# Patient Record
Sex: FEMALE | Race: WHITE | NOT HISPANIC OR LATINO | Employment: FULL TIME | RURAL
[De-identification: names, ages, dates, MRNs, and addresses within clinical notes are randomized per-mention and may not be internally consistent; named-entity substitution may affect disease eponyms.]

---

## 2021-12-29 DIAGNOSIS — Z34.90 PREGNANCY WITH FETUS OF UNKNOWN GESTATIONAL AGE: Primary | ICD-10-CM

## 2022-01-19 ENCOUNTER — INITIAL PRENATAL (OUTPATIENT)
Dept: OBSTETRICS AND GYNECOLOGY | Facility: CLINIC | Age: 22
End: 2022-01-19
Payer: COMMERCIAL

## 2022-01-19 VITALS
DIASTOLIC BLOOD PRESSURE: 60 MMHG | SYSTOLIC BLOOD PRESSURE: 118 MMHG | BODY MASS INDEX: 21.17 KG/M2 | WEIGHT: 139.25 LBS | HEART RATE: 88 BPM

## 2022-01-19 DIAGNOSIS — Z3A.01 6 WEEKS GESTATION OF PREGNANCY: ICD-10-CM

## 2022-01-19 DIAGNOSIS — Z34.91 FIRST TRIMESTER PREGNANCY: ICD-10-CM

## 2022-01-19 DIAGNOSIS — Z34.01 ENCOUNTER FOR SUPERVISION OF NORMAL FIRST PREGNANCY IN FIRST TRIMESTER: Primary | ICD-10-CM

## 2022-01-19 DIAGNOSIS — Z12.4 SCREENING FOR MALIGNANT NEOPLASM OF THE CERVIX: ICD-10-CM

## 2022-01-19 DIAGNOSIS — Z36.89 ENCOUNTER TO ESTABLISH GESTATIONAL AGE USING ULTRASOUND: ICD-10-CM

## 2022-01-19 LAB
BILIRUB SERPL-MCNC: NORMAL MG/DL
BLOOD URINE, POC: NORMAL
COLOR, POC UA: NORMAL
GLUCOSE UR QL STRIP: NORMAL
KETONES UR QL STRIP: NORMAL
LEUKOCYTE ESTERASE URINE, POC: NORMAL
NITRITE, POC UA: NORMAL
PH, POC UA: 7.5
PROTEIN, POC: NORMAL
SPECIFIC GRAVITY, POC UA: 1.02
UROBILINOGEN, POC UA: 0.2

## 2022-01-19 PROCEDURE — G0481 PR DRUG TEST DEF 8-14 CLASSES: ICD-10-PCS | Mod: ,,, | Performed by: CLINICAL MEDICAL LABORATORY

## 2022-01-19 PROCEDURE — 99213 OFFICE O/P EST LOW 20 MIN: CPT | Mod: PBBFAC | Performed by: STUDENT IN AN ORGANIZED HEALTH CARE EDUCATION/TRAINING PROGRAM

## 2022-01-19 PROCEDURE — 87077 CULTURE AEROBIC IDENTIFY: CPT | Mod: ,,, | Performed by: CLINICAL MEDICAL LABORATORY

## 2022-01-19 PROCEDURE — 81003 URINALYSIS AUTO W/O SCOPE: CPT | Mod: PBBFAC | Performed by: STUDENT IN AN ORGANIZED HEALTH CARE EDUCATION/TRAINING PROGRAM

## 2022-01-19 PROCEDURE — 87086 URINE CULTURE/COLONY COUNT: CPT | Mod: ,,, | Performed by: CLINICAL MEDICAL LABORATORY

## 2022-01-19 PROCEDURE — 87086 CULTURE, URINE: ICD-10-PCS | Mod: ,,, | Performed by: CLINICAL MEDICAL LABORATORY

## 2022-01-19 PROCEDURE — 87591 CHLAMYDIA/GONORRHOEAE(GC), PCR: ICD-10-PCS | Mod: ,,, | Performed by: CLINICAL MEDICAL LABORATORY

## 2022-01-19 PROCEDURE — G0481 DRUG TEST DEF 8-14 CLASSES: HCPCS | Mod: ,,, | Performed by: CLINICAL MEDICAL LABORATORY

## 2022-01-19 PROCEDURE — 88142 CYTOPATH C/V THIN LAYER: CPT | Mod: GCY | Performed by: STUDENT IN AN ORGANIZED HEALTH CARE EDUCATION/TRAINING PROGRAM

## 2022-01-19 PROCEDURE — 99203 PR OFFICE/OUTPT VISIT, NEW, LEVL III, 30-44 MIN: ICD-10-PCS | Mod: S$PBB,,, | Performed by: STUDENT IN AN ORGANIZED HEALTH CARE EDUCATION/TRAINING PROGRAM

## 2022-01-19 PROCEDURE — 87077 CULTURE, URINE: ICD-10-PCS | Mod: ,,, | Performed by: CLINICAL MEDICAL LABORATORY

## 2022-01-19 PROCEDURE — 99203 OFFICE O/P NEW LOW 30 MIN: CPT | Mod: S$PBB,,, | Performed by: STUDENT IN AN ORGANIZED HEALTH CARE EDUCATION/TRAINING PROGRAM

## 2022-01-19 PROCEDURE — 87491 CHLAMYDIA/GONORRHOEAE(GC), PCR: ICD-10-PCS | Mod: ,,, | Performed by: CLINICAL MEDICAL LABORATORY

## 2022-01-19 PROCEDURE — 87591 N.GONORRHOEAE DNA AMP PROB: CPT | Mod: ,,, | Performed by: CLINICAL MEDICAL LABORATORY

## 2022-01-19 PROCEDURE — 87186 CULTURE, URINE: ICD-10-PCS | Mod: ,,, | Performed by: CLINICAL MEDICAL LABORATORY

## 2022-01-19 PROCEDURE — 87491 CHLMYD TRACH DNA AMP PROBE: CPT | Mod: ,,, | Performed by: CLINICAL MEDICAL LABORATORY

## 2022-01-19 PROCEDURE — 87186 SC STD MICRODIL/AGAR DIL: CPT | Mod: ,,, | Performed by: CLINICAL MEDICAL LABORATORY

## 2022-01-19 RX ORDER — TERCONAZOLE 8 MG/G
1 CREAM VAGINAL NIGHTLY
Qty: 20 G | Refills: 0 | Status: SHIPPED | OUTPATIENT
Start: 2022-01-19 | End: 2022-01-22

## 2022-01-19 NOTE — PROGRESS NOTES
New OB History and Physical    CC:   Chief Complaint   Patient presents with    Initial Prenatal Visit     Initi OB Visit, LMP 12/3/2021. Pt has been having some nausea & not able to eat.         Assessment/Plan:   Stephanie BULLOCK is a 21 y.o. at 6w5d who presents for new OB visit    Problem List Items Addressed This Visit        Obstetric    6 weeks gestation of pregnancy    Overview     Estimated Date of Delivery: 9/9/22 established by LMP, 6w6d US consistent    First Trimester  - Prenatal Battery: ordered  - UDS: ordered  - Pap Smear: collected    - GC/C: ordered  - Early 1Hr GCT: (Body mass index is 21.17 kg/m².)  - Prophylactic ASA: NA      Second Trimester  - Quad: Plan to offer at 15-19wks.  - Anatomy Ultrasound: Will schedule for 20wks    Third Trimester  - 28 wk labs:   - Rh Status: pending  - 1Hr GCT:   - Fetal Kick Count:  - GC/C:   - GBS:     Vaccines  - Influenza: discussed  - TDAP: Plan to give around 32wks  - COVID: discussed    Infant sleep (ABC's) and Crib (32 weeks):     Contraception:         Encounter for supervision of normal pregnancy in first trimester - Primary      Other Visit Diagnoses     Screening for malignant neoplasm of the cervix        Relevant Orders    ThinPrep Pap Test    First trimester pregnancy        Relevant Orders    HIV 1/2 Ag/Ab (4th Gen) (Completed)    Hepatitis B Surface Antigen (Completed)    Type & Screen (Completed)    Rubella Antibody, IgG (Completed)    POCT URINALYSIS W/O SCOPE (Completed)    Urine culture    CBC Auto Differential (Completed)    Chlamydia/GC, PCR    Hepatitis C Antibody (Completed)    OB Drug Screen Definitive, Urine    Basic Metabolic Panel (Completed)    Encounter to establish gestational age using ultrasound        Relevant Orders    US OB <14 Wks TransAbd & TransVag, Single Gestation (XPD)          HPI: Stephanie BULLOCK is a 21 y.o. at 6w5d who presents for new OB visit.  Doing well, no concerns. Occasional nausea, discussed conservative  measures.        Review of Systems: The following ROS was otherwise negative, except as noted in the HPI:  constitutional, HEENT, respiratory, cardiovascular, gastrointestinal, genitourinary, skin, musculoskeletal, neurological, psych    Gynecologic History: Denies hx of abnl pap smears.  No hx of STIs.    Obstetrical History:  OB History        1    Para   0    Term   0       0    AB   0    Living   0       SAB   0    IAB   0    Ectopic   0    Multiple   0    Live Births   0                 Past Medical History:   Past Medical History:   Diagnosis Date    Acne     Hypoglycemia        Medications:  Medication List with Changes/Refills   New Medications    TERCONAZOLE (TERAZOL 3) 0.8 % VAGINAL CREAM    Place 1 applicator vaginally every evening. for 3 days   Current Medications    VITAMIN E 400 UNIT CAPSULE    Take 1 capsule (400 Units total) by mouth 2 (two) times daily.         Allergies:  Patient has no known allergies.    Surgical History:  History reviewed. No pertinent surgical history.    Family History:  Family History   Problem Relation Age of Onset    No Known Problems Father     Heart disease Mother     Stroke Mother     No Known Problems Brother     No Known Problems Sister     No Known Problems Brother     Clotting disorder Sister     No Known Problems Sister        Social History:  Social History     Substance and Sexual Activity   Alcohol Use Never     Social History     Substance and Sexual Activity   Drug Use Not Currently     Social History     Tobacco Use   Smoking Status Never Smoker   Smokeless Tobacco Never Used       Physical Exam:  /60   Pulse 88   Wt 63.2 kg (139 lb 4 oz)   LMP 2021   BMI 21.17 kg/m²     General: Alert, well appearing, no acute distress  Head: Normocephalic, atraumatic  Lungs: unlabored respirations  Abdomen: Gravid, soft, nontender   Pelvic:   External: normal female genitalia, no masses or lesions   Vagina: moist, pink mucosa with  rugae, yeast like discharge present  Cervix: no masses or lesions  Rectovaginal: deferred  Extremities: No redness or tenderness  Skin: Well perfused, normal coloration and turgor, no lesions or rashes visualized  Neuro: Alert, oriented, normal speech, no focal deficits, moves extremities appropriately  Psych: Appropriate, normal affect, appears stated age  Osteopathic: No TART changes      Labs:  Lab Visit on 01/19/2022   Component Date Value    HIV 1/2 01/19/2022 Non-Reactive     Hepatitis B Surface Ag 01/19/2022 Non-Reactive     Group & Rh 01/19/2022 O POS     Indirect [place] 01/19/2022 NEG     Rubella Titer, Blood 01/19/2022 26.7     Hepatitis C Ab 01/19/2022 Non-Reactive     Sodium 01/19/2022 134*    Potassium 01/19/2022 4.3     Chloride 01/19/2022 103     CO2 01/19/2022 27     Anion Gap 01/19/2022 8     Glucose 01/19/2022 76     BUN 01/19/2022 8     Creatinine 01/19/2022 0.61     BUN/Creatinine Ratio 01/19/2022 13     Calcium 01/19/2022 9.2     eGFR 01/19/2022 132     WBC 01/19/2022 8.46     RBC 01/19/2022 4.79     Hemoglobin 01/19/2022 14.4     Hematocrit 01/19/2022 42.2     MCV 01/19/2022 88.1     MCH 01/19/2022 30.1     MCHC 01/19/2022 34.1     RDW 01/19/2022 11.8     Platelet Count 01/19/2022 205     MPV 01/19/2022 11.0     Neutrophils % 01/19/2022 72.2*    Lymphocytes % 01/19/2022 21.6*    Monocytes % 01/19/2022 5.3     Eosinophils % 01/19/2022 0.6*    Basophils % 01/19/2022 0.1     Immature Granulocytes % 01/19/2022 0.2     nRBC, Auto 01/19/2022 0.0     Neutrophils, Abs 01/19/2022 6.10     Lymphocytes, Absolute 01/19/2022 1.83     Monocytes, Absolute 01/19/2022 0.45     Eosinophils, Absolute 01/19/2022 0.05     Basophils, Absolute 01/19/2022 0.01     Immature Granulocytes, A* 01/19/2022 0.02     nRBC, Absolute 01/19/2022 0.00     Diff Type 01/19/2022 Auto    Initial Prenatal on 01/19/2022   Component Date Value    Color, UA 01/19/2022 Dark [place]y     Spec Grav  UA 01/19/2022 1.020     pH, UA 01/19/2022 7.5     WBC, UA 01/19/2022 neg     Nitrite, UA 01/19/2022 neg     Protein, UA 01/19/2022 neg     Glucose, UA 01/19/2022 neg     Ketones, UA 01/19/2022 neg     Bilirubin, UA 01/19/2022 neg     Urobilinogen, UA 01/19/2022 0.2     Blood, UA 01/19/2022 neg

## 2022-01-19 NOTE — PATIENT INSTRUCTIONS
Patient Education       Healthy Weight Gain During Pregnancy   About this topic   No matter what you weigh before getting pregnant, you must gain weight during pregnancy. Pregnancy is not the time to lose weight. Steady weight gain that you get from a balanced and healthy diet does good things for you and your baby. The weight you gain gives your growing baby food. The extra weight will be used later to make milk.  Weight gain should be slow and steady. If you were a normal weight before getting pregnant, your doctor expects you to gain a certain amount during each stage of your pregnancy.  · First trimester: 1 to 41/2 pounds (0.5 to 2.25 kg).  · Second and third trimester: 1 to 2 pounds (0.5 to 1 kg) each week.  You should eat a variety of foods with lots of:  · Carbohydrates to give you energy.  · Proteins. You need proteins for your baby to grow and for your changing body.  · Fats. Healthy fats give energy and help your baby grow.  · Fiber. Helps you digest your food.  Most of the foods in these groups have vitamins and minerals. Your doctor may also ask you to take a vitamin pill.  General   The right weight for you is based on your weight before you got pregnant. The weight gain is also based on how many babies you are carrying. Eating for two does not mean eating twice as much. Doctors and dietitians may look at your height and weight before getting pregnant to help decide how much weight you should gain. The following are some suggested weight gains when pregnant.  If you are having one baby:  · If you are overweight: 11 to 25 pounds (5 to 11 kg)  · If you are normal weight: 25 to 35 pounds (11 to 16 kg)  · If you are below normal weight: 28 to 40 pounds (12.5 to 18 kg)  If you are having twins:  · If you are overweight: 25 to 50 pounds (11 to 23 kg)  · If you are normal weight: 37 to 54 pounds (17 to 25 kg)  · If you are below normal weight: 50 to 62 pounds (23 to 28 kg)  If you are having triplets or more:  Talk to your doctor about healthy weight gain for you.  If you were a normal weight before pregnancy, you only need 300 to 400 extra calories each day in your second and third trimester for you and your growing baby. If you were below a normal weight before pregnancy, you will need to put on weight. If you were already overweight when you got pregnant, you will still need to gain weight for the baby.  Here is a sample of where the weight gain will go if you were at a normal weight before getting pregnant.  · 7 to 8 pounds (3 to 3.5 kg) = baby's weight when born  · 6 pounds (2.5 kg) = uterus, fluid, and placenta  · 2 pounds (1 kg) = mother's breast  · 4 pounds (2 kg) = mother's blood  · 11 pounds (5 kg) = mother's fat, water, and nutrition  What will the results be?   A healthy weight gain during pregnancy will help you and your baby. Healthy weight gain may prevent problems during pregnancy, labor, and when giving birth. Your baby will have a good source of foods needed to grow and develop.  What lifestyle changes are needed?   · Workout regularly but moderately. Exercise is good for toning muscles and may help lower muscle aches and pains.  · Stay away from secondhand smoke. Breathing in the fumes from smokers may be harmful to the baby and you.  What drugs may be needed?   The doctor may order a vitamin for you.  What changes to diet are needed?   · Drink plenty of fluids whenever you are thirsty.  · Avoid beer, wine, and mixed drinks (alcohol).  · Try to eat every 3 hours while awake.  · Eat a well-balanced diet. Make sure that each meal has the right amount of nutrients and minerals that you and your baby needs. You may ask your doctor about seeing a dietitian.  What foods are good to eat?   · Healthy carbohydrates:  ? Fresh fruits like oranges, apples, pear, peaches and watermelon  ? Vegetables like broccoli, spinach, carrots, tomatoes, cabbage, and other green leafy vegetables  · Fiber-rich food:  ? Whole  wheat bread  ? Oatmeal  ? Brown rice, whole grain pasta  ? Seeded rye, barley  ? Wheat berries  · Foods high in minerals like folic acid, iron, and calcium:  ? Green leafy vegetables, broccoli  ? Apples, oranges  ? Bread, cereals  ? Milk  ? Lean meats, deboned fish  ? Beans  ? Eggs  ? Nut butter  What foods should be limited or avoided?   · Foods that are not cooked like:  ? Raw meat  ? Lunch meat  ? Raw shellfish: Oysters, clams, and mussels  ? Raw eggs and homemade foods made of raw eggs like mayonnaise  · Foods that may carry illnesses like:  ? Unpasteurized milk  ? Soft cheeses: Brie, Camembert, Roquefort, Feta, Gorgonzola, queso gaston and queso fresco  ? Unwashed fruits and vegetables  · Foods that are high in sugar, salt, or other things that are not good for you or your baby, like:  ? Fish high in mercury: Shark, swordfish, tracey mackerel, tuna, and tilefish  ? Smoked seafood  ? Food with saturated fat: High-fat cuts of meat (beef, lamb, pork), chicken with skin, whole fat dairy like whole milk, butter, cheese, ice cream  ? Canned fruits with syrup ? have more sugar  ? Canned vegetables ? have more salt  ? Instant grains and refined carbs: Instant oatmeal or rice, cookies, soda pop  What problems could happen?   · Loss of baby  · Birth defects  · High blood pressure during pregnancy  · Blood clots could develop in the legs or lungs  · Baby is born early  · Baby is not ideal weight range  · You have diabetes during pregnancy and have to take insulin  · You have a large baby  · Baby may grow up to have weight problems and diabetes  When do I need to call the doctor?   · Vaginal bleeding  · Cramping  · Too much weight gain  · Too little weight gain  Helpful tips   When buying food, read the label. Reading the label will help you decide what foods are best.  Where can I learn more?   Centers for Disease Control & Prevention  https://www.cdc.gov/reproductivehealth/maternalinfanthealth/pregnancy-weight-gain.htm    [employer]  https://www.marchofdimes.org/pregnancy/weight-gain-during-pregnancy.aspx   Last Reviewed Date   2021-09-09  Consumer Information Use and Disclaimer   This information is not specific medical advice and does not replace information you receive from your health care provider. This is only a brief summary of general information. It does NOT include all information about conditions, illnesses, injuries, tests, procedures, treatments, therapies, discharge instructions or life-style choices that may apply to you. You must talk with your health care provider for complete information about your health and treatment options. This information should not be used to decide whether or not to accept your health care providers advice, instructions or recommendations. Only your health care provider has the knowledge and training to provide advice that is right for you.  Copyright   Copyright © 2021 [employer] and its affiliates and/or licensors. All rights reserved.  Patient Education       How to Adapt to Physical Changes During Pregnancy   About this topic   When you get pregnant, your body goes through many physical changes. Sometimes, these changes can make you feel uncomfortable. Each pregnancy is different. Learning how these changes may affect you is important. Then, you can be ready to deal with them.  General   Every part of you may feel like it is changing when you are having a baby. Your body changes for whatever your baby needs to grow. Remember, most of these changes are just for a short time.  General Feelings   · You may be more tired than you were before. Try taking naps or sleeping a little longer at night. Your body is working hard at being pregnant.  · You may have mood swings. Talk to your partner and family to help you understand your situation.  · If you feel stressed, talk to someone. Ask your doctor how to cope with stress. Let others know how they can help you.  Skin and Body Shape    · As you gain weight, you may see stretch marks on your belly, thighs, hips, buttocks, or arms. Stretch marks are normal. The stretch marks will fade with time but will not go away.  · Your hips and breasts may grow bigger than normal. Exercise often to keep a healthy body and weight. Ask your doctor what exercise is best for you.  · Your body posture may change. Avoid lifting heavy objects. Do not bend at your waist to pick things up. Use your legs. Wear flat shoes with good cushion and support.  · Your hormones may cause oily skin. You may notice that pimples start to grow on your face or body. Wash your face often. Ask your doctor about any changes with your skin. Drink lots of fluids.  · Rashes may happen during pregnancy. Talk to your doctor about any rashes you notice.  · Your skin may itch more due to changes in hormones. Use gentle soaps and lotions. Avoid hot showers.  · Your vagina may become thicker, less sensitive, and make whitish discharge. Tell your doctor about any changes with your vagina. If you have vaginal bleeding or spotting, call your doctor right away.  Legs and Feet   · Your legs and feet may become swollen. When you sit, keep your feet and legs raised.  · Try not sit or stand for long periods of time. Change positions and take a short walk. Changing positions can help avoid pressure on your legs and blood clots in your legs  · Wear support panty hose all day to lower swelling. Tight pants and socks may limit blood flow and make your legs or feet more swollen.  Breast Changes   · Your breasts may grow bigger than normal. Wear a supportive bra. A supportive bra can also help keep nipples from leaking.  · Wash your breasts with plain water. Do not use harsh soap or rubbing alcohol. Harsh soap or rubbing alcohol will make your skin dry.  Belly and Bladder Changes   · You may feel sick to your stomach (nauseous) during your first few months of pregnancy. To help control an upset stomach, make  some changes with what you eat or how often you eat. Five or six small meals a day instead of three large meals may help your stomach. Avoid triggers that may worsen your feeling.  · Drink warm milk or teas without caffeine. Avoid drinking coffee, black tea, and cola drinks.  · Keep crackers by your bed so you have something to eat before you get up.  · Raise the head of your bed or use more pillows. Raising your head and shoulders may help avoid heartburn.  · Lie on your side, most often on your left side, to help avoid putting pressure on your womb. Lying on your side can also help ease pressure on your veins and improve blood flow. Try putting a pillow between your knees and one behind your back. The extra support may make you more comfortable.  · Do not bend or lie down right after you eat. Wait 2 to 3 hours before you lie down to rest.  · You may pass urine more often than usual, especially at night. Drink less fluid before bedtime.  · Avoid spicy or acidic food. Eat slowly. Eat small meals more often.  Mouth and Teeth Changes   · Your gums may bleed more easily. Get teeth and gums checked during pregnancy by your dentist.  · Follow proper mouth care. Check with your doctor regularly.  Breathing Changes   · Your body has more blood during pregnancy. This can lead to congestion or runny nose. Try using a humidifier or saline spray to clear out the mucus. Drink plenty of fluids.  · As your belly grows, your womb can cause pressure on your lungs making it harder to breathe. Take time to relax and find a comfortable position that makes breathing easier.  Body Pain   · Changes in your body may strain your muscles and cause back pain. Ask your doctor for specific things you can do to help avoid back pain.  · Good rest and sleep will make you feel better. Ask your partner or someone to massage your shoulders, neck, or back.  Hemorrhoids and Hard Stools   · Eat foods high in fiber like fruits and vegetables. Drink lots  of fluids. Exercise regularly. Proper exercise and diet can help reduce hard stools and hemorrhoids.  · Avoid straining. Use a stool softener. Be sure to ask your doctor before you take any drugs.     Where can I learn more?   FamilyDoctor.org  http://familydoctor.org/familydoctor/en/pregnancy-newborns/your-body/changes-in-your-body-during-pregnancy-first-trimester.html   [place] Department of Health & Human Services, Office on Womens Health  https://www.womenshealth.gov/pregnancy/youre-pregnant-now-what/body-changes-and-discomforts   Last Reviewed Date   2019-11-26  Consumer Information Use and Disclaimer   This information is not specific medical advice and does not replace information you receive from your health care provider. This is only a brief summary of general information. It does NOT include all information about conditions, illnesses, injuries, tests, procedures, treatments, therapies, discharge instructions or life-style choices that may apply to you. You must talk with your health care provider for complete information about your health and treatment options. This information should not be used to decide whether or not to accept your health care providers advice, instructions or recommendations. Only your health care provider has the knowledge and training to provide advice that is right for you.  Copyright   Copyright © 2021 Up[employer]Date, Inc. and its affiliates and/or licensors. All rights reserved.  Patient Education       Nutrition Before and During Pregnancy   The Basics   Written by the doctors and editors at [employer]   Will I need to change the way I eat when I am pregnant? -- Probably. In fact, you will probably need to change the way you eat before you get pregnant. You will also need to start taking a multivitamin that has folic acid in it.  If you want to get pregnant, see your doctor or nurse before you start trying. They will explain how your diet needs to change and outline the steps you can  take to have the healthiest pregnancy possible.  Eating the right foods will help your baby's development. Your baby will need nutrients from these foods to form normally and grow.  Eating the wrong foods could harm your baby. For example, if you eat cheese made from unpasteurized milk or raw or undercooked meat, you could get an infection that could lead to a miscarriage. A miscarriage is when a pregnancy ends on its own before the baby can live outside the womb. Likewise, if you take too much vitamin A (more than 3,000 micrograms or 10,000 international units a day) in a vitamin supplement, your baby could be born with birth defects.   Making healthy food choices is important for your health, too. As your baby grows and changes inside you, it will take nutrients from your body. You will have to replace these nutrients to stay healthy and have all the energy you need.  Which foods should I eat? -- The best diet for you and your baby will include lots of fresh fruits, vegetables, and whole grains, some low-fat dairy products, and a few sources of protein, such as meat, fish, eggs, or dried peas or beans. If you do not eat dairy foods, you will need to get calcium from other sources.  You can eat types of fish and seafood that are very low in mercury. In fact, eating these kinds of fish is good for your baby's development, as long as you don't eat them too often. Each week, experts suggest eating:  · 2 to 3 servings of fish very low in mercury - These include shrimp, canned light tuna, salmon, pollock, and catfish.  or   · 1 serving of fish low in mercury - These include bluefish, grouper, halibut, omrro morro, and yellowfin tuna. Tuna steaks are also OK to eat, but only 1 time a week.  You should avoid fish that are high in mercury (see below).  If you are a vegetarian, speak to a nutritionist (food expert) about your food choices. Vegetarian diets can sometimes be missing nutrients that are important for a growing  "baby.  Should I prepare food differently? -- Maybe. It's important to be extra careful about avoiding germs in your food. Getting an infection while you are pregnant can cause serious problems.   Here's what you should do to avoid germs in your food:  · Wash your hands well with soap and water before you handle food.  · Make sure to fully cook fish, chicken, beef, eggs, and other meats.  · Rinse fresh fruits and vegetables under lots of running water before you eat them.  · When you are done preparing food, wash your hands and anything that touched raw meat or fish with hot soapy water. This includes countertops, cutting boards, and knives and spoons.  To reduce the risk of germs in food, you should also avoid certain foods that can easily carry germs. These include:  · Raw sprouts (including alfalfa, clover, radish, and mung bean)  · Milk, cheese, or juice that has not been pasteurized (also called "unpasteurized")  Which foods should I avoid? -- You should avoid certain types of fish and all forms of alcohol. You should also limit the amount of caffeine in your diet, and check with your doctor before taking herbal products.  · High mercury fish - You should not eat types of fish that could have a lot of mercury in them. These include shark, swordfish, tracey mackerel, marlin, and tilefish (from the [place]). Mercury is a metal that can keep the baby's brain from developing normally.  Check with your doctor or nurse about the safety of other types of seafood, including fish caught in local rivers and lakes. The US Food and Drug Administration (FDA) also has more information about specific types of fish on their website (www.fda.gov/food/consumers/advice-about-eating-fish).  · Alcohol - You should avoid alcohol completely. Even small amounts of alcohol could harm a baby.  · Caffeine - Limit the amount of caffeine in your diet by not drinking more than 1 or 2 cups of coffee each day. Tea and cola also have " "caffeine, but not as much as coffee.  · Sugary drinks - Avoid or limit drinks with lots of sugar, such as soda and sports drinks. These are not good for your health at any time.  · Herbal products - Check with your doctor or nurse before using herbal products. Some herbal teas might not be safe.  · Cannabis products - Edible cannabis products are legal in some places. But most doctors and nurses recommend avoiding them during pregnancy. As with smoking marijuana, eating products that contain cannabis could cause problems for a baby, either at birth or later in life. More research is needed to better understand how cannabis affects pregnancy.  What are prenatal vitamins? -- Prenatal vitamins are vitamin supplements that you take the month before and all through your pregnancy. These vitamins, which also contain minerals (iron, calcium), help make sure that your baby has all the building blocks they need to form healthy organs. Prenatal vitamins help lower the risk of birth defects and other problems.  What should I look for in prenatal vitamins? -- You can buy prenatal vitamins from a store or pharmacy. Choose a multivitamin that's labeled "prenatal" and that has at least 400 micrograms of folic acid. Folic acid is especially important in preventing certain birth defects. Show your doctor or nurse the vitamins you plan to take to make sure the doses are right for you and your baby. Too much of some vitamins can be harmful.  Your doctor can also prescribe a prenatal vitamin for you. Prescription vitamins often have more of some vitamins and minerals than the ones found in stores. For example, your doctor might give you a prescription if they think you need extra iron. It's important to get enough iron while you're pregnant. This can help prevent a condition called "iron deficiency anemia."  How much weight should I gain? -- This depends on how much you weigh to begin with. Your doctor or nurse will tell you how much " "weight gain is right for you. In general, a person who is a healthy weight should gain 25 to 35 pounds during pregnancy. A person who is overweight or obese should gain less weight.  If you start to lose weight, for example, because you have severe morning sickness, call your doctor or nurse.  What if I can't afford to eat well? -- If you can't afford healthy food, ask your doctor or nurse for information about programs that can help you. In the [place], there is a government program called "WIC" that helps pregnant people and their families get the nutrition they need. Many [facility] and [facility] also have local programs to help those who are pregnant or nursing.  All topics are updated as new evidence becomes available and our peer review process is complete.  This topic retrieved from [employer] on: Sep 21, 2021.  Topic 35369 Version 15.0  Release: 29.4.2 - C29.263  © 2021 UpToDate, Inc. and/or its affiliates. All rights reserved.  Consumer Information Use and Disclaimer   This information is not specific medical advice and does not replace information you receive from your health care provider. This is only a brief summary of general information. It does NOT include all information about conditions, illnesses, injuries, tests, procedures, treatments, therapies, discharge instructions or life-style choices that may apply to you. You must talk with your health care provider for complete information about your health and treatment options. This information should not be used to decide whether or not to accept your health care provider's advice, instructions or recommendations. Only your health care provider has the knowledge and training to provide advice that is right for you. The use of this information is governed by the [employer] End User License Agreement, available at https://www.[employer] [facility].[employer]. [employer]/en/solutions/Branch/about/peter.The use of [employer] content is governed by the [employer] Terms of Use. ©2021 " [employer] All rights reserved.  Copyright   © 2021 [employer] and/or its affiliates. All rights reserved.

## 2022-01-20 LAB
CHLAMYDIA BY PCR: NEGATIVE
GH SERPL-MCNC: NORMAL NG/ML
INSULIN SERPL-ACNC: NORMAL U[IU]/ML
LAB AP CLINICAL INFORMATION: NORMAL
LAB AP GYN INTERPRETATION: NEGATIVE
LAB AP PAP DISCLAIMER COMMENTS: NORMAL
N. GONORRHOEAE (GC) BY PCR: NEGATIVE
RENIN PLAS-CCNC: NORMAL NG/ML/H

## 2022-01-21 LAB
7-AMINOCLONAZEPAM, URINE (RUSH): NEGATIVE 25 NG/ML
A-HYDROXYALPRAZOLAM, URINE (RUSH): NEGATIVE 25 NG/ML
AMITRIPTYLINE, URINE (RUSH): NEGATIVE 25 NG/ML
BENZOYLECGONINE, URINE (RUSH): NEGATIVE 100 NG/ML
BUTALBITAL, URINE (RUSH): NEGATIVE 50 NG/ML
CARISOPRODOL, URINE (RUSH): NEGATIVE 100 NG/ML
CODEINE, URINE (RUSH): NEGATIVE 25 NG/ML
CREAT UR-MCNC: 317 MG/DL (ref 28–219)
EDDP, URINE (RUSH): NEGATIVE 25 NG/ML
HYDROCODONE, URINE (RUSH): NEGATIVE 25 NG/ML
HYDROMORPHONE, URINE (RUSH): NEGATIVE 25 NG/ML
LORAZEPAM, URINE (RUSH): NEGATIVE 25 NG/ML
MEPROBAMATE, URINE (RUSH): NEGATIVE 100 NG/ML
METHADONE UR QL SCN: NEGATIVE 25 NG/ML
METHAMPHET UR QL SCN: NEGATIVE 100 NG/ML
MORPHINE, URINE (RUSH): NEGATIVE 25 NG/ML
NORDIAZEPAM, URINE (RUSH): NEGATIVE 25 NG/ML
NORHYDROCODONE, URINE (RUSH): NEGATIVE 50 NG/ML
NOROXYCODONE HCL, URINE (RUSH): NEGATIVE 50 NG/ML
OXAZEPAM, URINE (RUSH): NEGATIVE 25 NG/ML
OXYCODONE UR QL SCN: NEGATIVE 25 NG/ML
OXYMORPHONE, URINE (RUSH): NEGATIVE 25 NG/ML
PH UR STRIP: 7.5 PH UNITS
PHENOBARBITAL, URINE (RUSH): NEGATIVE 50 NG/ML
SECOBARBITAL, URINE (RUSH): NEGATIVE 50 NG/ML
SP GR UR STRIP: 1.02
TEMAZEPAM, URINE (RUSH): NEGATIVE 25 NG/ML

## 2022-01-22 LAB — UA COMPLETE W REFLEX CULTURE PNL UR: ABNORMAL

## 2022-01-24 ENCOUNTER — PATIENT MESSAGE (OUTPATIENT)
Dept: OBSTETRICS AND GYNECOLOGY | Facility: CLINIC | Age: 22
End: 2022-01-24
Payer: COMMERCIAL

## 2022-01-24 RX ORDER — ONDANSETRON 4 MG/1
4 TABLET, FILM COATED ORAL EVERY 6 HOURS PRN
Qty: 30 TABLET | Refills: 3 | Status: ON HOLD | OUTPATIENT
Start: 2022-01-24 | End: 2022-06-14 | Stop reason: HOSPADM

## 2022-02-01 ENCOUNTER — PATIENT MESSAGE (OUTPATIENT)
Dept: OBSTETRICS AND GYNECOLOGY | Facility: CLINIC | Age: 22
End: 2022-02-01
Payer: COMMERCIAL

## 2022-02-10 DIAGNOSIS — Z36.9 ANTENATAL SCREENING ENCOUNTER: Primary | ICD-10-CM

## 2022-03-02 ENCOUNTER — ROUTINE PRENATAL (OUTPATIENT)
Dept: OBSTETRICS AND GYNECOLOGY | Facility: CLINIC | Age: 22
End: 2022-03-02
Payer: COMMERCIAL

## 2022-03-02 VITALS
BODY MASS INDEX: 21.23 KG/M2 | SYSTOLIC BLOOD PRESSURE: 100 MMHG | DIASTOLIC BLOOD PRESSURE: 60 MMHG | WEIGHT: 139.63 LBS

## 2022-03-02 DIAGNOSIS — Z3A.12 12 WEEKS GESTATION OF PREGNANCY: ICD-10-CM

## 2022-03-02 DIAGNOSIS — Z34.01 ENCOUNTER FOR SUPERVISION OF NORMAL FIRST PREGNANCY IN FIRST TRIMESTER: Primary | ICD-10-CM

## 2022-03-02 LAB
BILIRUB SERPL-MCNC: NORMAL MG/DL
BLOOD URINE, POC: NORMAL
COLOR, POC UA: NORMAL
GLUCOSE UR QL STRIP: NEGATIVE
KETONES UR QL STRIP: NORMAL
LEUKOCYTE ESTERASE URINE, POC: NORMAL
NITRITE, POC UA: NEGATIVE
PH, POC UA: 5
PROTEIN, POC: NEGATIVE
SPECIFIC GRAVITY, POC UA: 1.03
UROBILINOGEN, POC UA: 0.2

## 2022-03-02 PROCEDURE — 0502F PR SUBSEQUENT PRENATAL CARE: ICD-10-PCS | Mod: CPTII,,, | Performed by: STUDENT IN AN ORGANIZED HEALTH CARE EDUCATION/TRAINING PROGRAM

## 2022-03-02 PROCEDURE — 87086 CULTURE, URINE: ICD-10-PCS | Mod: ,,, | Performed by: CLINICAL MEDICAL LABORATORY

## 2022-03-02 PROCEDURE — 81003 URINALYSIS AUTO W/O SCOPE: CPT | Mod: PBBFAC | Performed by: STUDENT IN AN ORGANIZED HEALTH CARE EDUCATION/TRAINING PROGRAM

## 2022-03-02 PROCEDURE — 87086 URINE CULTURE/COLONY COUNT: CPT | Mod: ,,, | Performed by: CLINICAL MEDICAL LABORATORY

## 2022-03-02 PROCEDURE — 0502F SUBSEQUENT PRENATAL CARE: CPT | Mod: CPTII,,, | Performed by: STUDENT IN AN ORGANIZED HEALTH CARE EDUCATION/TRAINING PROGRAM

## 2022-03-02 PROCEDURE — 99213 OFFICE O/P EST LOW 20 MIN: CPT | Mod: PBBFAC | Performed by: STUDENT IN AN ORGANIZED HEALTH CARE EDUCATION/TRAINING PROGRAM

## 2022-03-02 NOTE — PROGRESS NOTES
Return OB Office Visit    CC:   Chief Complaint   Patient presents with    Routine Prenatal Visit       Assessment/Plan:  21 y.o.  at 12w5d (Estimated Date of Delivery: 22) presents for [facility] appointment:    Problem List Items Addressed This Visit        Obstetric    12 weeks gestation of pregnancy    Overview     Estimated Date of Delivery: 22 established by LMP, 6w6d US consistent    First Trimester  - Prenatal Battery: WNL  - UDS: neg  - Pap Smear: NILM  - GC/C: neg    Second Trimester  - Quad: Plan to offer at 15-19wks.  - Anatomy Ultrasound: Will schedule for 20wks    Third Trimester  - 28 wk labs:   - Rh Status: O pos  - 1Hr GCT:   - Fetal Kick Count:  - GC/C:   - GBS:     Vaccines  - Influenza: discussed  - TDAP: Plan to give around 32wks  - COVID: discussed    Infant sleep (ABC's) and Crib (32 weeks):     Contraception:           Relevant Orders    POCT URINALYSIS W/O SCOPE (Completed)    Urine culture    Encounter for supervision of normal pregnancy in first trimester - Primary    Overview     NIPT negative                 HPI:  Pt seen and examined.  No concerns/complaints.  Denies VB, LOF, ctx.      Objective:  /60   Wt 63.3 kg (139 lb 9.6 oz)   LMP 2021   BMI 21.23 kg/m²   Gen: AO, NAD  Abd: Soft, NT, gravid measuring below umbilicus  Ext: Bilateral trace LE edema.  No calf tenderness. No erythema.  OMM: Increased lumbar lordosis

## 2022-03-03 ENCOUNTER — PATIENT MESSAGE (OUTPATIENT)
Dept: OBSTETRICS AND GYNECOLOGY | Facility: CLINIC | Age: 22
End: 2022-03-03
Payer: COMMERCIAL

## 2022-03-04 LAB — UA COMPLETE W REFLEX CULTURE PNL UR: NO GROWTH

## 2022-03-07 ENCOUNTER — HOSPITAL ENCOUNTER (EMERGENCY)
Facility: HOSPITAL | Age: 22
Discharge: HOME | End: 2022-03-07
Attending: EMERGENCY MEDICINE
Payer: COMMERCIAL

## 2022-03-07 VITALS
RESPIRATION RATE: 18 BRPM | OXYGEN SATURATION: 97 % | BODY MASS INDEX: 20.61 KG/M2 | WEIGHT: 136 LBS | HEART RATE: 92 BPM | DIASTOLIC BLOOD PRESSURE: 58 MMHG | TEMPERATURE: 98 F | SYSTOLIC BLOOD PRESSURE: 93 MMHG | HEIGHT: 68 IN

## 2022-03-07 DIAGNOSIS — R55 SYNCOPE, UNSPECIFIED SYNCOPE TYPE: Primary | ICD-10-CM

## 2022-03-07 DIAGNOSIS — Z3A.12 12 WEEKS GESTATION OF PREGNANCY: ICD-10-CM

## 2022-03-07 DIAGNOSIS — R55 SYNCOPE: ICD-10-CM

## 2022-03-07 DIAGNOSIS — Z34.91 ENCOUNTER FOR SUPERVISION OF NORMAL PREGNANCY IN FIRST TRIMESTER: ICD-10-CM

## 2022-03-07 LAB
ALBUMIN SERPL BCP-MCNC: 3.6 G/DL (ref 3.5–5)
ALBUMIN/GLOB SERPL: 1.1 {RATIO}
ALP SERPL-CCNC: 53 U/L (ref 52–144)
ALT SERPL W P-5'-P-CCNC: 19 U/L (ref 13–56)
ANION GAP SERPL CALCULATED.3IONS-SCNC: 13 MMOL/L (ref 7–16)
AST SERPL W P-5'-P-CCNC: 11 U/L (ref 15–37)
BASOPHILS # BLD AUTO: 0.02 K/UL (ref 0–0.2)
BASOPHILS NFR BLD AUTO: 0.2 % (ref 0–1)
BILIRUB SERPL-MCNC: 0.6 MG/DL (ref 0–1.2)
BILIRUB UR QL STRIP: NEGATIVE
BUN SERPL-MCNC: 8 MG/DL (ref 7–18)
BUN/CREAT SERPL: 13 (ref 6–20)
CALCIUM SERPL-MCNC: 8.7 MG/DL (ref 8.5–10.1)
CHLORIDE SERPL-SCNC: 103 MMOL/L (ref 98–107)
CLARITY UR: CLEAR
CO2 SERPL-SCNC: 24 MMOL/L (ref 21–32)
COLOR UR: YELLOW
CREAT SERPL-MCNC: 0.6 MG/DL (ref 0.55–1.02)
DIFFERENTIAL METHOD BLD: ABNORMAL
EOSINOPHIL # BLD AUTO: 0.03 K/UL (ref 0–0.5)
EOSINOPHIL NFR BLD AUTO: 0.2 % (ref 1–4)
ERYTHROCYTE [DISTWIDTH] IN BLOOD BY AUTOMATED COUNT: 12.6 % (ref 11.5–14.5)
GLOBULIN SER-MCNC: 3.2 G/DL (ref 2–4)
GLUCOSE SERPL-MCNC: 90 MG/DL (ref 74–106)
GLUCOSE UR STRIP-MCNC: NEGATIVE MG/DL
HCT VFR BLD AUTO: 34.8 % (ref 38–47)
HGB BLD-MCNC: 12.5 G/DL (ref 12–16)
IMM GRANULOCYTES # BLD AUTO: 0.05 K/UL (ref 0–0.04)
IMM GRANULOCYTES NFR BLD: 0.4 % (ref 0–0.4)
KETONES UR STRIP-SCNC: NEGATIVE MG/DL
LEUKOCYTE ESTERASE UR QL STRIP: NEGATIVE
LYMPHOCYTES # BLD AUTO: 1.14 K/UL (ref 1–4.8)
LYMPHOCYTES NFR BLD AUTO: 9.4 % (ref 27–41)
MAGNESIUM SERPL-MCNC: 1.7 MG/DL (ref 1.7–2.3)
MCH RBC QN AUTO: 30.1 PG (ref 27–31)
MCHC RBC AUTO-ENTMCNC: 35.9 G/DL (ref 32–36)
MCV RBC AUTO: 83.9 FL (ref 80–96)
MONOCYTES # BLD AUTO: 0.66 K/UL (ref 0–0.8)
MONOCYTES NFR BLD AUTO: 5.4 % (ref 2–6)
MPC BLD CALC-MCNC: 10.6 FL (ref 9.4–12.4)
NEUTROPHILS # BLD AUTO: 10.22 K/UL (ref 1.8–7.7)
NEUTROPHILS NFR BLD AUTO: 84.4 % (ref 53–65)
NITRITE UR QL STRIP: NEGATIVE
NRBC # BLD AUTO: 0 X10E3/UL
NRBC, AUTO (.00): 0 %
PH UR STRIP: 6 PH UNITS
PLATELET # BLD AUTO: 173 K/UL (ref 150–400)
POTASSIUM SERPL-SCNC: 3.9 MMOL/L (ref 3.5–5.1)
PROT SERPL-MCNC: 6.8 G/DL (ref 6.4–8.2)
PROT UR QL STRIP: ABNORMAL
RBC # BLD AUTO: 4.15 M/UL (ref 4.2–5.4)
RBC # UR STRIP: NEGATIVE /UL
SODIUM SERPL-SCNC: 136 MMOL/L (ref 136–145)
SP GR UR STRIP: >=1.03
UROBILINOGEN UR STRIP-ACNC: 0.2 MG/DL
WBC # BLD AUTO: 12.12 K/UL (ref 4.5–11)

## 2022-03-07 PROCEDURE — 36415 COLL VENOUS BLD VENIPUNCTURE: CPT | Performed by: EMERGENCY MEDICINE

## 2022-03-07 PROCEDURE — 83735 ASSAY OF MAGNESIUM: CPT | Performed by: EMERGENCY MEDICINE

## 2022-03-07 PROCEDURE — 81003 URINALYSIS AUTO W/O SCOPE: CPT | Performed by: EMERGENCY MEDICINE

## 2022-03-07 PROCEDURE — 99282 EMERGENCY DEPT VISIT SF MDM: CPT

## 2022-03-07 PROCEDURE — 99283 PR EMERGENCY DEPT VISIT,LEVEL III: ICD-10-PCS | Mod: ,,, | Performed by: EMERGENCY MEDICINE

## 2022-03-07 PROCEDURE — 99283 EMERGENCY DEPT VISIT LOW MDM: CPT | Mod: ,,, | Performed by: EMERGENCY MEDICINE

## 2022-03-07 PROCEDURE — 85025 COMPLETE CBC W/AUTO DIFF WBC: CPT | Performed by: EMERGENCY MEDICINE

## 2022-03-07 PROCEDURE — 80053 COMPREHEN METABOLIC PANEL: CPT | Performed by: EMERGENCY MEDICINE

## 2022-03-07 NOTE — ED TRIAGE NOTES
Pt in with cc of dizziness and vomiting since this morning she states she almost passed out this morning

## 2022-03-07 NOTE — ED PROVIDER NOTES
Encounter Date: 3/7/2022    SCRIBE #1 NOTE: I, Thi Benoit, am scribing for, and in the presence of,  Rd Pires MD. I have scribed the entire note.       History     Chief Complaint   Patient presents with    Dizziness    Vomiting     Patient is a 21 year old, 13 week pregnant, female who presents to the emergency department due to syncopal episode and vomiting. Patient explains that she has had frequent syncopal episodes throughout her life and has been tested for multiple causes. Patient explains that this morning she woke up and went outside where she again passed out. She reports being able to hear but had visual disturbance. As she woke up she explains that she immediately vomited. This is unlike any of her previous syncope episodes. No other symptoms were repored.     The history is provided by the patient. No  was used.     Review of patient's allergies indicates:  No Known Allergies  Past Medical History:   Diagnosis Date    Acne     Hypoglycemia      History reviewed. No pertinent surgical history.  Family History   Problem Relation Age of Onset    No Known Problems Father     Heart disease Mother     Stroke Mother     Protein S deficiency Mother     Clotting disorder Mother     No Known Problems Brother     No Known Problems Sister     No Known Problems Brother     Clotting disorder Sister     No Known Problems Sister      Social History     Tobacco Use    Smoking status: Never Smoker    Smokeless tobacco: Never Used   Substance Use Topics    Alcohol use: Never    Drug use: Not Currently     Review of Systems   Constitutional: Negative.    HENT: Negative.    Eyes: Negative.    Respiratory: Negative.    Cardiovascular: Negative.    Gastrointestinal: Positive for nausea and vomiting.   Endocrine: Negative.    Genitourinary: Negative.    Musculoskeletal: Negative.    Skin: Negative.    Allergic/Immunologic: Negative.    Neurological: Positive for syncope (multiple  occurances).   Hematological: Negative.    Psychiatric/Behavioral: Negative.    All other systems reviewed and are negative.      Physical Exam     Initial Vitals [03/07/22 1131]   BP Pulse Resp Temp SpO2   (!) 93/58 92 18 98.4 °F (36.9 °C) 97 %      MAP       --         Physical Exam    Nursing note and vitals reviewed.  Constitutional: She appears well-developed and well-nourished.   HENT:   Head: Normocephalic and atraumatic.   Eyes: Conjunctivae and EOM are normal. Pupils are equal, round, and reactive to light.   Neck: Neck supple.   Normal range of motion.  Cardiovascular: Normal rate, regular rhythm, normal heart sounds and intact distal pulses.   Pulmonary/Chest: Breath sounds normal.   Abdominal: Abdomen is soft. Bowel sounds are normal.   Musculoskeletal:         General: Normal range of motion.      Cervical back: Normal range of motion and neck supple.     Neurological: She is alert and oriented to person, place, and time. She has normal strength.   Skin: Skin is warm and dry. Capillary refill takes less than 2 seconds.   Psychiatric: She has a normal mood and affect. Thought content normal.         ED Course   Procedures  Labs Reviewed   COMPREHENSIVE METABOLIC PANEL - Abnormal; Notable for the following components:       Result Value    AST 11 (*)     All other components within normal limits   URINALYSIS, REFLEX TO URINE CULTURE - Abnormal; Notable for the following components:    Protein, UA Trace (*)     Specific Gravity, UA >=1.030 (*)     All other components within normal limits   CBC WITH DIFFERENTIAL - Abnormal; Notable for the following components:    WBC 12.12 (*)     RBC 4.15 (*)     Hematocrit 34.8 (*)     Neutrophils % 84.4 (*)     Lymphocytes % 9.4 (*)     Eosinophils % 0.2 (*)     Neutrophils, Abs 10.22 (*)     Immature Granulocytes, Absolute 0.05 (*)     All other components within normal limits   MAGNESIUM - Normal   CBC W/ AUTO DIFFERENTIAL    Narrative:     The following orders were  created for panel order CBC auto differential.  Procedure                               Abnormality         Status                     ---------                               -----------         ------                     CBC with Differential[[id number]]        Abnormal            Final result                 Please view results for these tests on the individual orders.          Imaging Results    None          Medications - No data to display             Attending Attestation:           Physician Attestation for Scribe:  Physician Attestation Statement for Scribe #1: I, RON HANNON, reviewed documentation, as scribed by ROJELIO MORALES in my presence, and it is both accurate and complete.                      Clinical Impression:   Final diagnoses:  [R55] Syncope, unspecified syncope type (Primary)          ED Disposition Condition    Discharge Stable        ED Prescriptions     None        Follow-up Information     Follow up With Specialties Details Why Contact Info    OB-GYN               Ron Hannon MD  03/07/22 0696

## 2022-03-23 ENCOUNTER — LAB VISIT (OUTPATIENT)
Dept: PRIMARY CARE CLINIC | Facility: CLINIC | Age: 22
End: 2022-03-23

## 2022-03-23 DIAGNOSIS — Z02.83 ENCOUNTER FOR EMPLOYMENT-RELATED DRUG TESTING: ICD-10-CM

## 2022-03-23 PROCEDURE — 99000 SPECIMEN HANDLING OFFICE-LAB: CPT | Mod: ,,, | Performed by: NURSE PRACTITIONER

## 2022-03-23 PROCEDURE — 99000 PR SPECIMEN HANDLING,DR OFF->LAB: ICD-10-PCS | Mod: ,,, | Performed by: NURSE PRACTITIONER

## 2022-03-24 NOTE — PROGRESS NOTES
Subjective:       Patient ID: Stephanie BULLOCK is a 21 y.o. female.    Chief Complaint: No chief complaint on file.    HPI  Review of Systems      Objective:      Physical Exam    Assessment:       Problem List Items Addressed This Visit    None     Visit Diagnoses     Encounter for employment-related drug testing              Plan:       Drug testing only

## 2022-03-30 ENCOUNTER — ROUTINE PRENATAL (OUTPATIENT)
Dept: OBSTETRICS AND GYNECOLOGY | Facility: CLINIC | Age: 22
End: 2022-03-30
Payer: COMMERCIAL

## 2022-03-30 VITALS
HEART RATE: 89 BPM | WEIGHT: 139.38 LBS | DIASTOLIC BLOOD PRESSURE: 58 MMHG | BODY MASS INDEX: 21.19 KG/M2 | SYSTOLIC BLOOD PRESSURE: 110 MMHG

## 2022-03-30 DIAGNOSIS — Z34.01 ENCOUNTER FOR SUPERVISION OF NORMAL FIRST PREGNANCY IN FIRST TRIMESTER: Primary | ICD-10-CM

## 2022-03-30 DIAGNOSIS — Z36.9 ENCOUNTER FOR FETAL ULTRASOUND: ICD-10-CM

## 2022-03-30 DIAGNOSIS — Z3A.16 16 WEEKS GESTATION OF PREGNANCY: ICD-10-CM

## 2022-03-30 LAB
BILIRUB SERPL-MCNC: ABNORMAL MG/DL
BLOOD URINE, POC: ABNORMAL
COLOR, POC UA: YELLOW
GLUCOSE UR QL STRIP: ABNORMAL
KETONES UR QL STRIP: ABNORMAL
LEUKOCYTE ESTERASE URINE, POC: ABNORMAL
NITRITE, POC UA: ABNORMAL
PH, POC UA: 7
PROTEIN, POC: ABNORMAL
SPECIFIC GRAVITY, POC UA: 1.01
UROBILINOGEN, POC UA: 0.2

## 2022-03-30 PROCEDURE — 0502F PR SUBSEQUENT PRENATAL CARE: ICD-10-PCS | Mod: CPTII,,, | Performed by: STUDENT IN AN ORGANIZED HEALTH CARE EDUCATION/TRAINING PROGRAM

## 2022-03-30 PROCEDURE — 81003 URINALYSIS AUTO W/O SCOPE: CPT | Mod: PBBFAC | Performed by: STUDENT IN AN ORGANIZED HEALTH CARE EDUCATION/TRAINING PROGRAM

## 2022-03-30 PROCEDURE — 99213 OFFICE O/P EST LOW 20 MIN: CPT | Mod: PBBFAC | Performed by: STUDENT IN AN ORGANIZED HEALTH CARE EDUCATION/TRAINING PROGRAM

## 2022-03-30 PROCEDURE — 0502F SUBSEQUENT PRENATAL CARE: CPT | Mod: CPTII,,, | Performed by: STUDENT IN AN ORGANIZED HEALTH CARE EDUCATION/TRAINING PROGRAM

## 2022-03-30 NOTE — PROGRESS NOTES
Return OB Office Visit    CC: No chief complaint on file.      Assessment/Plan:  21 y.o.  at 16w5d (Estimated Date of Delivery: 22) presents for JERI appointment:    Problem List Items Addressed This Visit        Obstetric    16 weeks gestation of pregnancy    Overview     Estimated Date of Delivery: 22 established by LMP, 6w6d US consistent    First Trimester  - Prenatal Battery: WNL  - UDS: neg  - Pap Smear: NILM  - GC/C: neg    Second Trimester  - Quad: Plan to offer at 15-19wks.  - Anatomy Ultrasound: Will schedule for 20wks    Third Trimester  - 28 wk labs:   - Rh Status: O pos  - 1Hr GCT:   - Fetal Kick Count:  - GC/C:   - GBS:     Vaccines  - Influenza: discussed  - TDAP: Plan to give around 32wks  - COVID: discussed    Infant sleep (ABC's) and Crib (32 weeks):     Contraception:           Relevant Orders    POCT URINALYSIS W/O SCOPE (Completed)    Encounter for supervision of normal pregnancy in first trimester - Primary    Overview     NIPT negative  US ordered           Relevant Orders    US OB 14+ Wks, TransAbd, Single Gestation      Other Visit Diagnoses     Encounter for fetal ultrasound        Relevant Orders    US OB 14+ Wks, TransAbd, Single Gestation          HPI:  Pt seen and examined.  No concerns/complaints.  Denies VB, LOF, ctx.  +FM    Objective:  BP (!) 110/58   Pulse 89   Wt 63.2 kg (139 lb 6 oz)   LMP 2021 (Exact Date)   BMI 21.19 kg/m²   Gen: AO, NAD  Abd: Soft, NT, gravid measuring below umbilicus  Ext: Bilateral trace LE edema.  No calf tenderness. No erythema.  OMM: Increased lumbar lordosis

## 2022-04-27 ENCOUNTER — HOSPITAL ENCOUNTER (OUTPATIENT)
Dept: RADIOLOGY | Facility: HOSPITAL | Age: 22
Discharge: HOME | End: 2022-04-27
Attending: STUDENT IN AN ORGANIZED HEALTH CARE EDUCATION/TRAINING PROGRAM
Payer: COMMERCIAL

## 2022-04-27 ENCOUNTER — ROUTINE PRENATAL (OUTPATIENT)
Dept: OBSTETRICS AND GYNECOLOGY | Facility: CLINIC | Age: 22
End: 2022-04-27
Payer: COMMERCIAL

## 2022-04-27 VITALS
BODY MASS INDEX: 22.75 KG/M2 | SYSTOLIC BLOOD PRESSURE: 110 MMHG | WEIGHT: 149.63 LBS | DIASTOLIC BLOOD PRESSURE: 60 MMHG

## 2022-04-27 DIAGNOSIS — Z3A.20 20 WEEKS GESTATION OF PREGNANCY: ICD-10-CM

## 2022-04-27 DIAGNOSIS — O26.892 PREGNANCY HEADACHE IN SECOND TRIMESTER: Primary | ICD-10-CM

## 2022-04-27 DIAGNOSIS — Z36.9 ENCOUNTER FOR FETAL ULTRASOUND: ICD-10-CM

## 2022-04-27 DIAGNOSIS — R51.9 PREGNANCY HEADACHE IN SECOND TRIMESTER: Primary | ICD-10-CM

## 2022-04-27 DIAGNOSIS — Z34.01 ENCOUNTER FOR SUPERVISION OF NORMAL FIRST PREGNANCY IN FIRST TRIMESTER: ICD-10-CM

## 2022-04-27 LAB
BILIRUB SERPL-MCNC: ABNORMAL MG/DL
BLOOD URINE, POC: ABNORMAL
COLOR, POC UA: YELLOW
GLUCOSE UR QL STRIP: ABNORMAL
KETONES UR QL STRIP: ABNORMAL
LEUKOCYTE ESTERASE URINE, POC: ABNORMAL
NITRITE, POC UA: ABNORMAL
PH, POC UA: 6.5
PROTEIN, POC: ABNORMAL
SPECIFIC GRAVITY, POC UA: 1.02
UROBILINOGEN, POC UA: 0.2

## 2022-04-27 PROCEDURE — 76805 US OB 14+ WEEKS, TRANSABDOM, SINGLE GESTATION: ICD-10-PCS | Mod: 26,,, | Performed by: RADIOLOGY

## 2022-04-27 PROCEDURE — 0502F PR SUBSEQUENT PRENATAL CARE: ICD-10-PCS | Mod: CPTII,,, | Performed by: STUDENT IN AN ORGANIZED HEALTH CARE EDUCATION/TRAINING PROGRAM

## 2022-04-27 PROCEDURE — 76805 OB US >/= 14 WKS SNGL FETUS: CPT | Mod: TC

## 2022-04-27 PROCEDURE — 0502F SUBSEQUENT PRENATAL CARE: CPT | Mod: CPTII,,, | Performed by: STUDENT IN AN ORGANIZED HEALTH CARE EDUCATION/TRAINING PROGRAM

## 2022-04-27 PROCEDURE — 99212 OFFICE O/P EST SF 10 MIN: CPT | Mod: PBBFAC,25 | Performed by: STUDENT IN AN ORGANIZED HEALTH CARE EDUCATION/TRAINING PROGRAM

## 2022-04-27 PROCEDURE — 76805 OB US >/= 14 WKS SNGL FETUS: CPT | Mod: 26,,, | Performed by: RADIOLOGY

## 2022-04-27 PROCEDURE — 87086 URINE CULTURE/COLONY COUNT: CPT | Mod: ,,, | Performed by: CLINICAL MEDICAL LABORATORY

## 2022-04-27 PROCEDURE — 81003 URINALYSIS AUTO W/O SCOPE: CPT | Mod: PBBFAC | Performed by: STUDENT IN AN ORGANIZED HEALTH CARE EDUCATION/TRAINING PROGRAM

## 2022-04-27 PROCEDURE — 87086 CULTURE, URINE: ICD-10-PCS | Mod: ,,, | Performed by: CLINICAL MEDICAL LABORATORY

## 2022-04-27 RX ORDER — BUTALBITAL, ACETAMINOPHEN AND CAFFEINE 50; 325; 40 MG/1; MG/1; MG/1
1 TABLET ORAL EVERY 4 HOURS PRN
Qty: 3 TABLET | Refills: 0 | Status: SHIPPED | OUTPATIENT
Start: 2022-04-27 | End: 2022-05-27

## 2022-04-29 PROBLEM — O26.892 PREGNANCY HEADACHE IN SECOND TRIMESTER: Status: ACTIVE | Noted: 2022-04-29

## 2022-04-29 PROBLEM — R51.9 PREGNANCY HEADACHE IN SECOND TRIMESTER: Status: ACTIVE | Noted: 2022-04-29

## 2022-04-29 LAB — UA COMPLETE W REFLEX CULTURE PNL UR: NO GROWTH

## 2022-04-30 NOTE — PROGRESS NOTES
Return OB Office Visit    CC:   Chief Complaint   Patient presents with    Routine Prenatal Visit       Assessment/Plan:  21 y.o.  at 21w0d (Estimated Date of Delivery: 22) presents for JERI appointment:    Problem List Items Addressed This Visit        Neuro    Pregnancy headache in second trimester    Overview     Reports a migraine a few days ago  Denies history of migraines  Rx for fioricet sent to pharmacy              Obstetric    20 weeks gestation of pregnancy - Primary    Overview     Estimated Date of Delivery: 22 established by LMP, 6w6d US consistent    First Trimester  - Prenatal Battery: WNL  - UDS: neg  - Pap Smear: NILM  - GC/C: neg    Second Trimester  - Anatomy Ultrasound: no abnormalities    Third Trimester  - 28 wk labs:   - Rh Status: O pos  - 1Hr GCT:   - Fetal Kick Count:  - GC/C:   - GBS:     Vaccines  - Influenza: discussed  - TDAP: Plan to give around 32wks  - COVID: discussed    Infant sleep (ABC's) and Crib (32 weeks):     Contraception:           Relevant Orders    POCT URINALYSIS W/O SCOPE (Completed)    Urine culture (Completed)          HPI:  Pt seen and examined.  No concerns/complaints.  Denies VB, LOF, ctx.  +FM    Objective:  /60   Wt 67.9 kg (149 lb 9.6 oz)   LMP 2021 (Exact Date)   BMI 22.75 kg/m²   Gen: AO, NAD  Abd: Soft, NT, gravid measuring 20w  Ext: Bilateral trace LE edema.  No calf tenderness. No erythema.  OMM: Increased lumbar lordosis

## 2022-05-25 ENCOUNTER — TELEPHONE (OUTPATIENT)
Dept: OBSTETRICS AND GYNECOLOGY | Facility: CLINIC | Age: 22
End: 2022-05-25
Payer: COMMERCIAL

## 2022-05-25 NOTE — TELEPHONE ENCOUNTER
----- Message from Agnes Ross sent at 2022 10:27 AM CDT -----  Please call Ms. Cisse.  Need to talk to nurse.         2000      Phone    [phone]            Thanks  Agnes

## 2022-06-07 ENCOUNTER — ROUTINE PRENATAL (OUTPATIENT)
Dept: OBSTETRICS AND GYNECOLOGY | Facility: CLINIC | Age: 22
End: 2022-06-07
Payer: COMMERCIAL

## 2022-06-07 VITALS
SYSTOLIC BLOOD PRESSURE: 110 MMHG | WEIGHT: 162.38 LBS | DIASTOLIC BLOOD PRESSURE: 68 MMHG | BODY MASS INDEX: 24.69 KG/M2

## 2022-06-07 DIAGNOSIS — R51.9 PREGNANCY HEADACHE IN SECOND TRIMESTER: Primary | ICD-10-CM

## 2022-06-07 DIAGNOSIS — Z3A.26 26 WEEKS GESTATION OF PREGNANCY: ICD-10-CM

## 2022-06-07 DIAGNOSIS — O26.892 PREGNANCY HEADACHE IN SECOND TRIMESTER: Primary | ICD-10-CM

## 2022-06-07 LAB
BILIRUB SERPL-MCNC: NEGATIVE MG/DL
BLOOD URINE, POC: NEGATIVE
COLOR, POC UA: NORMAL
GLUCOSE UR QL STRIP: NEGATIVE
KETONES UR QL STRIP: NEGATIVE
LEUKOCYTE ESTERASE URINE, POC: NEGATIVE
NITRITE, POC UA: NEGATIVE
PH, POC UA: NORMAL
PROTEIN, POC: NEGATIVE
SPECIFIC GRAVITY, POC UA: NORMAL
UROBILINOGEN, POC UA: NEGATIVE

## 2022-06-07 PROCEDURE — 99213 OFFICE O/P EST LOW 20 MIN: CPT | Mod: PBBFAC | Performed by: STUDENT IN AN ORGANIZED HEALTH CARE EDUCATION/TRAINING PROGRAM

## 2022-06-07 PROCEDURE — 81003 URINALYSIS AUTO W/O SCOPE: CPT | Mod: PBBFAC | Performed by: STUDENT IN AN ORGANIZED HEALTH CARE EDUCATION/TRAINING PROGRAM

## 2022-06-07 PROCEDURE — 0502F SUBSEQUENT PRENATAL CARE: CPT | Mod: CPTII,,, | Performed by: STUDENT IN AN ORGANIZED HEALTH CARE EDUCATION/TRAINING PROGRAM

## 2022-06-07 PROCEDURE — 0502F PR SUBSEQUENT PRENATAL CARE: ICD-10-PCS | Mod: CPTII,,, | Performed by: STUDENT IN AN ORGANIZED HEALTH CARE EDUCATION/TRAINING PROGRAM

## 2022-06-07 RX ORDER — BUTALBITAL, ACETAMINOPHEN AND CAFFEINE 50; 325; 40 MG/1; MG/1; MG/1
1 TABLET ORAL EVERY 4 HOURS PRN
COMMUNITY
End: 2022-06-28 | Stop reason: SDUPTHER

## 2022-06-07 RX ORDER — MULTIVIT WITH IRON,MINERALS
1 TABLET,CHEWABLE ORAL DAILY
COMMUNITY

## 2022-06-07 NOTE — PROGRESS NOTES
Return OB Office Visit    CC:   Chief Complaint   Patient presents with    Routine Prenatal Visit       Assessment/Plan:  21 y.o.  at 26w4d (Estimated Date of Delivery: 22) presents for JERI appointment:    Problem List Items Addressed This Visit        Neuro    Pregnancy headache in second trimester - Primary    Overview     Reports a migraine a few days ago  Denies history of migraines  Rx for fioricet sent to pharmacy  Improvement with fioricet              Obstetric    26 weeks gestation of pregnancy    Overview     Estimated Date of Delivery: 22 established by LMP, 6w6d US consistent    First Trimester  - Prenatal Battery: WNL  - UDS: neg  - Pap Smear: NILM  - GC/C: neg    Second Trimester  - Anatomy Ultrasound: no abnormalities    Third Trimester  - 28 wk labs:   - Rh Status: O pos  - 1Hr GCT:   - Fetal Kick Count:  - GC/C:   - GBS:     Vaccines  - Influenza: discussed  - TDAP: Plan to give around 32wks  - COVID: discussed      Contraception:           Relevant Orders    POCT URINALYSIS W/O SCOPE (Completed)    Glucose, 1Hr Post Prandial    CBC Auto Differential    Syphilis Antibody with reflex to RPR          HPI:  Pt seen and examined.  No concerns/complaints.  Denies VB, LOF, ctx.  +FM    Objective:  /68   Wt 73.7 kg (162 lb 6 oz)   LMP 2021 (Exact Date)   BMI 24.69 kg/m²   Gen: AO, NAD  Abd: Soft, NT, gravid measuring 25w  Ext: Bilateral trace LE edema.  No calf tenderness. No erythema.  OMM: Increased lumbar lordosis

## 2022-06-14 ENCOUNTER — HOSPITAL ENCOUNTER (OUTPATIENT)
Facility: HOSPITAL | Age: 22
Discharge: HOME | End: 2022-06-14
Attending: OBSTETRICS & GYNECOLOGY | Admitting: OBSTETRICS & GYNECOLOGY
Payer: COMMERCIAL

## 2022-06-14 ENCOUNTER — PATIENT MESSAGE (OUTPATIENT)
Dept: OBSTETRICS AND GYNECOLOGY | Facility: CLINIC | Age: 22
End: 2022-06-14
Payer: COMMERCIAL

## 2022-06-14 VITALS
DIASTOLIC BLOOD PRESSURE: 64 MMHG | SYSTOLIC BLOOD PRESSURE: 115 MMHG | RESPIRATION RATE: 20 BRPM | WEIGHT: 165.63 LBS | TEMPERATURE: 98 F | HEART RATE: 65 BPM | HEIGHT: 68 IN | BODY MASS INDEX: 25.1 KG/M2 | OXYGEN SATURATION: 98 %

## 2022-06-14 DIAGNOSIS — Z34.90 PREGNANCY: ICD-10-CM

## 2022-06-14 PROBLEM — Z3A.27 27 WEEKS GESTATION OF PREGNANCY: Status: ACTIVE | Noted: 2022-01-19

## 2022-06-14 PROBLEM — R10.2 PELVIC PAIN AFFECTING PREGNANCY IN SECOND TRIMESTER, ANTEPARTUM: Status: ACTIVE | Noted: 2022-04-29

## 2022-06-14 LAB
BILIRUB UR QL STRIP: NEGATIVE
CLARITY UR: CLEAR
COLOR UR: YELLOW
GLUCOSE UR STRIP-MCNC: NEGATIVE MG/DL
KETONES UR STRIP-SCNC: NEGATIVE MG/DL
LEUKOCYTE ESTERASE UR QL STRIP: NEGATIVE
NITRITE UR QL STRIP: NEGATIVE
PH UR STRIP: 7 PH UNITS
PROT UR QL STRIP: NEGATIVE
RBC # UR STRIP: NEGATIVE /UL
SP GR UR STRIP: 1.01
UROBILINOGEN UR STRIP-ACNC: 0.2 MG/DL

## 2022-06-14 PROCEDURE — 81003 URINALYSIS AUTO W/O SCOPE: CPT | Performed by: OBSTETRICS & GYNECOLOGY

## 2022-06-14 PROCEDURE — 99211 OFF/OP EST MAY X REQ PHY/QHP: CPT

## 2022-06-15 NOTE — PROGRESS NOTES
[facility] -  Labor and Delivery  Obstetrics  Antepartum Progress Note    Patient Name: Stephanie Cises  MRN: [id number]  Admission Date: 2022  Hospital Length of Stay: 0 days  Attending Physician: Mark Garzon MD  Primary Care Provider: Primary Doctor No    Subjective:     Principal Problem:Pelvic pain affecting pregnancy in second trimester, antepartum    HPI:    22  Pt of Dr Tavera    22yo  at 27w4d coming with sharp intermittent lateral pains, sometimes knife-like.  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        Hospital Course:    Fetal montoring  NST - REACTIVE      NON-STRESS TEST (NST) INTERPRETATION    Patient:  Stephanie Cisse    Date:  2022    Gestational Age:  27w4d    NST Indication(s):   Abdominal pain    FINDINGS:    Baseline heart rate: 140    NST Variability: Moderate  Category I    INTERPRETATION:   Reactive    Comments: Explained to patient that this visit can count for the NST that Dr Tavera wanted this week    Follow up: continue routine prenatal care      Mark Garzon MD       Obstetric HPI:  Patient reports None contractions, active fetal movement, absent vaginal bleeding , absent loss of fluid      Objective:     Vital Signs (Most Recent):  Temp: 98.2 °F (36.8 °C) (22 1620)  Pulse: 65 (22 1620)  Resp: 20 (22 1620)  BP: 115/64 (22 1620)  SpO2: 98 % (22 1620) Vital Signs (24h Range):  Temp:  [98.2 °F (36.8 °C)] 98.2 °F (36.8 °C)  Pulse:  [65] 65  Resp:  [20] 20  SpO2:  [98 %] 98 %  BP: (115)/(64) 115/64     Weight: 75.1 kg (165 lb 9.6 oz)  Body mass index is 25.18 kg/m².    FHT: 140s Cat 1 (reassuring)  TOCO:  Q no minutes    No intake or output data in the 24 hours ending 22         Significant Labs:  Recent  Lab Results         22  1641        Appearance, UA Clear       Bilirubin (UA) Negative       Color, UA Yellow       Glucose, UA Negative       Ketones, UA Negative       Leukocytes, UA Negative       NITRITE UA Negative       Occult Blood UA Negative       pH, UA 7.0       Protein, UA Negative       Specific Gravity, UA 1.010       UROBILINOGEN UA 0.2               Physical Exam:   Constitutional: She is oriented to person, place, and time. She appears well-developed and well-nourished.    HENT:   Head: Normocephalic and atraumatic.    Eyes: Pupils are equal, round, and reactive to light.     Cardiovascular:  Normal rate.      Exam reveals no edema.        Pulmonary/Chest: Effort normal. No respiratory distress.        Abdominal: Soft. She exhibits no distension and no mass. There is no abdominal tenderness. There is no rebound and no guarding.     Genitourinary:    Pelvic exam was performed with patient supine.   The external female genitalia was normal.   No external genitalia lesions identified,Genitalia hair distrobution normal .   No  no vaginal discharge in the vagina.           Musculoskeletal: Normal range of motion.       Neurological: She is alert and oriented to person, place, and time. She displays normal reflexes. She exhibits normal muscle tone.    Skin: Skin is warm and dry. No rash noted. No erythema.    Psychiatric: She has a normal mood and affect. Her behavior is normal.     Assessment/Plan:     21 y.o. 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  Recommended pregnancy belt / prenatal cradle    Obstetric precautions given    27 weeks gestation of pregnancy    No e/o PTL  Continue routine prenatal care          Mark Garzon MD  Obstetrics  [facility] [place] -  Labor and Delivery

## 2022-06-15 NOTE — HOSPITAL COURSE
Fetal montoring  NST - REACTIVE      NON-STRESS TEST (NST) INTERPRETATION    Patient:  Stephanie Cisse    Date:  6/14/2022    Gestational Age:  27w4d    NST Indication(s):   Abdominal pain    FINDINGS:    Baseline heart rate: 140    NST Variability: Moderate  Category I    INTERPRETATION:   Reactive    Comments: Explained to patient that this visit can count for the NST that Dr Tavera wanted this week    Follow up: continue routine prenatal care      Mark Garzon MD

## 2022-06-15 NOTE — SUBJECTIVE & OBJECTIVE
Obstetric HPI:  Patient reports None contractions, active fetal movement, absent vaginal bleeding , absent loss of fluid      Objective:     Vital Signs (Most Recent):  Temp: 98.2 °F (36.8 °C) (06/14/22 1620)  Pulse: 65 (06/14/22 1620)  Resp: 20 (06/14/22 1620)  BP: 115/64 (06/14/22 1620)  SpO2: 98 % (06/14/22 1620) Vital Signs (24h Range):  Temp:  [98.2 °F (36.8 °C)] 98.2 °F (36.8 °C)  Pulse:  [65] 65  Resp:  [20] 20  SpO2:  [98 %] 98 %  BP: (115)/(64) 115/64     Weight: 75.1 kg (165 lb 9.6 oz)  Body mass index is 25.18 kg/m².    FHT: 140s Cat 1 (reassuring)  TOCO:  Q no minutes    No intake or output data in the 24 hours ending 06/14/22 1958         Significant Labs:  Recent Lab Results         06/14/22  1641        Appearance, UA Clear       Bilirubin (UA) Negative       Color, UA Yellow       Glucose, UA Negative       Ketones, UA Negative       Leukocytes, UA Negative       NITRITE UA Negative       Occult Blood UA Negative       pH, UA 7.0       Protein, UA Negative       Specific Gravity, UA 1.010       UROBILINOGEN UA 0.2               Physical Exam:   Constitutional: She is oriented to person, place, and time. She appears well-developed and well-nourished.    HENT:   Head: Normocephalic and atraumatic.    Eyes: Pupils are equal, round, and reactive to light.     Cardiovascular:  Normal rate.      Exam reveals no edema.        Pulmonary/Chest: Effort normal. No respiratory distress.        Abdominal: Soft. She exhibits no distension and no mass. There is no abdominal tenderness. There is no rebound and no guarding.     Genitourinary:    Pelvic exam was performed with patient supine.   The external female genitalia was normal.   No external genitalia lesions identified,Genitalia hair distrobution normal .   No  no vaginal discharge in the vagina.           Musculoskeletal: Normal range of motion.       Neurological: She is alert and oriented to person, place, and time. She displays normal reflexes. She  exhibits normal muscle tone.    Skin: Skin is warm and dry. No rash noted. No erythema.    Psychiatric: She has a normal mood and affect. Her behavior is normal.

## 2022-06-15 NOTE — ASSESSMENT & PLAN NOTE
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  Recommended pregnancy belt / prenatal cradle    Obstetric precautions given

## 2022-06-15 NOTE — HPI
22  Pt of Dr Tavera    20yo  at 27w4d coming with sharp intermittent lateral pains, sometimes knife-like.  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

## 2022-06-28 ENCOUNTER — ROUTINE PRENATAL (OUTPATIENT)
Dept: OBSTETRICS AND GYNECOLOGY | Facility: CLINIC | Age: 22
End: 2022-06-28
Payer: COMMERCIAL

## 2022-06-28 VITALS
BODY MASS INDEX: 25.51 KG/M2 | SYSTOLIC BLOOD PRESSURE: 120 MMHG | WEIGHT: 167.81 LBS | DIASTOLIC BLOOD PRESSURE: 70 MMHG

## 2022-06-28 DIAGNOSIS — Z3A.29 29 WEEKS GESTATION OF PREGNANCY: ICD-10-CM

## 2022-06-28 DIAGNOSIS — O26.893 HEADACHE IN PREGNANCY, THIRD TRIMESTER: Primary | ICD-10-CM

## 2022-06-28 DIAGNOSIS — R51.9 HEADACHE IN PREGNANCY, THIRD TRIMESTER: Primary | ICD-10-CM

## 2022-06-28 LAB
BILIRUB SERPL-MCNC: ABNORMAL MG/DL
BLOOD URINE, POC: ABNORMAL
COLOR, POC UA: ABNORMAL
GLUCOSE UR QL STRIP: ABNORMAL
KETONES UR QL STRIP: ABNORMAL
LEUKOCYTE ESTERASE URINE, POC: ABNORMAL
NITRITE, POC UA: ABNORMAL
PH, POC UA: 7.5
PROTEIN, POC: ABNORMAL
SPECIFIC GRAVITY, POC UA: 1.01
UROBILINOGEN, POC UA: 0.2

## 2022-06-28 PROCEDURE — 0502F PR SUBSEQUENT PRENATAL CARE: ICD-10-PCS | Mod: CPTII,,, | Performed by: STUDENT IN AN ORGANIZED HEALTH CARE EDUCATION/TRAINING PROGRAM

## 2022-06-28 PROCEDURE — 81003 URINALYSIS AUTO W/O SCOPE: CPT | Mod: PBBFAC | Performed by: STUDENT IN AN ORGANIZED HEALTH CARE EDUCATION/TRAINING PROGRAM

## 2022-06-28 PROCEDURE — 0502F SUBSEQUENT PRENATAL CARE: CPT | Mod: CPTII,,, | Performed by: STUDENT IN AN ORGANIZED HEALTH CARE EDUCATION/TRAINING PROGRAM

## 2022-06-28 PROCEDURE — 99213 OFFICE O/P EST LOW 20 MIN: CPT | Mod: PBBFAC | Performed by: STUDENT IN AN ORGANIZED HEALTH CARE EDUCATION/TRAINING PROGRAM

## 2022-06-28 RX ORDER — BUTALBITAL, ACETAMINOPHEN AND CAFFEINE 50; 325; 40 MG/1; MG/1; MG/1
1 TABLET ORAL EVERY 4 HOURS PRN
Qty: 30 TABLET | Refills: 1 | Status: ON HOLD | OUTPATIENT
Start: 2022-06-28 | End: 2022-09-04 | Stop reason: HOSPADM

## 2022-06-28 NOTE — PROGRESS NOTES
Subjective:      Stephanie Cisse is a 21 y.o. female being seen today for her obstetrical visit. She is at 29w4d gestation. Patient reports no complaints. Fetal movement: normal.    Menstrual History:  OB History        1    Para   0    Term   0       0    AB   0    Living           SAB   0    IAB   0    Ectopic   0    Multiple        Live Births                      The following portions of the patient's history were reviewed and updated as appropriate: allergies, current medications, past family history, past medical history, past social history, past surgical history and problem list.    Review of Systems  Pertinent items are noted in HPI.     Objective:      /70   Wt 76.1 kg (167 lb 12.8 oz)   LMP 2021 (Exact Date)   BMI 25.51 kg/m²   FHT:  138 BPM   Uterine Size: 29 cm   Presentation: unsure        Assessment:      Pregnancy 29 and 4/7 weeks    Headaches    Plan:      28-week labs reviewed, normal  Refill of fioricet sent to pharmacy  Follow up in 2 Weeks.

## 2022-07-01 ENCOUNTER — PATIENT MESSAGE (OUTPATIENT)
Dept: OBSTETRICS AND GYNECOLOGY | Facility: CLINIC | Age: 22
End: 2022-07-01
Payer: COMMERCIAL

## 2022-07-01 PROBLEM — O26.893 HEADACHE IN PREGNANCY, THIRD TRIMESTER: Status: ACTIVE | Noted: 2022-07-01

## 2022-07-01 PROBLEM — R51.9 HEADACHE IN PREGNANCY, THIRD TRIMESTER: Status: ACTIVE | Noted: 2022-07-01

## 2022-07-01 PROBLEM — Z3A.29 29 WEEKS GESTATION OF PREGNANCY: Status: ACTIVE | Noted: 2022-01-19

## 2022-07-13 ENCOUNTER — ROUTINE PRENATAL (OUTPATIENT)
Dept: OBSTETRICS AND GYNECOLOGY | Facility: CLINIC | Age: 22
End: 2022-07-13
Payer: COMMERCIAL

## 2022-07-13 VITALS
SYSTOLIC BLOOD PRESSURE: 120 MMHG | WEIGHT: 168.31 LBS | BODY MASS INDEX: 25.59 KG/M2 | DIASTOLIC BLOOD PRESSURE: 78 MMHG

## 2022-07-13 DIAGNOSIS — Z3A.31 31 WEEKS GESTATION OF PREGNANCY: ICD-10-CM

## 2022-07-13 DIAGNOSIS — Z34.03 ENCOUNTER FOR SUPERVISION OF NORMAL FIRST PREGNANCY, THIRD TRIMESTER: Primary | ICD-10-CM

## 2022-07-13 LAB
BILIRUB SERPL-MCNC: NORMAL MG/DL
BLOOD URINE, POC: NORMAL
COLOR, POC UA: YELLOW
GLUCOSE UR QL STRIP: NORMAL
KETONES UR QL STRIP: NORMAL
LEUKOCYTE ESTERASE URINE, POC: NORMAL
NITRITE, POC UA: NORMAL
PH, POC UA: 7
PROTEIN, POC: NORMAL
SPECIFIC GRAVITY, POC UA: 1.02
UROBILINOGEN, POC UA: 1

## 2022-07-13 PROCEDURE — 99213 OFFICE O/P EST LOW 20 MIN: CPT | Mod: PBBFAC | Performed by: STUDENT IN AN ORGANIZED HEALTH CARE EDUCATION/TRAINING PROGRAM

## 2022-07-13 PROCEDURE — 0502F SUBSEQUENT PRENATAL CARE: CPT | Mod: CPTII,,, | Performed by: STUDENT IN AN ORGANIZED HEALTH CARE EDUCATION/TRAINING PROGRAM

## 2022-07-13 PROCEDURE — 81003 URINALYSIS AUTO W/O SCOPE: CPT | Mod: PBBFAC | Performed by: STUDENT IN AN ORGANIZED HEALTH CARE EDUCATION/TRAINING PROGRAM

## 2022-07-13 PROCEDURE — 0502F PR SUBSEQUENT PRENATAL CARE: ICD-10-PCS | Mod: CPTII,,, | Performed by: STUDENT IN AN ORGANIZED HEALTH CARE EDUCATION/TRAINING PROGRAM

## 2022-07-13 NOTE — PROGRESS NOTES
Subjective:      Stephanie Cisse is a 21 y.o. female being seen today for her obstetrical visit. She is at 31w5d gestation. Patient reports no complaints. Fetal movement: normal.    Menstrual History:  OB History        1    Para   0    Term   0       0    AB   0    Living           SAB   0    IAB   0    Ectopic   0    Multiple        Live Births                    The following portions of the patient's history were reviewed and updated as appropriate: allergies, current medications, past family history, past medical history, past social history, past surgical history and problem list.    Review of Systems  Pertinent items are noted in HPI.     Objective:      /78   Wt 76.3 kg (168 lb 4.8 oz)   LMP 2021 (Exact Date)   BMI 25.59 kg/m²   FHT:  150 BPM   Uterine Size: 31 cm            Assessment:      Pregnancy 31 and 5/7 weeks     Plan:     TDAP next visit  Follow up in 2 weeks.

## 2022-07-27 ENCOUNTER — ROUTINE PRENATAL (OUTPATIENT)
Dept: OBSTETRICS AND GYNECOLOGY | Facility: CLINIC | Age: 22
End: 2022-07-27
Payer: COMMERCIAL

## 2022-07-27 VITALS — SYSTOLIC BLOOD PRESSURE: 120 MMHG | WEIGHT: 179.5 LBS | DIASTOLIC BLOOD PRESSURE: 68 MMHG | BODY MASS INDEX: 27.29 KG/M2

## 2022-07-27 DIAGNOSIS — Z3A.33 33 WEEKS GESTATION OF PREGNANCY: ICD-10-CM

## 2022-07-27 DIAGNOSIS — Z34.03 ENCOUNTER FOR SUPERVISION OF NORMAL FIRST PREGNANCY, THIRD TRIMESTER: Primary | ICD-10-CM

## 2022-07-27 DIAGNOSIS — R82.998 LEUKOCYTES IN URINE: ICD-10-CM

## 2022-07-27 LAB
BILIRUB SERPL-MCNC: NEGATIVE MG/DL
BLOOD URINE, POC: NEGATIVE
COLOR, POC UA: NORMAL
GLUCOSE UR QL STRIP: NEGATIVE
KETONES UR QL STRIP: NEGATIVE
LEUKOCYTE ESTERASE URINE, POC: NORMAL
NITRITE, POC UA: NEGATIVE
PH, POC UA: 5
PROTEIN, POC: NEGATIVE
SPECIFIC GRAVITY, POC UA: 1.01
UROBILINOGEN, POC UA: 0.2

## 2022-07-27 PROCEDURE — 90715 TDAP VACCINE 7 YRS/> IM: CPT | Mod: PBBFAC | Performed by: STUDENT IN AN ORGANIZED HEALTH CARE EDUCATION/TRAINING PROGRAM

## 2022-07-27 PROCEDURE — 87086 URINE CULTURE/COLONY COUNT: CPT | Mod: ,,, | Performed by: CLINICAL MEDICAL LABORATORY

## 2022-07-27 PROCEDURE — 87186 CULTURE, URINE: ICD-10-PCS | Mod: ,,, | Performed by: CLINICAL MEDICAL LABORATORY

## 2022-07-27 PROCEDURE — 87186 SC STD MICRODIL/AGAR DIL: CPT | Mod: ,,, | Performed by: CLINICAL MEDICAL LABORATORY

## 2022-07-27 PROCEDURE — 87077 CULTURE AEROBIC IDENTIFY: CPT | Mod: ,,, | Performed by: CLINICAL MEDICAL LABORATORY

## 2022-07-27 PROCEDURE — 99213 OFFICE O/P EST LOW 20 MIN: CPT | Mod: PBBFAC | Performed by: STUDENT IN AN ORGANIZED HEALTH CARE EDUCATION/TRAINING PROGRAM

## 2022-07-27 PROCEDURE — 87086 CULTURE, URINE: ICD-10-PCS | Mod: ,,, | Performed by: CLINICAL MEDICAL LABORATORY

## 2022-07-27 PROCEDURE — 87077 CULTURE, URINE: ICD-10-PCS | Mod: ,,, | Performed by: CLINICAL MEDICAL LABORATORY

## 2022-07-27 PROCEDURE — 81003 URINALYSIS AUTO W/O SCOPE: CPT | Mod: PBBFAC | Performed by: STUDENT IN AN ORGANIZED HEALTH CARE EDUCATION/TRAINING PROGRAM

## 2022-07-27 PROCEDURE — 0502F PR SUBSEQUENT PRENATAL CARE: ICD-10-PCS | Mod: CPTII,,, | Performed by: STUDENT IN AN ORGANIZED HEALTH CARE EDUCATION/TRAINING PROGRAM

## 2022-07-27 PROCEDURE — 0502F SUBSEQUENT PRENATAL CARE: CPT | Mod: CPTII,,, | Performed by: STUDENT IN AN ORGANIZED HEALTH CARE EDUCATION/TRAINING PROGRAM

## 2022-07-27 NOTE — PROGRESS NOTES
Subjective:      Stephanie Cisse is a 21 y.o. female being seen today for her obstetrical visit. She is at 33w5d gestation. Patient reports no complaints. Fetal movement: normal.    Menstrual History:  OB History        1    Para   0    Term   0       0    AB   0    Living           SAB   0    IAB   0    Ectopic   0    Multiple        Live Births                   The following portions of the patient's history were reviewed and updated as appropriate: allergies, current medications, past family history, past medical history, past social history, past surgical history and problem list.    Review of Systems  Pertinent items are noted in HPI.     Objective:      /68   Wt 81.4 kg (179 lb 8 oz)   LMP 2021 (Exact Date)   BMI 27.29 kg/m²   FHT:  134 BPM   Uterine Size: 33 cm            Assessment:      Pregnancy 33 and 5/7 weeks     Plan:     TDAP today  Follow up in 2 Weeks.

## 2022-07-28 RX ORDER — NITROFURANTOIN 25; 75 MG/1; MG/1
100 CAPSULE ORAL 2 TIMES DAILY
Qty: 14 CAPSULE | Refills: 0 | Status: SHIPPED | OUTPATIENT
Start: 2022-07-28 | End: 2022-08-08 | Stop reason: SDUPTHER

## 2022-07-29 LAB — UA COMPLETE W REFLEX CULTURE PNL UR: ABNORMAL

## 2022-07-31 ENCOUNTER — HOSPITAL ENCOUNTER (OUTPATIENT)
Facility: HOSPITAL | Age: 22
Discharge: HOME | End: 2022-07-31
Attending: OBSTETRICS & GYNECOLOGY | Admitting: OBSTETRICS & GYNECOLOGY
Payer: COMMERCIAL

## 2022-07-31 PROBLEM — Z3A.34 34 WEEKS GESTATION OF PREGNANCY: Status: ACTIVE | Noted: 2022-01-19

## 2022-07-31 PROBLEM — O26.893: Status: ACTIVE | Noted: 2022-04-29

## 2022-07-31 LAB
BILIRUB UR QL STRIP: NEGATIVE
CLARITY UR: CLEAR
COLOR UR: ABNORMAL
GLUCOSE UR STRIP-MCNC: 70 MG/DL
KETONES UR STRIP-SCNC: ABNORMAL MG/DL
LEUKOCYTE ESTERASE UR QL STRIP: NEGATIVE
NITRITE UR QL STRIP: NEGATIVE
PH UR STRIP: 6.5 PH UNITS
PROT UR QL STRIP: 10
RBC # UR STRIP: NEGATIVE /UL
SP GR UR STRIP: 1.01
UROBILINOGEN UR STRIP-ACNC: NORMAL MG/DL

## 2022-07-31 PROCEDURE — 99211 OFF/OP EST MAY X REQ PHY/QHP: CPT

## 2022-07-31 PROCEDURE — 81003 URINALYSIS AUTO W/O SCOPE: CPT | Performed by: OBSTETRICS & GYNECOLOGY

## 2022-08-01 NOTE — PROGRESS NOTES
[facility] -  Labor and Delivery  Obstetrics  Antepartum Progress Note    Patient Name: Stephanie Cisse  MRN: [id number]  Admission Date: 2022  Hospital Length of Stay: 0 days  Attending Physician: No att. providers found  Primary Care Provider: Primary Doctor No    Subjective:     Principal Problem:Pregnancy related pelvic pain, antepartum, third trimester    HPI:  Patient is a 22-year-old  1 para 0 female who presents at 34 weeks and 2 days with complaints of lower abdominal and lower back pain after going to the [facility] all walking her dogs and attending a baby shower yesterday.  She denies leakage of fluid or vaginal bleeding or any regular uterine contractions. Pt of Dr. Tavera.    Obstetric HPI:  Patient reports None contractions, active fetal movement, absent vaginal bleeding , absent loss of fluid      Objective:     Vital Signs (Most Recent):    Vital Signs (24h Range):           There is no height or weight on file to calculate BMI.    FHT: 140  Cat 1 (reassuring)  TOCO:  Q 0cc minutes    No intake or output data in the 24 hours ending 22 2314    Physical Exam    Cervical Exam:  Dilation:  0  Effacement:  0%  Station: -3  Presentation: Vertex     Significant Labs:  Recent Lab Results       22  1640        Appearance, UA Clear       Bilirubin (UA) Negative       Color, UA Light Yellow       Glucose, UA 70        Ketones, UA Trace       Leukocytes, UA Negative       NITRITE UA Negative       Occult Blood UA Negative       pH, UA 6.5       Protein, UA 10        Specific [place], UA 1.015       UROBILINOGEN UA Normal             Assessment/Plan:     22 y.o. female  at 34w2d for:    Active Diagnoses:    Diagnosis Date Noted POA    PRINCIPAL PROBLEM:  Pregnancy related pelvic pain, antepartum, third trimester [O26.893, R10.2] 2022 Unknown    34 weeks gestation of pregnancy [Z3A.34] 2022 Not Applicable      Problems Resolved During this Admission:     Patient  is to wear a maternity support belt as needed.  Follow-up with Dr. Tavera next week.      Chet Ho MD  Obstetrics  [facility] -  Labor and Delivery

## 2022-08-08 ENCOUNTER — ROUTINE PRENATAL (OUTPATIENT)
Dept: OBSTETRICS AND GYNECOLOGY | Facility: CLINIC | Age: 22
End: 2022-08-08
Payer: COMMERCIAL

## 2022-08-08 VITALS
WEIGHT: 181.81 LBS | SYSTOLIC BLOOD PRESSURE: 100 MMHG | DIASTOLIC BLOOD PRESSURE: 60 MMHG | BODY MASS INDEX: 27.64 KG/M2

## 2022-08-08 DIAGNOSIS — R89.9 ABNORMAL LABORATORY TEST RESULT: ICD-10-CM

## 2022-08-08 DIAGNOSIS — Z34.03 ENCOUNTER FOR SUPERVISION OF NORMAL FIRST PREGNANCY, THIRD TRIMESTER: Primary | ICD-10-CM

## 2022-08-08 DIAGNOSIS — Z3A.35 35 WEEKS GESTATION OF PREGNANCY: ICD-10-CM

## 2022-08-08 LAB
BILIRUB SERPL-MCNC: NORMAL MG/DL
BLOOD URINE, POC: NORMAL
COLOR, POC UA: NORMAL
GLUCOSE UR QL STRIP: NORMAL
KETONES UR QL STRIP: NORMAL
LEUKOCYTE ESTERASE URINE, POC: NORMAL
NITRITE, POC UA: NORMAL
PH, POC UA: 8
PROTEIN, POC: NORMAL
SPECIFIC GRAVITY, POC UA: 1.01
UROBILINOGEN, POC UA: 0.2

## 2022-08-08 PROCEDURE — 87491 CHLMYD TRACH DNA AMP PROBE: CPT | Mod: ,,, | Performed by: CLINICAL MEDICAL LABORATORY

## 2022-08-08 PROCEDURE — 87591 N.GONORRHOEAE DNA AMP PROB: CPT | Mod: ,,, | Performed by: CLINICAL MEDICAL LABORATORY

## 2022-08-08 PROCEDURE — 87086 URINE CULTURE/COLONY COUNT: CPT | Mod: ,,, | Performed by: CLINICAL MEDICAL LABORATORY

## 2022-08-08 PROCEDURE — 87591 CHLAMYDIA/GONORRHOEAE(GC), PCR: ICD-10-PCS | Mod: ,,, | Performed by: CLINICAL MEDICAL LABORATORY

## 2022-08-08 PROCEDURE — 87653 CULTURE, GROUP B STREP: ICD-10-PCS | Mod: ,,, | Performed by: CLINICAL MEDICAL LABORATORY

## 2022-08-08 PROCEDURE — 81003 URINALYSIS AUTO W/O SCOPE: CPT | Mod: PBBFAC | Performed by: STUDENT IN AN ORGANIZED HEALTH CARE EDUCATION/TRAINING PROGRAM

## 2022-08-08 PROCEDURE — 87086 CULTURE, URINE: ICD-10-PCS | Mod: ,,, | Performed by: CLINICAL MEDICAL LABORATORY

## 2022-08-08 PROCEDURE — 87653 STREP B DNA AMP PROBE: CPT | Mod: ,,, | Performed by: CLINICAL MEDICAL LABORATORY

## 2022-08-08 PROCEDURE — 0502F PR SUBSEQUENT PRENATAL CARE: ICD-10-PCS | Mod: CPTII,,, | Performed by: STUDENT IN AN ORGANIZED HEALTH CARE EDUCATION/TRAINING PROGRAM

## 2022-08-08 PROCEDURE — 87491 CHLAMYDIA/GONORRHOEAE(GC), PCR: ICD-10-PCS | Mod: ,,, | Performed by: CLINICAL MEDICAL LABORATORY

## 2022-08-08 PROCEDURE — 0502F SUBSEQUENT PRENATAL CARE: CPT | Mod: CPTII,,, | Performed by: STUDENT IN AN ORGANIZED HEALTH CARE EDUCATION/TRAINING PROGRAM

## 2022-08-08 PROCEDURE — 99213 OFFICE O/P EST LOW 20 MIN: CPT | Mod: PBBFAC | Performed by: STUDENT IN AN ORGANIZED HEALTH CARE EDUCATION/TRAINING PROGRAM

## 2022-08-08 RX ORDER — NITROFURANTOIN 25; 75 MG/1; MG/1
100 CAPSULE ORAL 2 TIMES DAILY
Qty: 14 CAPSULE | Refills: 0 | Status: ON HOLD | OUTPATIENT
Start: 2022-08-08 | End: 2022-08-11 | Stop reason: HOSPADM

## 2022-08-08 NOTE — PROGRESS NOTES
Subjective:      Stephanie Cisse is a 22 y.o. female being seen today for her obstetrical visit. She is at 35w3d gestation. Patient reports no complaints. Fetal movement: normal.    Menstrual History:  OB History        1    Para   0    Term   0       0    AB   0    Living           SAB   0    IAB   0    Ectopic   0    Multiple        Live Births                    The following portions of the patient's history were reviewed and updated as appropriate: allergies, current medications, past family history, past medical history, past social history, past surgical history and problem list.    Review of Systems  Pertinent items are noted in HPI.     Objective:      /60   Wt 82.5 kg (181 lb 12.8 oz)   LMP 2021 (Exact Date)   BMI 27.64 kg/m²   FHT:  155 BPM   Uterine Size: 35 cm   Presentation: cephalic        Assessment:      Pregnancy 35 and 3/7 weeks    UTI in pregnancy - did not complete abx      Plan:     Resent abx  Follow up in 1 Week.

## 2022-08-08 NOTE — PROGRESS NOTES
Subjective:      Stephanie Cisse is a 22 y.o. female being seen today for her obstetrical visit. She is at 35w4d gestation. Patient reports no complaints. Fetal movement: normal.    Menstrual History:  OB History        1    Para   0    Term   0       0    AB   0    Living           SAB   0    IAB   0    Ectopic   0    Multiple        Live Births                      The following portions of the patient's history were reviewed and updated as appropriate: allergies, current medications, past family history, past medical history, past social history, past surgical history and problem list.    Review of Systems  Pertinent items are noted in HPI.     Objective:      /60   Wt 82.5 kg (181 lb 12.8 oz)   LMP 2021 (Exact Date)   BMI 27.64 kg/m²   FHT:  155 BPM   Uterine Size: 35 cm            Assessment:      Pregnancy 35 and 3/7 weeks     Plan:      28-week labs reviewed, normal  GBS and chlamydia/gc pcr collected   Follow up in 1 Week.

## 2022-08-09 LAB
CHLAMYDIA BY PCR: NEGATIVE
N. GONORRHOEAE (GC) BY PCR: NEGATIVE

## 2022-08-10 LAB
CULTURE, GROUP B STREP: NORMAL
UA COMPLETE W REFLEX CULTURE PNL UR: NORMAL

## 2022-08-11 ENCOUNTER — HOSPITAL ENCOUNTER (OUTPATIENT)
Facility: HOSPITAL | Age: 22
Discharge: HOME | End: 2022-08-11
Attending: OBSTETRICS & GYNECOLOGY | Admitting: OBSTETRICS & GYNECOLOGY
Payer: COMMERCIAL

## 2022-08-11 VITALS
OXYGEN SATURATION: 99 % | HEART RATE: 72 BPM | WEIGHT: 181 LBS | TEMPERATURE: 97 F | BODY MASS INDEX: 27.43 KG/M2 | HEIGHT: 68 IN | DIASTOLIC BLOOD PRESSURE: 64 MMHG | RESPIRATION RATE: 18 BRPM | SYSTOLIC BLOOD PRESSURE: 112 MMHG

## 2022-08-11 DIAGNOSIS — Z34.90 PREGNANT AND NOT YET DELIVERED: ICD-10-CM

## 2022-08-11 DIAGNOSIS — R10.2 PREGNANCY RELATED PELVIC PAIN, ANTEPARTUM, THIRD TRIMESTER: ICD-10-CM

## 2022-08-11 DIAGNOSIS — O26.893 PREGNANCY RELATED PELVIC PAIN, ANTEPARTUM, THIRD TRIMESTER: ICD-10-CM

## 2022-08-11 PROBLEM — R55 SYNCOPE: Status: RESOLVED | Noted: 2022-03-07 | Resolved: 2022-08-11

## 2022-08-11 PROBLEM — Z34.91 ENCOUNTER FOR SUPERVISION OF NORMAL PREGNANCY IN FIRST TRIMESTER: Status: RESOLVED | Noted: 2022-01-19 | Resolved: 2022-08-11

## 2022-08-11 PROBLEM — Z3A.35 35 WEEKS GESTATION OF PREGNANCY: Status: ACTIVE | Noted: 2022-01-19

## 2022-08-11 LAB
BILIRUB UR QL STRIP: NEGATIVE
CLARITY UR: CLEAR
COLOR UR: YELLOW
GLUCOSE UR STRIP-MCNC: NORMAL MG/DL
KETONES UR STRIP-SCNC: NEGATIVE MG/DL
LEUKOCYTE ESTERASE UR QL STRIP: NEGATIVE
NITRITE UR QL STRIP: NEGATIVE
PH UR STRIP: 6.5 PH UNITS
PROT UR QL STRIP: 20
RBC # UR STRIP: NEGATIVE /UL
SP GR UR STRIP: 1.02
UROBILINOGEN UR STRIP-ACNC: NORMAL MG/DL

## 2022-08-11 PROCEDURE — 81003 URINALYSIS AUTO W/O SCOPE: CPT | Performed by: OBSTETRICS & GYNECOLOGY

## 2022-08-11 PROCEDURE — 99211 OFF/OP EST MAY X REQ PHY/QHP: CPT

## 2022-08-12 NOTE — PROGRESS NOTES
[facility] -  Labor and Delivery  Obstetrics  Antepartum Progress Note    Patient Name: Stephanie Cisse  MRN: [id number]  Admission Date: 2022  Hospital Length of Stay: 0 days  Attending Physician: Chet Ho MD  Primary Care Provider: Primary Doctor No    Subjective:     Principal Problem:Pregnancy related pelvic pain, antepartum, third trimester    HPI:  Patient is a 22-year-old  1 para 0 who presents at 35 weeks and 6 days with complaints of lower abdominal pelvic pressure and pain that began earlier this afternoon at work.  She also reports sharp shooting pain that goes down the side of the vagina when she is laying on her side again consistent with round ligament pain.    Obstetric HPI:  Patient reports occasional contractions, active fetal movement, absent vaginal bleeding , absent loss of fluid      Objective:     Vital Signs (Most Recent):  Temp: 96.5 °F (35.8 °C) (22)  Pulse: 72 (22)  Resp: 18 (22)  BP: 112/64 (22)  SpO2: 99 % (22) Vital Signs (24h Range):  Temp:  [96.5 °F (35.8 °C)] 96.5 °F (35.8 °C)  Pulse:  [72] 72  Resp:  [18] 18  SpO2:  [99 %] 99 %  BP: (112)/(64) 112/64     Weight: 82.1 kg (181 lb)  Body mass index is 27.53 kg/m².    FHT: 140 Cat 1 (reassuring)  TOCO:  Occasional contraction    No intake or output data in the 24 hours ending 22    Physical Exam:   Constitutional: She appears well-developed and well-nourished. No distress.       Cardiovascular: Normal rate.     Pulmonary/Chest: Effort normal. No respiratory distress.                  Musculoskeletal: Moves all extremeties.       Neurological: She is alert.    Skin: Skin is warm.    Psychiatric: She has a normal mood and affect. Her behavior is normal.       Cervical Exam: Per Nurse  Dilation:  0  Effacement:  0%  Station: -3  Presentation: Vertex     Significant Labs:  Recent Lab Results       22        Appearance, UA Clear        Bilirubin (UA) Negative       Color, UA Yellow       Glucose, UA Normal       Ketones, UA Negative       Leukocytes, UA Negative       NITRITE UA Negative       Occult Blood UA Negative       pH, UA 6.5       Protein, UA 20        Specific Gravity, UA 1.018       UROBILINOGEN UA Normal             Assessment/Plan:     22 y.o. female  at 35w6d for:    Active Diagnoses:    Diagnosis Date Noted POA    PRINCIPAL PROBLEM:  Pregnancy related pelvic pain, antepartum, third trimester [O26.893, R10.2] 2022 Yes    35 weeks gestation of pregnancy [Z3A.35] 2022 Not Applicable      Problems Resolved During this Admission:       Patient to follow-up with Dr. Tavera as previously scheduled.        Chet Ho MD  Obstetrics  [facility] -  Labor and Delivery

## 2022-08-16 ENCOUNTER — ROUTINE PRENATAL (OUTPATIENT)
Dept: OBSTETRICS AND GYNECOLOGY | Facility: CLINIC | Age: 22
End: 2022-08-16
Payer: COMMERCIAL

## 2022-08-16 ENCOUNTER — HOSPITAL ENCOUNTER (OUTPATIENT)
Dept: RADIOLOGY | Facility: HOSPITAL | Age: 22
Discharge: HOME | End: 2022-08-16
Attending: STUDENT IN AN ORGANIZED HEALTH CARE EDUCATION/TRAINING PROGRAM
Payer: COMMERCIAL

## 2022-08-16 VITALS
BODY MASS INDEX: 28.07 KG/M2 | WEIGHT: 184.63 LBS | DIASTOLIC BLOOD PRESSURE: 70 MMHG | SYSTOLIC BLOOD PRESSURE: 110 MMHG

## 2022-08-16 DIAGNOSIS — Z3A.35 35 WEEKS GESTATION OF PREGNANCY: ICD-10-CM

## 2022-08-16 DIAGNOSIS — R51.9 HEADACHE IN PREGNANCY, THIRD TRIMESTER: ICD-10-CM

## 2022-08-16 DIAGNOSIS — O26.893 HEADACHE IN PREGNANCY, THIRD TRIMESTER: ICD-10-CM

## 2022-08-16 DIAGNOSIS — Z3A.36 36 WEEKS GESTATION OF PREGNANCY: ICD-10-CM

## 2022-08-16 LAB
BILIRUB SERPL-MCNC: NEGATIVE MG/DL
BLOOD URINE, POC: NEGATIVE
COLOR, POC UA: NORMAL
GLUCOSE UR QL STRIP: NEGATIVE
KETONES UR QL STRIP: NEGATIVE
LEUKOCYTE ESTERASE URINE, POC: NEGATIVE
NITRITE, POC UA: NEGATIVE
PH, POC UA: 7
PROTEIN, POC: NEGATIVE
SPECIFIC GRAVITY, POC UA: 1
UROBILINOGEN, POC UA: 0.2

## 2022-08-16 PROCEDURE — 76816 OB US FOLLOW-UP PER FETUS: CPT | Mod: 26,,, | Performed by: RADIOLOGY

## 2022-08-16 PROCEDURE — 76816 US OB FOLLOW UP EA GESTATION TRANSABDOMINAL: ICD-10-PCS | Mod: 26,,, | Performed by: RADIOLOGY

## 2022-08-16 PROCEDURE — 99213 OFFICE O/P EST LOW 20 MIN: CPT | Mod: PBBFAC | Performed by: STUDENT IN AN ORGANIZED HEALTH CARE EDUCATION/TRAINING PROGRAM

## 2022-08-16 PROCEDURE — 81003 URINALYSIS AUTO W/O SCOPE: CPT | Mod: PBBFAC | Performed by: STUDENT IN AN ORGANIZED HEALTH CARE EDUCATION/TRAINING PROGRAM

## 2022-08-16 PROCEDURE — 76816 OB US FOLLOW-UP PER FETUS: CPT | Mod: TC

## 2022-08-16 PROCEDURE — 0502F PR SUBSEQUENT PRENATAL CARE: ICD-10-PCS | Mod: CPTII,,, | Performed by: STUDENT IN AN ORGANIZED HEALTH CARE EDUCATION/TRAINING PROGRAM

## 2022-08-16 PROCEDURE — 0502F SUBSEQUENT PRENATAL CARE: CPT | Mod: CPTII,,, | Performed by: STUDENT IN AN ORGANIZED HEALTH CARE EDUCATION/TRAINING PROGRAM

## 2022-08-16 NOTE — PROGRESS NOTES
Subjective:      Stephanie Cisse is a 22 y.o. female being seen today for her obstetrical visit. She is at 36w4d gestation. Patient reports no complaints. Fetal movement: normal.    Menstrual History:  OB History        1    Para   0    Term   0       0    AB   0    Living           SAB   0    IAB   0    Ectopic   0    Multiple        Live Births                    The following portions of the patient's history were reviewed and updated as appropriate: allergies, current medications, past family history, past medical history, past social history, past surgical history and problem list.    Review of Systems  Pertinent items are noted in HPI.     Objective:      /70   Wt 83.7 kg (184 lb 9.6 oz)   LMP 2021 (Exact Date)   BMI 28.07 kg/m²   FHT:  152 BPM   Uterine Size: 36 cm   Presentation: breech    Cvx: deferred    Assessment:      Pregnancy 36 and 4/7 weeks    Breech presentation    Plan:     Discussed mode of delivery  Plan for PLTCS at 39 weeks if still breech  Follow up in 1 Week.

## 2022-08-25 ENCOUNTER — ROUTINE PRENATAL (OUTPATIENT)
Dept: OBSTETRICS AND GYNECOLOGY | Facility: CLINIC | Age: 22
End: 2022-08-25
Payer: COMMERCIAL

## 2022-08-25 VITALS
BODY MASS INDEX: 29.26 KG/M2 | HEART RATE: 80 BPM | WEIGHT: 192.38 LBS | SYSTOLIC BLOOD PRESSURE: 130 MMHG | DIASTOLIC BLOOD PRESSURE: 78 MMHG

## 2022-08-25 DIAGNOSIS — O26.893 HEADACHE IN PREGNANCY, THIRD TRIMESTER: ICD-10-CM

## 2022-08-25 DIAGNOSIS — Z3A.37 37 WEEKS GESTATION OF PREGNANCY: ICD-10-CM

## 2022-08-25 DIAGNOSIS — R51.9 HEADACHE IN PREGNANCY, THIRD TRIMESTER: ICD-10-CM

## 2022-08-25 LAB
BILIRUB SERPL-MCNC: NEGATIVE MG/DL
BLOOD URINE, POC: NEGATIVE
COLOR, POC UA: NORMAL
GLUCOSE UR QL STRIP: NEGATIVE
KETONES UR QL STRIP: NORMAL
LEUKOCYTE ESTERASE URINE, POC: NEGATIVE
NITRITE, POC UA: NEGATIVE
PH, POC UA: 7.5
PROTEIN, POC: NEGATIVE
SPECIFIC GRAVITY, POC UA: 1
UROBILINOGEN, POC UA: 0.2

## 2022-08-25 PROCEDURE — 0502F SUBSEQUENT PRENATAL CARE: CPT | Mod: CPTII,,, | Performed by: STUDENT IN AN ORGANIZED HEALTH CARE EDUCATION/TRAINING PROGRAM

## 2022-08-25 PROCEDURE — 99214 OFFICE O/P EST MOD 30 MIN: CPT | Mod: PBBFAC | Performed by: STUDENT IN AN ORGANIZED HEALTH CARE EDUCATION/TRAINING PROGRAM

## 2022-08-25 PROCEDURE — 81003 URINALYSIS AUTO W/O SCOPE: CPT | Mod: PBBFAC | Performed by: STUDENT IN AN ORGANIZED HEALTH CARE EDUCATION/TRAINING PROGRAM

## 2022-08-25 PROCEDURE — 0502F PR SUBSEQUENT PRENATAL CARE: ICD-10-PCS | Mod: CPTII,,, | Performed by: STUDENT IN AN ORGANIZED HEALTH CARE EDUCATION/TRAINING PROGRAM

## 2022-08-25 RX ORDER — SODIUM CHLORIDE, SODIUM LACTATE, POTASSIUM CHLORIDE, CALCIUM CHLORIDE 600; 310; 30; 20 MG/100ML; MG/100ML; MG/100ML; MG/100ML
INJECTION, SOLUTION INTRAVENOUS CONTINUOUS
Status: CANCELLED | OUTPATIENT
Start: 2022-08-25

## 2022-08-25 RX ORDER — OXYTOCIN/RINGER'S LACTATE 30/500 ML
95 PLASTIC BAG, INJECTION (ML) INTRAVENOUS ONCE
Status: CANCELLED | OUTPATIENT
Start: 2022-08-25 | End: 2022-08-25

## 2022-08-25 RX ORDER — CARBOPROST TROMETHAMINE 250 UG/ML
250 INJECTION, SOLUTION INTRAMUSCULAR
Status: CANCELLED | OUTPATIENT
Start: 2022-08-25

## 2022-08-25 RX ORDER — MISOPROSTOL 100 UG/1
800 TABLET ORAL
Status: CANCELLED | OUTPATIENT
Start: 2022-08-25

## 2022-08-25 RX ORDER — MUPIROCIN 20 MG/G
OINTMENT TOPICAL
Status: CANCELLED | OUTPATIENT
Start: 2022-08-25

## 2022-08-25 RX ORDER — SODIUM CITRATE AND CITRIC ACID MONOHYDRATE 334; 500 MG/5ML; MG/5ML
30 SOLUTION ORAL
Status: CANCELLED | OUTPATIENT
Start: 2022-08-25

## 2022-08-25 RX ORDER — METHYLERGONOVINE MALEATE 0.2 MG/ML
200 INJECTION INTRAVENOUS
Status: CANCELLED | OUTPATIENT
Start: 2022-08-25

## 2022-08-25 RX ORDER — OXYTOCIN/RINGER'S LACTATE 30/500 ML
334 PLASTIC BAG, INJECTION (ML) INTRAVENOUS ONCE
Status: CANCELLED | OUTPATIENT
Start: 2022-08-25 | End: 2022-08-25

## 2022-08-25 RX ORDER — FAMOTIDINE 10 MG/ML
20 INJECTION INTRAVENOUS
Status: CANCELLED | OUTPATIENT
Start: 2022-08-25

## 2022-08-26 ENCOUNTER — HOSPITAL ENCOUNTER (OUTPATIENT)
Facility: HOSPITAL | Age: 22
LOS: 1 days | Discharge: HOME | End: 2022-08-26
Attending: OBSTETRICS & GYNECOLOGY | Admitting: OBSTETRICS & GYNECOLOGY
Payer: COMMERCIAL

## 2022-08-26 VITALS
HEIGHT: 67 IN | WEIGHT: 193.19 LBS | RESPIRATION RATE: 18 BRPM | HEART RATE: 65 BPM | BODY MASS INDEX: 30.32 KG/M2 | SYSTOLIC BLOOD PRESSURE: 132 MMHG | DIASTOLIC BLOOD PRESSURE: 72 MMHG | TEMPERATURE: 96 F

## 2022-08-26 DIAGNOSIS — Z34.90 PREGNANCY: ICD-10-CM

## 2022-08-26 PROBLEM — O47.1 FALSE LABOR AFTER 37 COMPLETED WEEKS OF GESTATION: Status: ACTIVE | Noted: 2022-08-26

## 2022-08-26 PROBLEM — Z3A.38 38 WEEKS GESTATION OF PREGNANCY: Status: ACTIVE | Noted: 2022-01-19

## 2022-08-26 PROCEDURE — 99211 OFF/OP EST MAY X REQ PHY/QHP: CPT

## 2022-08-26 NOTE — SUBJECTIVE & OBJECTIVE
Obstetric HPI:  Patient reports None contractions, active fetal movement, No vaginal bleeding , Yes loss of fluid     This pregnancy has been complicated by   See pnr no changes    OB History    Para Term  AB Living   1 0 0 0 0 0   SAB IAB Ectopic Multiple Live Births   0 0 0 0 0      # Outcome Date GA Lbr Misael/2nd Weight Sex Delivery Anes PTL Lv   1 Current              Past Medical History:   Diagnosis Date    34 weeks gestation of pregnancy 2022    Estimated Date of Delivery: 22 established by LMP, 6w6d US consistent  First Trimester - Prenatal Battery: WNL - UDS: neg - Pap Smear: NILM - GC/C: neg  Second Trimester - Anatomy Ultrasound: no abnormalities  Third Trimester - 28 wk labs:  - Rh Status: O pos - 1Hr GCT:  - Fetal Kick Count: - GC/C:  - GBS:   Vaccines - Influenza: discussed - TDAP: Plan to give around 32wks - COVID: discussed      Acne     Hypoglycemia      No past surgical history on file.    PTA Medications   Medication Sig    butalbital-acetaminophen-caffeine -40 mg (FIORICET, ESGIC) -40 mg per tablet Take 1 tablet by mouth every 4 (four) hours as needed for Pain or Headaches. (Patient not taking: Reported on 2022)    pediatric multivit-iron-min (FLINTSTONES COMPLETE, IRON,) Chew Take 1 tablet by mouth once daily.       Review of patient's allergies indicates:  No Known Allergies     Family History       Problem Relation (Age of Onset)    Clotting disorder Mother, Sister    Heart disease Mother    No Known Problems Father, Brother, Sister, Brother, Sister    Protein S deficiency Mother    Stroke Mother          Tobacco Use    Smoking status: Never Smoker    Smokeless tobacco: Never Used   Substance and Sexual Activity    Alcohol use: Never    Drug use: Not Currently    Sexual activity: Yes     Partners: Male     Birth control/protection: None     Review of Systems   Constitutional:  Negative for fatigue and fever.   Respiratory:  Negative for cough and shortness  of breath.    Cardiovascular:  Negative for chest pain and leg swelling.   Gastrointestinal:  Negative for abdominal pain, constipation, diarrhea, nausea and vomiting.   Endocrine: Negative for diabetes, hyperthyroidism and hypothyroidism.   Genitourinary:  Positive for vaginal discharge. Negative for dysuria, flank pain, frequency, genital sores, hematuria, pelvic pain and vaginal bleeding.   Musculoskeletal:  Negative for back pain and leg pain.   Integumentary:  Negative for rash and nipple discharge.   Neurological:  Negative for syncope and headaches.   Psychiatric/Behavioral:  Negative for depression. The patient is not nervous/anxious.    Breast: Negative for nipple discharge   Objective:     Vital Signs (Most Recent):    Vital Signs (24h Range):           There is no height or weight on file to calculate BMI.    FHT: 140 Cat 1 (reassuring)  TOCO:  Q 0 minutes  Nitrazine negative  Negative pooliong    Physical Exam:   Constitutional: She is oriented to person, place, and time. She appears well-developed and well-nourished. No distress.    HENT:   Head: Normocephalic and atraumatic.    Eyes: EOM are normal.    Neck: No thyromegaly present.    Cardiovascular:  Normal rate, regular rhythm and normal heart sounds.             Pulmonary/Chest: Effort normal and breath sounds normal.        Abdominal: Soft. Bowel sounds are normal. She exhibits no distension. There is no abdominal tenderness.     Genitourinary:    Vagina normal.   No  no vaginal discharge in the vagina.           Musculoskeletal: Normal range of motion and moves all extremeties. No edema.       Neurological: She is alert and oriented to person, place, and time. She has normal reflexes.    Skin: Skin is warm and dry.    Psychiatric: She has a normal mood and affect. Her behavior is normal. Judgment and thought content normal.     Cervix:  Dilation:  1  Effacement:  25%  Station: -3  Presentation: Breech     Significant Labs:  Lab Results   Component  Value Date    GROUPTRH O POS 01/19/2022    HEPBSAG Non-Reactive 01/19/2022       I have personallly reviewed all pertinent lab results from the last 24 hours.

## 2022-08-26 NOTE — PROGRESS NOTES
Subjective:      Stephanie Cisse is a 22 y.o. female being seen today for her obstetrical visit. She is at 37w6d gestation. Patient reports no bleeding, no contractions, and no leaking. Fetal movement: normal.    Menstrual History:  OB History          1    Para   0    Term   0       0    AB   0    Living             SAB   0    IAB   0    Ectopic   0    Multiple        Live Births                      The following portions of the patient's history were reviewed and updated as appropriate: allergies, current medications, past family history, past medical history, past social history, past surgical history, and problem list.    Review of Systems  Pertinent items are noted in HPI.     Objective:      /78   Pulse 80   Wt 87.3 kg (192 lb 6.4 oz)   LMP 2021 (Exact Date)   BMI 29.26 kg/m²   FHT: 133 BPM   Uterine Size: 37 cm   Presentations: breech   Pelvic Exam:               Dilation: Closed        Effacement: 50%              Station:  -3     Consistency: medium             Position: posterior        Assessment:      Pregnancy 37 and 6/7 weeks   Breech    Plan:    Plans for delivery: C/Section scheduled  Beta strep culture: results reviewed  Counseling: L&D discussion: symptoms of labor, rupture of membranes, hospital length of stay, and discussed hospital routine.  Follow up in 1 Week.

## 2022-08-26 NOTE — H&P
[facility] -  Labor and Delivery  Obstetrics  History & Physical    Patient Name: Stephanie Cisse  MRN: [id number]  Admission Date: 2022  Primary Care Provider: Primary Doctor No    Subjective:     Principal Problem:<principal problem not specified>    History of Present Illness:   IUP @38 weeks with breech presentation c/o lof      Obstetric HPI:  Patient reports None contractions, active fetal movement, No vaginal bleeding , Yes loss of fluid     This pregnancy has been complicated by   See pnr no changes    OB History    Para Term  AB Living   1 0 0 0 0 0   SAB IAB Ectopic Multiple Live Births   0 0 0 0 0      # Outcome Date GA Lbr Misael/2nd Weight Sex Delivery Anes PTL Lv   1 Current              Past Medical History:   Diagnosis Date    34 weeks gestation of pregnancy 2022    Estimated Date of Delivery: 22 established by LMP, 6w6d US consistent  First Trimester - Prenatal Battery: WNL - UDS: neg - Pap Smear: NILM - GC/C: neg  Second Trimester - Anatomy Ultrasound: no abnormalities  Third Trimester - 28 wk labs:  - Rh Status: O pos - 1Hr GCT:  - Fetal Kick Count: - GC/C:  - GBS:   Vaccines - Influenza: discussed - TDAP: Plan to give around 32wks - COVID: discussed      Acne     Hypoglycemia      No past surgical history on file.    PTA Medications   Medication Sig    butalbital-acetaminophen-caffeine -40 mg (FIORICET, ESGIC) -40 mg per tablet Take 1 tablet by mouth every 4 (four) hours as needed for Pain or Headaches. (Patient not taking: Reported on 2022)    pediatric multivit-iron-min (FLINTSTONES COMPLETE, IRON,) Chew Take 1 tablet by mouth once daily.       Review of patient's allergies indicates:  No Known Allergies     Family History       Problem Relation (Age of Onset)    Clotting disorder Mother, Sister    Heart disease Mother    No Known Problems Father, Brother, Sister, Brother, Sister    Protein S deficiency Mother    Stroke Mother           Tobacco Use    Smoking status: Never Smoker    Smokeless tobacco: Never Used   Substance and Sexual Activity    Alcohol use: Never    Drug use: Not Currently    Sexual activity: Yes     Partners: Male     Birth control/protection: None     Review of Systems   Constitutional:  Negative for fatigue and fever.   Respiratory:  Negative for cough and shortness of breath.    Cardiovascular:  Negative for chest pain and leg swelling.   Gastrointestinal:  Negative for abdominal pain, constipation, diarrhea, nausea and vomiting.   Endocrine: Negative for diabetes, hyperthyroidism and hypothyroidism.   Genitourinary:  Positive for vaginal discharge. Negative for dysuria, flank pain, frequency, genital sores, hematuria, pelvic pain and vaginal bleeding.   Musculoskeletal:  Negative for back pain and leg pain.   Integumentary:  Negative for rash and nipple discharge.   Neurological:  Negative for syncope and headaches.   Psychiatric/Behavioral:  Negative for depression. The patient is not nervous/anxious.    Breast: Negative for nipple discharge   Objective:     Vital Signs (Most Recent):    Vital Signs (24h Range):           There is no height or weight on file to calculate BMI.    FHT: 140 Cat 1 (reassuring)  TOCO:  Q 0 minutes  Nitrazine negative  Negative pooliong    Physical Exam:   Constitutional: She is oriented to person, place, and time. She appears well-developed and well-nourished. No distress.    HENT:   Head: Normocephalic and atraumatic.    Eyes: EOM are normal.    Neck: No thyromegaly present.    Cardiovascular:  Normal rate, regular rhythm and normal heart sounds.             Pulmonary/Chest: Effort normal and breath sounds normal.        Abdominal: Soft. Bowel sounds are normal. She exhibits no distension. There is no abdominal tenderness.     Genitourinary:    Vagina normal.   No  no vaginal discharge in the vagina.           Musculoskeletal: Normal range of motion and moves all extremeties. No edema.        Neurological: She is alert and oriented to person, place, and time. She has normal reflexes.    Skin: Skin is warm and dry.    Psychiatric: She has a normal mood and affect. Her behavior is normal. Judgment and thought content normal.     Cervix:  Dilation:  1  Effacement:  25%  Station: -3  Presentation: Breech     Significant Labs:  Lab Results   Component Value Date    GROUPTRH O POS 2022    HEPBSAG Non-Reactive 2022       I have personallly reviewed all pertinent lab results from the last 24 hours.    Assessment/Plan:     22 y.o. female  at 38w0d for:    False labor after 37 completed weeks of gestation  0    Breech presentation, no version  Primary c/s scheduled at 39 weeks    38 weeks gestation of pregnancy  Labor precautions reviewed  FKC reviewed and ER precautions for decreased FM discussed        Digna Leslie  Obstetrics  [facility] -  Labor and Delivery

## 2022-08-31 ENCOUNTER — ROUTINE PRENATAL (OUTPATIENT)
Dept: OBSTETRICS AND GYNECOLOGY | Facility: CLINIC | Age: 22
End: 2022-08-31
Payer: COMMERCIAL

## 2022-08-31 VITALS
SYSTOLIC BLOOD PRESSURE: 120 MMHG | DIASTOLIC BLOOD PRESSURE: 70 MMHG | BODY MASS INDEX: 30.48 KG/M2 | WEIGHT: 194.63 LBS

## 2022-08-31 DIAGNOSIS — R51.9 HEADACHE IN PREGNANCY, THIRD TRIMESTER: ICD-10-CM

## 2022-08-31 DIAGNOSIS — R82.998 LEUKOCYTES IN URINE: ICD-10-CM

## 2022-08-31 DIAGNOSIS — O26.893 HEADACHE IN PREGNANCY, THIRD TRIMESTER: ICD-10-CM

## 2022-08-31 DIAGNOSIS — Z3A.38 38 WEEKS GESTATION OF PREGNANCY: ICD-10-CM

## 2022-08-31 LAB
BILIRUB SERPL-MCNC: NORMAL MG/DL
BLOOD URINE, POC: NORMAL
COLOR, POC UA: NORMAL
GLUCOSE UR QL STRIP: NORMAL
KETONES UR QL STRIP: NORMAL
LEUKOCYTE ESTERASE URINE, POC: NORMAL
NITRITE, POC UA: NORMAL
PH, POC UA: 6
PROTEIN, POC: NORMAL
SPECIFIC GRAVITY, POC UA: 1
UROBILINOGEN, POC UA: 0.2

## 2022-08-31 PROCEDURE — 87086 URINE CULTURE/COLONY COUNT: CPT | Mod: ,,, | Performed by: CLINICAL MEDICAL LABORATORY

## 2022-08-31 PROCEDURE — 0502F PR SUBSEQUENT PRENATAL CARE: ICD-10-PCS | Mod: CPTII,,, | Performed by: STUDENT IN AN ORGANIZED HEALTH CARE EDUCATION/TRAINING PROGRAM

## 2022-08-31 PROCEDURE — 87086 CULTURE, URINE: ICD-10-PCS | Mod: ,,, | Performed by: CLINICAL MEDICAL LABORATORY

## 2022-08-31 PROCEDURE — 0502F SUBSEQUENT PRENATAL CARE: CPT | Mod: CPTII,,, | Performed by: STUDENT IN AN ORGANIZED HEALTH CARE EDUCATION/TRAINING PROGRAM

## 2022-08-31 PROCEDURE — 81003 URINALYSIS AUTO W/O SCOPE: CPT | Mod: PBBFAC | Performed by: STUDENT IN AN ORGANIZED HEALTH CARE EDUCATION/TRAINING PROGRAM

## 2022-08-31 PROCEDURE — 99213 OFFICE O/P EST LOW 20 MIN: CPT | Mod: PBBFAC | Performed by: STUDENT IN AN ORGANIZED HEALTH CARE EDUCATION/TRAINING PROGRAM

## 2022-08-31 NOTE — PROGRESS NOTES
Subjective:      Stephanie Cisse is a 22 y.o. female being seen today for her obstetrical visit. She is at 38w5d gestation. Patient reports no bleeding, no contractions, no cramping, and no leaking. Fetal movement: normal.    Menstrual History:  OB History          1    Para   0    Term   0       0    AB   0    Living             SAB   0    IAB   0    Ectopic   0    Multiple        Live Births                      The following portions of the patient's history were reviewed and updated as appropriate: allergies, current medications, past family history, past medical history, past social history, past surgical history, and problem list.    Review of Systems  Pertinent items are noted in HPI.     Objective:      /70   Wt 88.3 kg (194 lb 9.6 oz)   LMP 2021 (Exact Date)   BMI 30.48 kg/m²   FHT: 137 BPM   Uterine Size: 39 cm   Presentations: cephalic   Pelvic Exam:               Dilation: fingertip        Effacement: 25%              Station:  -3     Consistency: medium             Position: middle        Assessment:      Pregnancy 38 and 5/7 weeks     Plan:    Plans for delivery: C/Section scheduled  Beta strep culture: results reviewed  Counseling: L&D discussion: symptoms of labor, rupture of membranes, hospital length of stay, discussed when to call, and discussed hospital routine.

## 2022-09-02 ENCOUNTER — ANESTHESIA (OUTPATIENT)
Dept: OBSTETRICS AND GYNECOLOGY | Facility: HOSPITAL | Age: 22
End: 2022-09-02
Payer: COMMERCIAL

## 2022-09-02 ENCOUNTER — HOSPITAL ENCOUNTER (INPATIENT)
Facility: HOSPITAL | Age: 22
LOS: 2 days | Discharge: HOME | End: 2022-09-04
Attending: STUDENT IN AN ORGANIZED HEALTH CARE EDUCATION/TRAINING PROGRAM | Admitting: STUDENT IN AN ORGANIZED HEALTH CARE EDUCATION/TRAINING PROGRAM
Payer: COMMERCIAL

## 2022-09-02 ENCOUNTER — ANESTHESIA EVENT (OUTPATIENT)
Dept: OBSTETRICS AND GYNECOLOGY | Facility: HOSPITAL | Age: 22
End: 2022-09-02
Payer: COMMERCIAL

## 2022-09-02 DIAGNOSIS — Z3A.39 39 WEEKS GESTATION OF PREGNANCY: ICD-10-CM

## 2022-09-02 LAB
BASOPHILS # BLD AUTO: 0.02 K/UL (ref 0–0.2)
BASOPHILS NFR BLD AUTO: 0.2 % (ref 0–1)
DIFFERENTIAL METHOD BLD: ABNORMAL
EOSINOPHIL # BLD AUTO: 0.06 K/UL (ref 0–0.5)
EOSINOPHIL NFR BLD AUTO: 0.5 % (ref 1–4)
ERYTHROCYTE [DISTWIDTH] IN BLOOD BY AUTOMATED COUNT: 12.5 % (ref 11.5–14.5)
HCO3 UR-SCNC: 22.4 MMOL/L
HCO3 UR-SCNC: 25.4 MMOL/L
HCT VFR BLD AUTO: 31.5 % (ref 38–47)
HGB BLD-MCNC: 10.7 G/DL (ref 12–16)
IMM GRANULOCYTES # BLD AUTO: 0.1 K/UL (ref 0–0.04)
IMM GRANULOCYTES NFR BLD: 0.9 % (ref 0–0.4)
INDIRECT COOMBS: NORMAL
LYMPHOCYTES # BLD AUTO: 1.57 K/UL (ref 1–4.8)
LYMPHOCYTES NFR BLD AUTO: 14.2 % (ref 27–41)
MCH RBC QN AUTO: 30.1 PG (ref 27–31)
MCHC RBC AUTO-ENTMCNC: 34 G/DL (ref 32–36)
MCV RBC AUTO: 88.7 FL (ref 80–96)
MONOCYTES # BLD AUTO: 0.94 K/UL (ref 0–0.8)
MONOCYTES NFR BLD AUTO: 8.5 % (ref 2–6)
MPC BLD CALC-MCNC: 10.9 FL (ref 9.4–12.4)
NEUTROPHILS # BLD AUTO: 8.35 K/UL (ref 1.8–7.7)
NEUTROPHILS NFR BLD AUTO: 75.7 % (ref 53–65)
NRBC # BLD AUTO: 0 X10E3/UL
NRBC, AUTO (.00): 0 %
PCO2 BLDA: 46 MMHG
PCO2 BLDA: 51 MMHG (ref 35–48)
PH SMN: 7.25 [PH] (ref 7.35–7.45)
PH SMN: 7.35 [PH]
PLATELET # BLD AUTO: 179 K/UL (ref 150–400)
PO2 BLDA: 24 MMHG (ref 25–40)
PO2 BLDA: 30 MMHG
POC A-ADO2: ABNORMAL MMHG
POC BASE EXCESS: -0.6 MMOL/L
POC BASE EXCESS: -5.4 MMOL/L (ref -2–3)
POC SATURATED O2: 54.9 %
POC SATURATED O2: 68.5 % (ref 95–98)
RBC # BLD AUTO: 3.55 M/UL (ref 4.2–5.4)
RH BLD: NORMAL
SARS-COV+SARS-COV-2 AG RESP QL IA.RAPID: NEGATIVE
UA COMPLETE W REFLEX CULTURE PNL UR: NO GROWTH
WBC # BLD AUTO: 11.04 K/UL (ref 4.5–11)

## 2022-09-02 PROCEDURE — 37000008 HC ANESTHESIA 1ST 15 MINUTES: Performed by: STUDENT IN AN ORGANIZED HEALTH CARE EDUCATION/TRAINING PROGRAM

## 2022-09-02 PROCEDURE — 87426 SARSCOV CORONAVIRUS AG IA: CPT | Performed by: STUDENT IN AN ORGANIZED HEALTH CARE EDUCATION/TRAINING PROGRAM

## 2022-09-02 PROCEDURE — 59515 CESAREAN DELIVERY: CPT | Mod: CRNA,,, | Performed by: NURSE ANESTHETIST, CERTIFIED REGISTERED

## 2022-09-02 PROCEDURE — 59515 PRA REAN DELIVERY+POSTPARTUM CARE: ICD-10-PCS | Mod: CRNA,,, | Performed by: NURSE ANESTHETIST, CERTIFIED REGISTERED

## 2022-09-02 PROCEDURE — 51702 INSERT TEMP BLADDER CATH: CPT

## 2022-09-02 PROCEDURE — 25000003 PHARM REV CODE 250: Performed by: NURSE ANESTHETIST, CERTIFIED REGISTERED

## 2022-09-02 PROCEDURE — 71000033 HC RECOVERY, INTIAL HOUR: Performed by: STUDENT IN AN ORGANIZED HEALTH CARE EDUCATION/TRAINING PROGRAM

## 2022-09-02 PROCEDURE — 63600175 PHARM REV CODE 636 W HCPCS: Performed by: STUDENT IN AN ORGANIZED HEALTH CARE EDUCATION/TRAINING PROGRAM

## 2022-09-02 PROCEDURE — 36415 COLL VENOUS BLD VENIPUNCTURE: CPT | Performed by: STUDENT IN AN ORGANIZED HEALTH CARE EDUCATION/TRAINING PROGRAM

## 2022-09-02 PROCEDURE — 25000003 PHARM REV CODE 250: Performed by: STUDENT IN AN ORGANIZED HEALTH CARE EDUCATION/TRAINING PROGRAM

## 2022-09-02 PROCEDURE — 27201423 OPTIME MED/SURG SUP & DEVICES STERILE SUPPLY: Performed by: STUDENT IN AN ORGANIZED HEALTH CARE EDUCATION/TRAINING PROGRAM

## 2022-09-02 PROCEDURE — 25000003 PHARM REV CODE 250: Performed by: ANESTHESIOLOGY

## 2022-09-02 PROCEDURE — 36004725 HC OB OR TIME LEV III - EA ADD 15 MIN: Performed by: STUDENT IN AN ORGANIZED HEALTH CARE EDUCATION/TRAINING PROGRAM

## 2022-09-02 PROCEDURE — 82803 BLOOD GASES ANY COMBINATION: CPT

## 2022-09-02 PROCEDURE — 63600175 PHARM REV CODE 636 W HCPCS: Performed by: NURSE ANESTHETIST, CERTIFIED REGISTERED

## 2022-09-02 PROCEDURE — 85025 COMPLETE CBC W/AUTO DIFF WBC: CPT | Performed by: STUDENT IN AN ORGANIZED HEALTH CARE EDUCATION/TRAINING PROGRAM

## 2022-09-02 PROCEDURE — 86850 RBC ANTIBODY SCREEN: CPT | Performed by: STUDENT IN AN ORGANIZED HEALTH CARE EDUCATION/TRAINING PROGRAM

## 2022-09-02 PROCEDURE — 11000001 HC ACUTE MED/SURG PRIVATE ROOM

## 2022-09-02 PROCEDURE — 59515 PRA REAN DELIVERY+POSTPARTUM CARE: ICD-10-PCS | Mod: ANES,,, | Performed by: ANESTHESIOLOGY

## 2022-09-02 PROCEDURE — 36004724 HC OB OR TIME LEV III - 1ST 15 MIN: Performed by: STUDENT IN AN ORGANIZED HEALTH CARE EDUCATION/TRAINING PROGRAM

## 2022-09-02 PROCEDURE — 59515 CESAREAN DELIVERY: CPT | Mod: ANES,,, | Performed by: ANESTHESIOLOGY

## 2022-09-02 PROCEDURE — 27000727 HC TRAY FOLEY W-METER 16-18FR

## 2022-09-02 PROCEDURE — 37000009 HC ANESTHESIA EA ADD 15 MINS: Performed by: STUDENT IN AN ORGANIZED HEALTH CARE EDUCATION/TRAINING PROGRAM

## 2022-09-02 PROCEDURE — 27000284 HC CANNULA NASAL: Performed by: ANESTHESIOLOGY

## 2022-09-02 PROCEDURE — 27201960 HC SPINAL TRAY: Performed by: ANESTHESIOLOGY

## 2022-09-02 PROCEDURE — 27000716 HC OXISENSOR PROBE, ANY SIZE: Performed by: ANESTHESIOLOGY

## 2022-09-02 PROCEDURE — 71000039 HC RECOVERY, EACH ADD'L HOUR: Performed by: STUDENT IN AN ORGANIZED HEALTH CARE EDUCATION/TRAINING PROGRAM

## 2022-09-02 RX ORDER — OXYTOCIN/RINGER'S LACTATE 30/500 ML
95 PLASTIC BAG, INJECTION (ML) INTRAVENOUS ONCE
Status: DISCONTINUED | OUTPATIENT
Start: 2022-09-02 | End: 2022-09-03

## 2022-09-02 RX ORDER — SODIUM CHLORIDE, SODIUM LACTATE, POTASSIUM CHLORIDE, CALCIUM CHLORIDE 600; 310; 30; 20 MG/100ML; MG/100ML; MG/100ML; MG/100ML
INJECTION, SOLUTION INTRAVENOUS CONTINUOUS
Status: DISCONTINUED | OUTPATIENT
Start: 2022-09-02 | End: 2022-09-03

## 2022-09-02 RX ORDER — OXYCODONE HYDROCHLORIDE 5 MG/1
10 TABLET ORAL EVERY 4 HOURS PRN
Status: DISCONTINUED | OUTPATIENT
Start: 2022-09-02 | End: 2022-09-03

## 2022-09-02 RX ORDER — MISOPROSTOL 200 UG/1
800 TABLET ORAL
Status: DISCONTINUED | OUTPATIENT
Start: 2022-09-02 | End: 2022-09-04 | Stop reason: HOSPADM

## 2022-09-02 RX ORDER — SODIUM CHLORIDE 0.9 % (FLUSH) 0.9 %
10 SYRINGE (ML) INJECTION
Status: DISCONTINUED | OUTPATIENT
Start: 2022-09-02 | End: 2022-09-04 | Stop reason: HOSPADM

## 2022-09-02 RX ORDER — SODIUM CITRATE AND CITRIC ACID MONOHYDRATE 334; 500 MG/5ML; MG/5ML
30 SOLUTION ORAL
Status: DISCONTINUED | OUTPATIENT
Start: 2022-09-02 | End: 2022-09-04 | Stop reason: HOSPADM

## 2022-09-02 RX ORDER — ADHESIVE BANDAGE
30 BANDAGE TOPICAL 2 TIMES DAILY PRN
Status: DISCONTINUED | OUTPATIENT
Start: 2022-09-03 | End: 2022-09-04 | Stop reason: HOSPADM

## 2022-09-02 RX ORDER — EPHEDRINE SULFATE 50 MG/ML
INJECTION, SOLUTION INTRAVENOUS
Status: DISCONTINUED | OUTPATIENT
Start: 2022-09-02 | End: 2022-09-02

## 2022-09-02 RX ORDER — BUPIVACAINE HYDROCHLORIDE 7.5 MG/ML
INJECTION, SOLUTION EPIDURAL; RETROBULBAR
Status: COMPLETED | OUTPATIENT
Start: 2022-09-02 | End: 2022-09-02

## 2022-09-02 RX ORDER — DIPHENHYDRAMINE HCL 25 MG
25 CAPSULE ORAL EVERY 4 HOURS PRN
Status: DISCONTINUED | OUTPATIENT
Start: 2022-09-02 | End: 2022-09-04 | Stop reason: HOSPADM

## 2022-09-02 RX ORDER — OXYTOCIN/RINGER'S LACTATE 30/500 ML
334 PLASTIC BAG, INJECTION (ML) INTRAVENOUS ONCE
Status: DISCONTINUED | OUTPATIENT
Start: 2022-09-02 | End: 2022-09-03

## 2022-09-02 RX ORDER — DOCUSATE SODIUM 100 MG/1
200 CAPSULE, LIQUID FILLED ORAL 2 TIMES DAILY
Status: DISCONTINUED | OUTPATIENT
Start: 2022-09-02 | End: 2022-09-04 | Stop reason: HOSPADM

## 2022-09-02 RX ORDER — METHYLERGONOVINE MALEATE 0.2 MG/ML
200 INJECTION INTRAVENOUS
Status: DISCONTINUED | OUTPATIENT
Start: 2022-09-02 | End: 2022-09-04 | Stop reason: HOSPADM

## 2022-09-02 RX ORDER — MUPIROCIN 20 MG/G
OINTMENT TOPICAL
Status: DISCONTINUED | OUTPATIENT
Start: 2022-09-02 | End: 2022-09-03

## 2022-09-02 RX ORDER — AMOXICILLIN 250 MG
1 CAPSULE ORAL NIGHTLY PRN
Status: DISCONTINUED | OUTPATIENT
Start: 2022-09-02 | End: 2022-09-04 | Stop reason: HOSPADM

## 2022-09-02 RX ORDER — PRENATAL WITH FERROUS FUM AND FOLIC ACID 3080; 920; 120; 400; 22; 1.84; 3; 20; 10; 1; 12; 200; 27; 25; 2 [IU]/1; [IU]/1; MG/1; [IU]/1; MG/1; MG/1; MG/1; MG/1; MG/1; MG/1; UG/1; MG/1; MG/1; MG/1; MG/1
1 TABLET ORAL DAILY
Status: DISCONTINUED | OUTPATIENT
Start: 2022-09-02 | End: 2022-09-04 | Stop reason: HOSPADM

## 2022-09-02 RX ORDER — IBUPROFEN 800 MG/1
800 TABLET ORAL EVERY 8 HOURS PRN
Status: DISCONTINUED | OUTPATIENT
Start: 2022-09-03 | End: 2022-09-03

## 2022-09-02 RX ORDER — ONDANSETRON 4 MG/1
8 TABLET, ORALLY DISINTEGRATING ORAL EVERY 8 HOURS PRN
Status: DISCONTINUED | OUTPATIENT
Start: 2022-09-02 | End: 2022-09-04 | Stop reason: HOSPADM

## 2022-09-02 RX ORDER — OXYTOCIN 10 [USP'U]/ML
INJECTION, SOLUTION INTRAMUSCULAR; INTRAVENOUS
Status: DISCONTINUED | OUTPATIENT
Start: 2022-09-02 | End: 2022-09-02

## 2022-09-02 RX ORDER — MORPHINE SULFATE 1 MG/ML
INJECTION, SOLUTION EPIDURAL; INTRATHECAL; INTRAVENOUS
Status: DISCONTINUED | OUTPATIENT
Start: 2022-09-02 | End: 2022-09-02

## 2022-09-02 RX ORDER — PROMETHAZINE HYDROCHLORIDE 25 MG/ML
INJECTION, SOLUTION INTRAMUSCULAR; INTRAVENOUS
Status: DISCONTINUED | OUTPATIENT
Start: 2022-09-02 | End: 2022-09-02

## 2022-09-02 RX ORDER — ONDANSETRON 2 MG/ML
4 INJECTION INTRAMUSCULAR; INTRAVENOUS EVERY 6 HOURS PRN
Status: ACTIVE | OUTPATIENT
Start: 2022-09-02 | End: 2022-09-03

## 2022-09-02 RX ORDER — KETOROLAC TROMETHAMINE 30 MG/ML
30 INJECTION, SOLUTION INTRAMUSCULAR; INTRAVENOUS EVERY 8 HOURS
Status: COMPLETED | OUTPATIENT
Start: 2022-09-02 | End: 2022-09-03

## 2022-09-02 RX ORDER — PROCHLORPERAZINE EDISYLATE 5 MG/ML
5 INJECTION INTRAMUSCULAR; INTRAVENOUS EVERY 6 HOURS PRN
Status: DISCONTINUED | OUTPATIENT
Start: 2022-09-02 | End: 2022-09-04 | Stop reason: HOSPADM

## 2022-09-02 RX ORDER — ACETAMINOPHEN 325 MG/1
650 TABLET ORAL EVERY 6 HOURS
Status: DISCONTINUED | OUTPATIENT
Start: 2022-09-02 | End: 2022-09-03

## 2022-09-02 RX ORDER — HYDROXYZINE PAMOATE 25 MG/1
25 CAPSULE ORAL EVERY 6 HOURS PRN
Status: DISCONTINUED | OUTPATIENT
Start: 2022-09-02 | End: 2022-09-04 | Stop reason: HOSPADM

## 2022-09-02 RX ORDER — CARBOPROST TROMETHAMINE 250 UG/ML
250 INJECTION, SOLUTION INTRAMUSCULAR
Status: DISCONTINUED | OUTPATIENT
Start: 2022-09-02 | End: 2022-09-04 | Stop reason: HOSPADM

## 2022-09-02 RX ORDER — KETOROLAC TROMETHAMINE 30 MG/ML
INJECTION, SOLUTION INTRAMUSCULAR; INTRAVENOUS
Status: DISCONTINUED | OUTPATIENT
Start: 2022-09-02 | End: 2022-09-02

## 2022-09-02 RX ORDER — CEFAZOLIN SODIUM 2 G/50ML
2 SOLUTION INTRAVENOUS
Status: COMPLETED | OUTPATIENT
Start: 2022-09-02 | End: 2022-09-02

## 2022-09-02 RX ORDER — BISACODYL 10 MG
10 SUPPOSITORY, RECTAL RECTAL ONCE AS NEEDED
Status: DISCONTINUED | OUTPATIENT
Start: 2022-09-02 | End: 2022-09-04 | Stop reason: HOSPADM

## 2022-09-02 RX ORDER — ONDANSETRON 2 MG/ML
INJECTION INTRAMUSCULAR; INTRAVENOUS
Status: DISCONTINUED | OUTPATIENT
Start: 2022-09-02 | End: 2022-09-02

## 2022-09-02 RX ORDER — FAMOTIDINE 10 MG/ML
20 INJECTION INTRAVENOUS
Status: DISCONTINUED | OUTPATIENT
Start: 2022-09-02 | End: 2022-09-04 | Stop reason: HOSPADM

## 2022-09-02 RX ORDER — KETOROLAC TROMETHAMINE 30 MG/ML
30 INJECTION, SOLUTION INTRAMUSCULAR; INTRAVENOUS EVERY 6 HOURS
Status: CANCELLED | OUTPATIENT
Start: 2022-09-02 | End: 2022-09-03

## 2022-09-02 RX ORDER — CEFAZOLIN SODIUM 2 G/50ML
2 SOLUTION INTRAVENOUS
Status: DISCONTINUED | OUTPATIENT
Start: 2022-09-02 | End: 2022-09-03

## 2022-09-02 RX ORDER — OXYCODONE HYDROCHLORIDE 5 MG/1
5 TABLET ORAL EVERY 4 HOURS PRN
Status: DISCONTINUED | OUTPATIENT
Start: 2022-09-02 | End: 2022-09-03

## 2022-09-02 RX ORDER — SIMETHICONE 80 MG
1 TABLET,CHEWABLE ORAL EVERY 6 HOURS PRN
Status: DISCONTINUED | OUTPATIENT
Start: 2022-09-02 | End: 2022-09-04 | Stop reason: HOSPADM

## 2022-09-02 RX ADMIN — SODIUM CITRATE AND CITRIC ACID MONOHYDRATE 30 ML: 500; 334 SOLUTION ORAL at 07:09

## 2022-09-02 RX ADMIN — SODIUM CHLORIDE, POTASSIUM CHLORIDE, SODIUM LACTATE AND CALCIUM CHLORIDE 1000 ML: 600; 310; 30; 20 INJECTION, SOLUTION INTRAVENOUS at 05:09

## 2022-09-02 RX ADMIN — CEFAZOLIN SODIUM 2 G: 10 INJECTION, POWDER, FOR SOLUTION INTRAVENOUS at 05:09

## 2022-09-02 RX ADMIN — SODIUM CHLORIDE, POTASSIUM CHLORIDE, SODIUM LACTATE AND CALCIUM CHLORIDE: 600; 310; 30; 20 INJECTION, SOLUTION INTRAVENOUS at 11:09

## 2022-09-02 RX ADMIN — MORPHINE SULFATE 9.75 MG: 1 INJECTION, SOLUTION EPIDURAL; INTRATHECAL; INTRAVENOUS at 10:09

## 2022-09-02 RX ADMIN — HYDROXYZINE PAMOATE 25 MG: 25 CAPSULE ORAL at 08:09

## 2022-09-02 RX ADMIN — OXYTOCIN 30 UNITS: 10 INJECTION, SOLUTION INTRAMUSCULAR; INTRAVENOUS at 09:09

## 2022-09-02 RX ADMIN — SODIUM CHLORIDE, POTASSIUM CHLORIDE, SODIUM LACTATE AND CALCIUM CHLORIDE: 600; 310; 30; 20 INJECTION, SOLUTION INTRAVENOUS at 08:09

## 2022-09-02 RX ADMIN — MORPHINE SULFATE 0.25 MG: 1 INJECTION, SOLUTION EPIDURAL; INTRATHECAL; INTRAVENOUS at 09:09

## 2022-09-02 RX ADMIN — EPHEDRINE SULFATE 25 MG: 50 INJECTION INTRAVENOUS at 09:09

## 2022-09-02 RX ADMIN — SODIUM CHLORIDE, POTASSIUM CHLORIDE, SODIUM LACTATE AND CALCIUM CHLORIDE: 600; 310; 30; 20 INJECTION, SOLUTION INTRAVENOUS at 01:09

## 2022-09-02 RX ADMIN — DIPHENHYDRAMINE HYDROCHLORIDE 25 MG: 25 CAPSULE ORAL at 02:09

## 2022-09-02 RX ADMIN — ONDANSETRON 4 MG: 2 INJECTION INTRAMUSCULAR; INTRAVENOUS at 09:09

## 2022-09-02 RX ADMIN — FAMOTIDINE 20 MG: 10 INJECTION, SOLUTION INTRAVENOUS at 07:09

## 2022-09-02 RX ADMIN — OXYCODONE HYDROCHLORIDE 5 MG: 5 TABLET ORAL at 07:09

## 2022-09-02 RX ADMIN — BUPIVACAINE HYDROCHLORIDE 2 ML: 7.5 INJECTION, SOLUTION EPIDURAL; RETROBULBAR at 09:09

## 2022-09-02 RX ADMIN — KETOROLAC TROMETHAMINE 30 MG: 30 INJECTION, SOLUTION INTRAMUSCULAR; INTRAVENOUS at 09:09

## 2022-09-02 RX ADMIN — PROMETHAZINE HYDROCHLORIDE 12.5 MG: 25 INJECTION INTRAMUSCULAR; INTRAVENOUS at 10:09

## 2022-09-02 RX ADMIN — DOCUSATE SODIUM 200 MG: 100 CAPSULE, LIQUID FILLED ORAL at 09:09

## 2022-09-02 RX ADMIN — KETOROLAC TROMETHAMINE 30 MG: 30 INJECTION, SOLUTION INTRAMUSCULAR; INTRAVENOUS at 01:09

## 2022-09-02 RX ADMIN — KETOROLAC TROMETHAMINE 60 MG: 30 INJECTION, SOLUTION INTRAMUSCULAR at 10:09

## 2022-09-02 RX ADMIN — ACETAMINOPHEN 650 MG: 325 TABLET ORAL at 05:09

## 2022-09-02 RX ADMIN — CEFAZOLIN 2 G: 10 INJECTION, POWDER, FOR SOLUTION INTRAVENOUS; PARENTERAL at 09:09

## 2022-09-02 NOTE — ANESTHESIA PREPROCEDURE EVALUATION
2022  Stephanie Cisse is a 22 y.o., female.      Pre-op Assessment    I have reviewed the Patient Summary Reports.    I have reviewed the NPO Status.   I have reviewed the Medications.     Review of Systems         Anesthesia Plan  Type of Anesthesia, risks & benefits discussed:    Anesthesia Type: Spinal  Intra-op Monitoring Plan: Standard ASA Monitors  Post Op Pain Control Plan: multimodal analgesia  Informed Consent: Informed consent signed with the Patient and all parties understand the risks and agree with anesthesia plan.  All questions answered.   ASA Score: 2    Ready For Surgery From Anesthesia Perspective.     .  NPO greater than 8 hours  NAC  NKDA    Hct 32  Platelets 179     at 39 weeks  H/o hypoglycemia    Airway exam deferred (COVID precautions); adequate ROM at neck.    Plan is SAB

## 2022-09-02 NOTE — TRANSFER OF CARE
"Anesthesia Transfer of Care Note    Patient: Stephanie Cisse    Procedure(s) Performed: Procedure(s) (LRB):   SECTION (N/A)    Patient location: Labor and Delivery    Anesthesia Type: spinal    Transport from OR: Transported from OR on room air with adequate spontaneous ventilation    Post pain: adequate analgesia    Post assessment: no apparent anesthetic complications    Post vital signs: stable    Level of consciousness: responds to stimulation    Nausea/Vomiting: no nausea/vomiting    Complications: none    Transfer of care protocol was followed      Last vitals:   Visit Vitals  /86 (BP Location: Right arm, Patient Position: Lying)   Pulse 78   Temp 36.1 °C (97 °F)   Resp (!) 22   Ht 5' 7" (1.702 m)   Wt 88.3 kg (194 lb 9.6 oz)   LMP 2021 (Exact Date)   SpO2 100%   Breastfeeding No   BMI 30.48 kg/m²     "
detailed exam

## 2022-09-02 NOTE — PLAN OF CARE
Pt continuing with plan of care.      Problem: Adult Inpatient Plan of Care  Goal: Plan of Care Review  Outcome: Ongoing, Progressing  Goal: Patient-Specific Goal (Individualized)  Outcome: Ongoing, Progressing  Goal: Absence of Hospital-Acquired Illness or Injury  Outcome: Ongoing, Progressing  Goal: Optimal Comfort and Wellbeing  Outcome: Ongoing, Progressing  Goal: Readiness for Transition of Care  Outcome: Ongoing, Progressing     Problem:  Fall Injury Risk  Goal: Absence of Fall, Infant Drop and Related Injury  Outcome: Ongoing, Progressing     Problem: Infection  Goal: Absence of Infection Signs and Symptoms  Outcome: Ongoing, Progressing     Problem: Adjustment to Role Transition (Postpartum  Delivery)  Goal: Successful Maternal Role Transition  Outcome: Ongoing, Progressing     Problem: Bleeding (Postpartum  Delivery)  Goal: Hemostasis  Outcome: Ongoing, Progressing     Problem: Infection (Postpartum  Delivery)  Goal: Absence of Infection Signs and Symptoms  Outcome: Ongoing, Progressing     Problem: Pain (Postpartum  Delivery)  Goal: Acceptable Pain Control  Outcome: Ongoing, Progressing     Problem: Postoperative Nausea and Vomiting (Postpartum  Delivery)  Goal: Nausea and Vomiting Relief  Outcome: Ongoing, Progressing     Problem: Postoperative Urinary Retention (Postpartum  Delivery)  Goal: Effective Urinary Elimination  Outcome: Ongoing, Progressing

## 2022-09-02 NOTE — H&P
[facility] -  Labor and Delivery  Obstetrics  History & Physical    Patient Name: Stephanie Cisse  MRN: [id number]  Admission Date: 2022  Primary Care Provider: Primary Doctor No    Subjective:     Principal Problem:Breech presentation    History of Present Illness:  22 y.o.  at 39w0d presents to L&D for scheduled PLTCS due to breech presentation. She denies vaginal bleeding, LOF, ctx. Reports good fetal movement. Prenatal care with Dr. Tavera. Pregnancy complicated by headaches, breech presentation. O pos, GBS neg, rubella imm.        Obstetric HPI:  Patient reports no contractions, active fetal movement, No vaginal bleeding , No loss of fluid       OB History    Para Term  AB Living   1 0 0 0 0 0   SAB IAB Ectopic Multiple Live Births   0 0 0 0 0      # Outcome Date GA Lbr Misael/2nd Weight Sex Delivery Anes PTL Lv   1 Current              Past Medical History:   Diagnosis Date    34 weeks gestation of pregnancy 2022    Estimated Date of Delivery: 22 established by LMP, 6w6d US consistent  First Trimester - Prenatal Battery: WNL - UDS: neg - Pap Smear: NILM - GC/C: neg  Second Trimester - Anatomy Ultrasound: no abnormalities  Third Trimester - 28 wk labs:  - Rh Status: O pos - 1Hr GCT:  - Fetal Kick Count: - GC/C:  - GBS:   Vaccines - Influenza: discussed - TDAP: Plan to give around 32wks - COVID: discussed      Acne     Hypoglycemia      History reviewed. No pertinent surgical history.    PTA Medications   Medication Sig    butalbital-acetaminophen-caffeine -40 mg (FIORICET, ESGIC) -40 mg per tablet Take 1 tablet by mouth every 4 (four) hours as needed for Pain or Headaches. (Patient not taking: Reported on 2022)    pediatric multivit-iron-min (FLINTSTONES COMPLETE, IRON,) Chew Take 1 tablet by mouth once daily.       Review of patient's allergies indicates:  No Known Allergies     Family History       Problem Relation (Age of Onset)    Clotting  disorder Mother, Sister    Heart disease Mother    No Known Problems Father, Brother, Sister, Brother, Sister    Protein S deficiency Mother    Stroke Mother          Tobacco Use    Smoking status: Never    Smokeless tobacco: Never   Substance and Sexual Activity    Alcohol use: Never    Drug use: Not Currently    Sexual activity: Yes     Partners: Male     Birth control/protection: None     Review of Systems   All other systems reviewed and are negative.   Objective:     Vital Signs (Most Recent):  Temp: 97.8 °F (36.6 °C) (09/02/22 0706)  Pulse: 81 (09/02/22 0706)  Resp: 18 (09/02/22 0706)  BP: (!) 136/90 (09/02/22 0706)  SpO2: 97 % (09/02/22 0530)   Vital Signs (24h Range):  Temp:  [97.2 °F (36.2 °C)-97.8 °F (36.6 °C)] 97.8 °F (36.6 °C)  Pulse:  [81-83] 81  Resp:  [18-20] 18  SpO2:  [97 %] 97 %  BP: (136-137)/(84-90) 136/90     Weight: 88.3 kg (194 lb 9.6 oz)  Body mass index is 30.48 kg/m².    FHT: 145 Cat 1 (reassuring)  TOCO:  occasional    Physical Exam:   Constitutional: She is oriented to person, place, and time. She appears well-developed and well-nourished.    HENT:   Head: Normocephalic and atraumatic.      Cardiovascular:  Normal rate.             Pulmonary/Chest: Effort normal.        Abdominal: Soft. There is no abdominal tenderness.     Genitourinary:    Uterus normal.             Musculoskeletal: Normal range of motion.       Neurological: She is alert and oriented to person, place, and time.    Skin: Skin is warm and dry.    Psychiatric: She has a normal mood and affect. Her behavior is normal. Judgment and thought content normal.     Cervix: deferred      Significant Labs:  Lab Results   Component Value Date    GROUPTRH O POS 09/02/2022    HEPBSAG Non-Reactive 01/19/2022       CBC:   Recent Labs   Lab 09/02/22  0607   WBC 11.04*   RBC 3.55*   HGB 10.7*   HCT 31.5*      MCV 88.7   MCH 30.1   MCHC 34.0     I have personallly reviewed all pertinent lab results from the last 24  hours.  Recent Lab Results         22  0722   22  0607        Baso #   0.02       Basophil %   0.2       COVID-19 Ag Negative         Differential Type   Auto       Eos #   0.06       Eosinophil %   0.5       Group & Rh   O POS       Hematocrit   31.5       Hemoglobin   10.7       Immature Grans (Abs)   0.10       Immature Granulocytes   0.9       INDIRECT GIOVANY   NEG       Lymph #   1.57       Lymph %   14.2       MCH   30.1       MCHC   34.0       MCV   88.7       Mono #   0.94       Mono %   8.5       MPV   10.9       Neutrophils, Abs   8.35       Neutrophils Relative   75.7       nRBC   0.0       NUCLEATED RBC ABSOLUTE   0.00       Platelets   179       RBC   3.55       RDW   12.5       WBC   11.04             Assessment/Plan:     22 y.o. female  at 39w0d for:    * Breech presentation  Scheduled for primary c/s  Anterior placenta  Admission labs pending  Ancef 2g    39 weeks gestation of pregnancy           Boby Tavera, DO  Obstetrics  [facility] -  Labor and Delivery

## 2022-09-02 NOTE — HPI
22 y.o.  at 39w0d presents to L&D for scheduled PLTCS due to breech presentation. She denies vaginal bleeding, LOF, ctx. Reports good fetal movement. Prenatal care with Dr. Tavera. Pregnancy complicated by headaches, breech presentation. O pos, GBS neg, rubella imm.

## 2022-09-02 NOTE — L&D DELIVERY NOTE
[facility] -  Labor and Delivery   Section   Operative Note    SUMMARY     Date of Procedure: 2022     Procedure: Procedure(s) (LRB):   SECTION (N/A)    Surgeon(s) and Role:     * Boby Tavera DO - Primary    Assisting Surgeon: Oz Helton MD ([facility])    Pre-Operative Diagnosis: Breech presentation, single or unspecified fetus [O32.1XX0]    Post-Operative Diagnosis: Post-Op Diagnosis Codes:     * Breech presentation, single or unspecified fetus [O32.1XX0]    Anesthesia: Spinal/Epidural    Technical Procedures Used: PLTCS           Description of the Findings of the Procedure: viable male fetus in breech presentation, normal appearing uterus    Significant Surgical Tasks Conducted by the Assistant(s), if Applicable: closure of peritoneum, fascia, subcutaneous and skin    Complications: No    Blood Loss: 800     Patient was taken to OR with IVF running. Spinal anesthesia was placed without difficulty.  With patient in supine position, the legs are  and Reynoso Catheter placed and positioning to supine done.   Abdomen prepped with Chloroprep and 3 minute drying time allowed prior to draping of the abdomen.   Time out taken with OR team members.    Pfannenstiel Incision made through the skin, transverse fascial incision developed, rectus muscles  in the midline and the peritoneum entered.   no adhesions noted.    Collin retractor was inserted into the abdominal cavity.  The lower uterine segment and position of the fetus identified.   Bladder flap taken down through transverse peritoneal incision.    Low Transverse Incision made through well developed lower uterine segment and extended laterally with blunt dissection.   Clear fluid noted.  Infant delivered from frankbreech presentation.  Cord clamped after one minute and  handed to attending nurse.  Cord blood taken, placenta delivered.  The uterus wasnot exteriorized and cleared of all clot and  "debris.  The edges of the uterine incision are grasped with ring clamps at the angles and the inferior midline edge of the incision.    Closure with running lock 0 vicryl, starting at left angle, tying at right angle.  A second imbricating layer was placed with 0 vicryl.   Observation for bleeding with suture of any bleeding along the hysterotomy line.   With good hemostasis noted, the anterior pelvis is rinsed with sterile saline.      Closure of the abdomen with 3-0 monocryl running of the peritoneum.  Rectus muscle was reapproximated with 0 chromic.  Fascia was closed with 0 looped PDS starting at the left angle and tying the knot at the right angle.  Subcutaneous tissue was reapproximated with 2-0 plain gut.  Skin closure with 4 0 monocryl subcuticular.  Wound dressed with glue.          Specimens:   Specimen (24h ago, onward)      None            Condition: Good    Disposition: PACU - hemodynamically stable.    Attestation: Good         Delivery Information for Roscoe Cisse    Birth information:  YOB: [birth year]22   Time of birth: 9:54 AM   Sex: male   Head Delivery Date/Time: 2022  9:54 AM   Delivery type: , Low Transverse   Gestational Age: 39w0d    Delivery Providers    Delivering clinician: Boby Tavera DO           Measurements    Weight: 3881 g  Weight (lbs): 8 lb 8.9 oz  Length: 53.3 cm  Length (in): 21"         Apgars    Living status: Living  Apgars:  1 min.:  5 min.:  10 min.:  15 min.:  20 min.:    Skin color:  1  1       Heart rate:  2  2       Reflex irritability:  2  2       Muscle tone:  2  2       Respiratory effort:  2  2       Total:  9  9       Apgars assigned by: DENNYS PAULA RN         Operative Delivery    Forceps attempted?: No  Vacuum extractor attempted?: No         Shoulder Dystocia    Shoulder dystocia present?: No           Presentation    Presentation: Jatin Breech           Interventions/Resuscitation    Method: Bulb Suctioning, Tactile Stimulation   "     Cord    Vessels: 3 vessels  Complications: Nuchal  Nuchal Intervention: reduced  Nuchal Cord Description: loose nuchal cord  Number of Loops: 2  Delayed Cord Clamping?: No  Cord Blood Disposition: Sent with Baby  Gases Sent?: Yes  Stem Cell Collection (by MD): No       Placenta    Placenta delivery date/time: 2022 0956  Placenta removal: Manual removal  Placenta appearance: Intact  Placenta disposition: discarded           Labor Events:       labor:       Labor Onset Date/Time:         Dilation Complete Date/Time:         Start Pushing Date/Time:       Rupture Date/Time:            Rupture type:            Fluid Amount:         Fluid Color:          steroids:       Antibiotics given for GBS:       Induction:       Indications for induction:        Augmentation:       Indications for augmentation:       Labor complications: None     Additional complications:          Cervical ripening:                     Delivery:      Episiotomy:       Indication for Episiotomy:       Perineal Lacerations:   Repaired:      Periurethral Laceration:   Repaired:     Labial Laceration:   Repaired:     Sulcus Laceration:   Repaired:     Vaginal Laceration:   Repaired:     Cervical Laceration:   Repaired:     Repair suture:       Repair # of packets:       Last Value - EBL - Nursing (mL):       Sum - EBL - Nursing (mL): 0     Last Value - EBL - Anesthesia (mL):        Calculated QBL (mL):         Vaginal Sweep Performed:       Surgicount Correct:         Other providers:       Anesthesia    Method: Spinal          Details (if applicable):  Trial of Labor No    Categorization: Primary    Priority: Routine   Indications for : Breech   Incision Type: low transverse     Additional  information:  Forceps:    Vacuum:    Breech:    Observed anomalies    Other (Comments):

## 2022-09-02 NOTE — ANESTHESIA PROCEDURE NOTES
Spinal    Diagnosis: IUP  Patient location during procedure: OR    Staffing  Authorizing Provider: Nemesio Edmondson MD  Performing Provider: Nemesio Edmondson MD    Preanesthetic Checklist  Completed: patient identified, risks and benefits discussed, pre-op evaluation and timeout performed  Spinal Block  Patient position: sitting  Prep: Betadine  Approach: midline  Location: L3-4  Injection technique: single shot  CSF Fluid: clear free-flowing CSF  Needle  Additional Documentation: negative aspiration for heme  Needle localization: anatomical landmarks  Assessment  Ease of block: difficult  Additional Notes  Duramorph 0.25 mg added to SAB.   No complications.  Medications:    Medications: BUPivacaine (pf) (MARCAINE) injection 0.75% - Intraspinal   2 mL - 9/2/2022 9:39:00 AM

## 2022-09-02 NOTE — SUBJECTIVE & OBJECTIVE
Obstetric HPI:  Patient reports no contractions, active fetal movement, No vaginal bleeding , No loss of fluid       OB History    Para Term  AB Living   1 0 0 0 0 0   SAB IAB Ectopic Multiple Live Births   0 0 0 0 0      # Outcome Date GA Lbr Misael/2nd Weight Sex Delivery Anes PTL Lv   1 Current              Past Medical History:   Diagnosis Date    34 weeks gestation of pregnancy 2022    Estimated Date of Delivery: 22 established by LMP, 6w6d US consistent  First Trimester - Prenatal Battery: WNL - UDS: neg - Pap Smear: NILM - GC/C: neg  Second Trimester - Anatomy Ultrasound: no abnormalities  Third Trimester - 28 wk labs:  - Rh Status: O pos - 1Hr GCT:  - Fetal Kick Count: - GC/C:  - GBS:   Vaccines - Influenza: discussed - TDAP: Plan to give around 32wks - COVID: discussed      Acne     Hypoglycemia      History reviewed. No pertinent surgical history.    PTA Medications   Medication Sig    butalbital-acetaminophen-caffeine -40 mg (FIORICET, ESGIC) -40 mg per tablet Take 1 tablet by mouth every 4 (four) hours as needed for Pain or Headaches. (Patient not taking: Reported on 2022)    pediatric multivit-iron-min (FLINTSTONES COMPLETE, IRON,) Chew Take 1 tablet by mouth once daily.       Review of patient's allergies indicates:  No Known Allergies     Family History       Problem Relation (Age of Onset)    Clotting disorder Mother, Sister    Heart disease Mother    No Known Problems Father, Brother, Sister, Brother, Sister    Protein S deficiency Mother    Stroke Mother          Tobacco Use    Smoking status: Never    Smokeless tobacco: Never   Substance and Sexual Activity    Alcohol use: Never    Drug use: Not Currently    Sexual activity: Yes     Partners: Male     Birth control/protection: None     Review of Systems   All other systems reviewed and are negative.   Objective:     Vital Signs (Most Recent):  Temp: 97.8 °F (36.6 °C) (22 0706)  Pulse: 81 (22  0706)  Resp: 18 (09/02/22 0706)  BP: (!) 136/90 (09/02/22 0706)  SpO2: 97 % (09/02/22 0530)   Vital Signs (24h Range):  Temp:  [97.2 °F (36.2 °C)-97.8 °F (36.6 °C)] 97.8 °F (36.6 °C)  Pulse:  [81-83] 81  Resp:  [18-20] 18  SpO2:  [97 %] 97 %  BP: (136-137)/(84-90) 136/90     Weight: 88.3 kg (194 lb 9.6 oz)  Body mass index is 30.48 kg/m².    FHT: 145 Cat 1 (reassuring)  TOCO:  occasional    Physical Exam:   Constitutional: She is oriented to person, place, and time. She appears well-developed and well-nourished.    HENT:   Head: Normocephalic and atraumatic.      Cardiovascular:  Normal rate.             Pulmonary/Chest: Effort normal.        Abdominal: Soft. There is no abdominal tenderness.     Genitourinary:    Uterus normal.             Musculoskeletal: Normal range of motion.       Neurological: She is alert and oriented to person, place, and time.    Skin: Skin is warm and dry.    Psychiatric: She has a normal mood and affect. Her behavior is normal. Judgment and thought content normal.     Cervix: deferred      Significant Labs:  Lab Results   Component Value Date    GROUPTRH O POS 09/02/2022    HEPBSAG Non-Reactive 01/19/2022       CBC:   Recent Labs   Lab 09/02/22  0607   WBC 11.04*   RBC 3.55*   HGB 10.7*   HCT 31.5*      MCV 88.7   MCH 30.1   MCHC 34.0     I have personallly reviewed all pertinent lab results from the last 24 hours.  Recent Lab Results         09/02/22  0722   09/02/22  0607        Baso #   0.02       Basophil %   0.2       COVID-19 Ag Negative         Differential Type   Auto       Eos #   0.06       Eosinophil %   0.5       Group & Rh   O POS       Hematocrit   31.5       Hemoglobin   10.7       Immature Grans (Abs)   0.10       Immature Granulocytes   0.9       INDIRECT GIOVANY   NEG       Lymph #   1.57       Lymph %   14.2       MCH   30.1       MCHC   34.0       MCV   88.7       Mono #   0.94       Mono %   8.5       MPV   10.9       Neutrophils, Abs   8.35       Neutrophils  Relative   75.7       nRBC   0.0       NUCLEATED RBC ABSOLUTE   0.00       Platelets   179       RBC   3.55       RDW   12.5       WBC   11.04

## 2022-09-03 LAB
BASOPHILS # BLD AUTO: 0.02 K/UL (ref 0–0.2)
BASOPHILS NFR BLD AUTO: 0.2 % (ref 0–1)
DIFFERENTIAL METHOD BLD: ABNORMAL
EOSINOPHIL # BLD AUTO: 0.06 K/UL (ref 0–0.5)
EOSINOPHIL NFR BLD AUTO: 0.5 % (ref 1–4)
ERYTHROCYTE [DISTWIDTH] IN BLOOD BY AUTOMATED COUNT: 12.3 % (ref 11.5–14.5)
HCT VFR BLD AUTO: 25 % (ref 38–47)
HGB BLD-MCNC: 8.2 G/DL (ref 12–16)
IMM GRANULOCYTES # BLD AUTO: 0.06 K/UL (ref 0–0.04)
IMM GRANULOCYTES NFR BLD: 0.5 % (ref 0–0.4)
LYMPHOCYTES # BLD AUTO: 1.94 K/UL (ref 1–4.8)
LYMPHOCYTES NFR BLD AUTO: 16.9 % (ref 27–41)
MCH RBC QN AUTO: 29.9 PG (ref 27–31)
MCHC RBC AUTO-ENTMCNC: 32.8 G/DL (ref 32–36)
MCV RBC AUTO: 91.2 FL (ref 80–96)
MONOCYTES # BLD AUTO: 1.08 K/UL (ref 0–0.8)
MONOCYTES NFR BLD AUTO: 9.4 % (ref 2–6)
MPC BLD CALC-MCNC: 11.1 FL (ref 9.4–12.4)
NEUTROPHILS # BLD AUTO: 8.35 K/UL (ref 1.8–7.7)
NEUTROPHILS NFR BLD AUTO: 72.5 % (ref 53–65)
NRBC # BLD AUTO: 0 X10E3/UL
NRBC, AUTO (.00): 0 %
PLATELET # BLD AUTO: 141 K/UL (ref 150–400)
RBC # BLD AUTO: 2.74 M/UL (ref 4.2–5.4)
WBC # BLD AUTO: 11.51 K/UL (ref 4.5–11)

## 2022-09-03 PROCEDURE — 36415 COLL VENOUS BLD VENIPUNCTURE: CPT | Performed by: STUDENT IN AN ORGANIZED HEALTH CARE EDUCATION/TRAINING PROGRAM

## 2022-09-03 PROCEDURE — 85025 COMPLETE CBC W/AUTO DIFF WBC: CPT | Performed by: STUDENT IN AN ORGANIZED HEALTH CARE EDUCATION/TRAINING PROGRAM

## 2022-09-03 PROCEDURE — 59510 PR FULL ROUT OBSTE CARE,CESAREAN DELIV: ICD-10-PCS | Mod: ,,, | Performed by: STUDENT IN AN ORGANIZED HEALTH CARE EDUCATION/TRAINING PROGRAM

## 2022-09-03 PROCEDURE — 25000003 PHARM REV CODE 250: Performed by: STUDENT IN AN ORGANIZED HEALTH CARE EDUCATION/TRAINING PROGRAM

## 2022-09-03 PROCEDURE — 11000001 HC ACUTE MED/SURG PRIVATE ROOM

## 2022-09-03 PROCEDURE — 63600175 PHARM REV CODE 636 W HCPCS: Performed by: STUDENT IN AN ORGANIZED HEALTH CARE EDUCATION/TRAINING PROGRAM

## 2022-09-03 PROCEDURE — 59510 CESAREAN DELIVERY: CPT | Mod: ,,, | Performed by: STUDENT IN AN ORGANIZED HEALTH CARE EDUCATION/TRAINING PROGRAM

## 2022-09-03 RX ORDER — IBUPROFEN 800 MG/1
800 TABLET ORAL EVERY 8 HOURS PRN
Status: DISCONTINUED | OUTPATIENT
Start: 2022-09-03 | End: 2022-09-04 | Stop reason: HOSPADM

## 2022-09-03 RX ORDER — ADHESIVE BANDAGE
30 BANDAGE TOPICAL DAILY
Status: DISCONTINUED | OUTPATIENT
Start: 2022-09-03 | End: 2022-09-04 | Stop reason: HOSPADM

## 2022-09-03 RX ORDER — OXYCODONE AND ACETAMINOPHEN 10; 325 MG/1; MG/1
1 TABLET ORAL EVERY 4 HOURS PRN
Status: DISCONTINUED | OUTPATIENT
Start: 2022-09-03 | End: 2022-09-04

## 2022-09-03 RX ORDER — OXYCODONE AND ACETAMINOPHEN 5; 325 MG/1; MG/1
1 TABLET ORAL EVERY 4 HOURS PRN
Status: DISCONTINUED | OUTPATIENT
Start: 2022-09-03 | End: 2022-09-04

## 2022-09-03 RX ADMIN — DOCUSATE SODIUM 200 MG: 100 CAPSULE, LIQUID FILLED ORAL at 09:09

## 2022-09-03 RX ADMIN — CEFAZOLIN SODIUM 2 G: 10 INJECTION, POWDER, FOR SOLUTION INTRAVENOUS at 01:09

## 2022-09-03 RX ADMIN — KETOROLAC TROMETHAMINE 30 MG: 30 INJECTION, SOLUTION INTRAMUSCULAR; INTRAVENOUS at 05:09

## 2022-09-03 RX ADMIN — Medication 1 TABLET: at 09:09

## 2022-09-03 RX ADMIN — OXYCODONE HYDROCHLORIDE AND ACETAMINOPHEN 1 TABLET: 10; 325 TABLET ORAL at 05:09

## 2022-09-03 RX ADMIN — OXYCODONE HYDROCHLORIDE AND ACETAMINOPHEN 1 TABLET: 10; 325 TABLET ORAL at 11:09

## 2022-09-03 RX ADMIN — IBUPROFEN 800 MG: 800 TABLET ORAL at 01:09

## 2022-09-03 RX ADMIN — OXYCODONE HYDROCHLORIDE AND ACETAMINOPHEN 1 TABLET: 10; 325 TABLET ORAL at 01:09

## 2022-09-03 RX ADMIN — OXYCODONE HYDROCHLORIDE AND ACETAMINOPHEN 1 TABLET: 10; 325 TABLET ORAL at 08:09

## 2022-09-03 RX ADMIN — IBUPROFEN 800 MG: 800 TABLET ORAL at 08:09

## 2022-09-03 RX ADMIN — MAGNESIUM HYDROXIDE 2400 MG: 400 SUSPENSION ORAL at 05:09

## 2022-09-03 RX ADMIN — Medication: at 05:09

## 2022-09-03 NOTE — PLAN OF CARE
Problem: Adult Inpatient Plan of Care  Goal: Plan of Care Review  Outcome: Ongoing, Progressing  Goal: Patient-Specific Goal (Individualized)  Outcome: Ongoing, Progressing  Goal: Absence of Hospital-Acquired Illness or Injury  Outcome: Ongoing, Progressing  Goal: Optimal Comfort and Wellbeing  Outcome: Ongoing, Progressing  Goal: Readiness for Transition of Care  Outcome: Ongoing, Progressing     Problem:  Fall Injury Risk  Goal: Absence of Fall, Infant Drop and Related Injury  Outcome: Ongoing, Progressing     Problem: Infection  Goal: Absence of Infection Signs and Symptoms  Outcome: Ongoing, Progressing     Problem: Adjustment to Role Transition (Postpartum  Delivery)  Goal: Successful Maternal Role Transition  Outcome: Ongoing, Progressing     Problem: Bleeding (Postpartum  Delivery)  Goal: Hemostasis  Outcome: Ongoing, Progressing     Problem: Infection (Postpartum  Delivery)  Goal: Absence of Infection Signs and Symptoms  Outcome: Ongoing, Progressing     Problem: Postoperative Nausea and Vomiting (Postpartum  Delivery)  Goal: Nausea and Vomiting Relief  Outcome: Ongoing, Progressing     Problem: Postoperative Urinary Retention (Postpartum  Delivery)  Goal: Effective Urinary Elimination  Outcome: Ongoing, Progressing

## 2022-09-03 NOTE — PROGRESS NOTES
[facility] -  Labor and Delivery  Obstetrics  Postpartum Progress Note    Patient Name: Stephanie Cisse  MRN: [id number]  Admission Date: 9/2/2022  Hospital Length of Stay: 1 days  Attending Physician: Boby Tavera, *  Primary Care Provider: Primary Doctor No    Subjective:     Principal Problem:Breech presentation    Hospital Course:  No notes on file    Interval History: POD1 s/p PLTCS    She is doing well this morning. She is tolerating a regular diet without nausea or vomiting. She is voiding spontaneously. She is ambulating. She has passed flatus, and has not a BM. Vaginal bleeding is mild. She denies fever or chills. Abdominal pain is mild and controlled with oral medications. She Is breastfeeding. She desires circumcision for her male baby: yes.    Objective:     Vital Signs (Most Recent):  Temp: 97.2 °F (36.2 °C) (09/03/22 0500)  Pulse: 69 (09/03/22 1138)  Resp: 18 (09/03/22 1141)  BP: 125/77 (09/03/22 1138)  SpO2: 99 % (09/02/22 1917) Vital Signs (24h Range):  Temp:  [97.2 °F (36.2 °C)-97.7 °F (36.5 °C)] 97.2 °F (36.2 °C)  Pulse:  [] 69  Resp:  [18] 18  SpO2:  [86 %-99 %] 99 %  BP: (118-182)/(61-81) 125/77     Weight: 88.3 kg (194 lb 9.6 oz)  Body mass index is 30.48 kg/m².      Intake/Output Summary (Last 24 hours) at 9/3/2022 1319  Last data filed at 9/3/2022 0845  Gross per 24 hour   Intake --   Output 1600 ml   Net -1600 ml         Significant Labs:  Lab Results   Component Value Date    GROUPTRH O POS 09/02/2022    HEPBSAG Non-Reactive 01/19/2022     Recent Labs   Lab 09/03/22  0534   HGB 8.2*   HCT 25.0*       CBC:   Recent Labs   Lab 09/03/22  0534   WBC 11.51*   RBC 2.74*   HGB 8.2*   HCT 25.0*   *   MCV 91.2   MCH 29.9   MCHC 32.8     I have personallly reviewed all pertinent lab results from the last 24 hours.  Recent Lab Results         09/03/22  0534        Baso # 0.02       Basophil % 0.2       Differential Type Auto       Eos # 0.06       Eosinophil % 0.5        Hematocrit 25.0       Hemoglobin 8.2       Immature Grans (Abs) 0.06       Immature Granulocytes 0.5       Lymph # 1.94       Lymph % 16.9       MCH 29.9       MCHC 32.8       MCV 91.2       Mono # 1.08       Mono % 9.4       MPV 11.1       Neutrophils, Abs 8.35       Neutrophils Relative 72.5       nRBC 0.0       NUCLEATED RBC ABSOLUTE 0.00       Platelets 141       RBC 2.74       RDW 12.3       WBC 11.51               Physical Exam:   Constitutional: She is oriented to person, place, and time. She appears well-developed and well-nourished.    HENT:   Head: Normocephalic and atraumatic.    Eyes: Pupils are equal, round, and reactive to light. EOM are normal.     Cardiovascular:  Normal rate.      Exam reveals no clubbing, no cyanosis and no edema.        Pulmonary/Chest: Effort normal.        Abdominal: Soft. She exhibits abdominal incision. She exhibits no distension. There is abdominal tenderness.     Genitourinary:    Uterus normal.             Musculoskeletal: Normal range of motion and moves all extremeties.       Neurological: She is alert and oriented to person, place, and time.    Skin: Skin is warm and dry. No cyanosis. Nails show no clubbing.    Psychiatric: She has a normal mood and affect. Her behavior is normal. Judgment and thought content normal.     Assessment/Plan:     22 y.o. female  for:    * Breech presentation  Scheduled for primary c/s  Anterior placenta  Admission labs pending  Ancef 2g    Postpartum care following  delivery  Awaiting more flatus  Hgb stable    39 weeks gestation of pregnancy           Disposition: As patient meets milestones, will plan to discharge POD2-3.    Boby Tavera,   Obstetrics  [facility] -  Labor and Delivery

## 2022-09-03 NOTE — ANESTHESIA POSTPROCEDURE EVALUATION
Anesthesia Post Evaluation    Patient: Stephanie Cisse    Procedure(s) Performed: Procedure(s) (LRB):   SECTION (N/A)    Final Anesthesia Type: spinal      Patient location during evaluation: labor & delivery  Patient participation: Yes- Able to Participate  Level of consciousness: awake and alert  Post-procedure vital signs: reviewed and stable  Pain management: adequate  Airway patency: patent  SILVIA mitigation strategies: Use of major conduction anesthesia (spinal/epidural) or peripheral nerve block  PONV status at discharge: No PONV  Anesthetic complications: no      Cardiovascular status: hemodynamically stable  Respiratory status: unassisted  Hydration status: euvolemic  Follow-up not needed.          Vitals Value Taken Time   /61 22   Temp 36.5 °C (97.7 °F) 22   Pulse 79 22   Resp 18 22   SpO2 99 % 22         Event Time   Out of Recovery 12:51:00         Pain/Deann Score: Pain Rating Prior to Med Admin: 4 (2022  9:27 PM)

## 2022-09-03 NOTE — SUBJECTIVE & OBJECTIVE
Interval History: POD1 s/p PLTCS    She is doing well this morning. She is tolerating a regular diet without nausea or vomiting. She is voiding spontaneously. She is ambulating. She has passed flatus, and has not a BM. Vaginal bleeding is mild. She denies fever or chills. Abdominal pain is mild and controlled with oral medications. She Is breastfeeding. She desires circumcision for her male baby: yes.    Objective:     Vital Signs (Most Recent):  Temp: 97.2 °F (36.2 °C) (09/03/22 0500)  Pulse: 69 (09/03/22 1138)  Resp: 18 (09/03/22 1141)  BP: 125/77 (09/03/22 1138)  SpO2: 99 % (09/02/22 1917) Vital Signs (24h Range):  Temp:  [97.2 °F (36.2 °C)-97.7 °F (36.5 °C)] 97.2 °F (36.2 °C)  Pulse:  [] 69  Resp:  [18] 18  SpO2:  [86 %-99 %] 99 %  BP: (118-182)/(61-81) 125/77     Weight: 88.3 kg (194 lb 9.6 oz)  Body mass index is 30.48 kg/m².      Intake/Output Summary (Last 24 hours) at 9/3/2022 1319  Last data filed at 9/3/2022 0845  Gross per 24 hour   Intake --   Output 1600 ml   Net -1600 ml         Significant Labs:  Lab Results   Component Value Date    GROUPTRH O POS 09/02/2022    HEPBSAG Non-Reactive 01/19/2022     Recent Labs   Lab 09/03/22  0534   HGB 8.2*   HCT 25.0*       CBC:   Recent Labs   Lab 09/03/22  0534   WBC 11.51*   RBC 2.74*   HGB 8.2*   HCT 25.0*   *   MCV 91.2   MCH 29.9   MCHC 32.8     I have personallly reviewed all pertinent lab results from the last 24 hours.  Recent Lab Results         09/03/22  0534        Baso # 0.02       Basophil % 0.2       Differential Type Auto       Eos # 0.06       Eosinophil % 0.5       Hematocrit 25.0       Hemoglobin 8.2       Immature Grans (Abs) 0.06       Immature Granulocytes 0.5       Lymph # 1.94       Lymph % 16.9       MCH 29.9       MCHC 32.8       MCV 91.2       Mono # 1.08       Mono % 9.4       MPV 11.1       Neutrophils, Abs 8.35       Neutrophils Relative 72.5       nRBC 0.0       NUCLEATED RBC ABSOLUTE 0.00       Platelets 141       RBC  2.74       RDW 12.3       WBC 11.51               Physical Exam:   Constitutional: She is oriented to person, place, and time. She appears well-developed and well-nourished.    HENT:   Head: Normocephalic and atraumatic.    Eyes: Pupils are equal, round, and reactive to light. EOM are normal.     Cardiovascular:  Normal rate.      Exam reveals no clubbing, no cyanosis and no edema.        Pulmonary/Chest: Effort normal.        Abdominal: Soft. She exhibits abdominal incision. She exhibits no distension. There is abdominal tenderness.     Genitourinary:    Uterus normal.             Musculoskeletal: Normal range of motion and moves all extremeties.       Neurological: She is alert and oriented to person, place, and time.    Skin: Skin is warm and dry. No cyanosis. Nails show no clubbing.    Psychiatric: She has a normal mood and affect. Her behavior is normal. Judgment and thought content normal.

## 2022-09-04 VITALS
TEMPERATURE: 98 F | WEIGHT: 194.63 LBS | HEIGHT: 67 IN | BODY MASS INDEX: 30.55 KG/M2 | SYSTOLIC BLOOD PRESSURE: 137 MMHG | OXYGEN SATURATION: 98 % | DIASTOLIC BLOOD PRESSURE: 65 MMHG | RESPIRATION RATE: 20 BRPM | HEART RATE: 78 BPM

## 2022-09-04 PROCEDURE — 25000003 PHARM REV CODE 250: Performed by: STUDENT IN AN ORGANIZED HEALTH CARE EDUCATION/TRAINING PROGRAM

## 2022-09-04 RX ORDER — HYDROCODONE BITARTRATE AND ACETAMINOPHEN 5; 300 MG/1; MG/1
1 TABLET ORAL EVERY 6 HOURS PRN
Qty: 15 EACH | Refills: 0 | Status: SHIPPED | OUTPATIENT
Start: 2022-09-04 | End: 2023-12-28

## 2022-09-04 RX ORDER — DOCUSATE SODIUM 100 MG/1
100 CAPSULE, LIQUID FILLED ORAL 2 TIMES DAILY
Qty: 28 CAPSULE | Refills: 0 | Status: SHIPPED | OUTPATIENT
Start: 2022-09-04 | End: 2022-09-18

## 2022-09-04 RX ORDER — HYDROCODONE BITARTRATE AND ACETAMINOPHEN 7.5; 325 MG/1; MG/1
1 TABLET ORAL EVERY 4 HOURS PRN
Status: DISCONTINUED | OUTPATIENT
Start: 2022-09-04 | End: 2022-09-04 | Stop reason: HOSPADM

## 2022-09-04 RX ORDER — IBUPROFEN 600 MG/1
600 TABLET ORAL EVERY 6 HOURS PRN
Qty: 30 TABLET | Refills: 0 | Status: SHIPPED | OUTPATIENT
Start: 2022-09-04 | End: 2023-12-28

## 2022-09-04 RX ORDER — HYDROCODONE BITARTRATE AND ACETAMINOPHEN 5; 325 MG/1; MG/1
1 TABLET ORAL EVERY 6 HOURS PRN
Qty: 15 TABLET | Refills: 0 | Status: SHIPPED | OUTPATIENT
Start: 2022-09-04 | End: 2023-12-28

## 2022-09-04 RX ADMIN — DOCUSATE SODIUM 200 MG: 100 CAPSULE, LIQUID FILLED ORAL at 09:09

## 2022-09-04 RX ADMIN — Medication 1 ML: at 01:09

## 2022-09-04 RX ADMIN — Medication 1 TABLET: at 09:09

## 2022-09-04 RX ADMIN — HYDROCODONE BITARTRATE AND ACETAMINOPHEN 1 TABLET: 7.5; 325 TABLET ORAL at 01:09

## 2022-09-04 RX ADMIN — IBUPROFEN 800 MG: 800 TABLET ORAL at 06:09

## 2022-09-04 RX ADMIN — OXYCODONE HYDROCHLORIDE AND ACETAMINOPHEN 1 TABLET: 10; 325 TABLET ORAL at 06:09

## 2022-09-04 NOTE — ASSESSMENT & PLAN NOTE
Awaiting more flatus  Hgb stable    Requesting DC home on POD#2  Postopcare reviewed  Infection and bleeding precautions given  Activity limits reviewed  Has appointment Thursday.

## 2022-09-04 NOTE — DISCHARGE SUMMARY
[facility] -  Labor and Delivery  Obstetrics  Discharge Summary      Patient Name: Stephanie Cisse  MRN: [id number]  Admission Date: 2022  Hospital Length of Stay: 2 days  Discharge Date and Time:  2022 3:12 PM  Attending Physician: Nisa Tavera, *   Discharging Provider: Mark Garzon MD   Primary Care Provider: Primary Doctor No    HPI: 22 y.o.  at 39w0d presents to L&D for scheduled PLTCS due to breech presentation. She denies vaginal bleeding, LOF, ctx. Reports good fetal movement. Prenatal care with Dr. Tavera. Pregnancy complicated by headaches, breech presentation. O pos, GBS neg, rubella imm.          Procedure(s) (LRB):   SECTION (N/A)     Hospital Course:   Patient underwent a P-LTCS at 39 weeks secondary to breech.  Postop course has been unremarkable and she is requesting DC on POD#2    Has no complaints    Normal lochia  Breastpumping    Ambulating, angela po and voiding without difficulty       Consults (From admission, onward)          Status Ordering Provider     Inpatient consult to Anesthesiology  Once        Provider:  (Not yet assigned)    Acknowledged NISA CAREY            Final Active Diagnoses:    Diagnosis Date Noted POA    PRINCIPAL PROBLEM:  Breech presentation [O32.1XX0] 2022 Yes     delivery delivered [O82] 2022 Unknown    Postpartum care following  delivery [Z39.2] 2022 Not Applicable    39 weeks gestation of pregnancy [Z3A.39] 2022 Not Applicable      Problems Resolved During this Admission:        Significant Diagnostic Studies: Labs:   CBC   Recent Labs   Lab 22  0534   WBC 11.51*   HGB 8.2*   HCT 25.0*   *         Feeding Method: breast    Immunizations       Date Immunization Status Dose Route/Site Given by    22 0212 MMR Deleted 0.5 mL Subcutaneous/     22 1205 Tdap Incomplete 0.5 mL Intramuscular/             Delivery:    Episiotomy:     Lacerations:     Repair  suture:     Repair # of packets:     Blood loss (ml):       Birth information:  YOB: 2022   Time of birth: 9:54 AM   Sex: male   Delivery type: , Low Transverse   Gestational Age: 39w0d    Delivery Clinician:      Other providers:       Additional  information:  Forceps:    Vacuum:    Breech:    Observed anomalies      Living?:           APGARS  One minute Five minutes Ten minutes   Skin color:         Heart rate:         Grimace:         Muscle tone:         Breathing:         Totals: 9  9        Placenta: Delivered:       appearance  Pending Diagnostic Studies:       Procedure Component Value Units Date/Time    EXTRA TUBES [[id number]] Collected: 22 0607    Order Status: Sent Lab Status: In process Updated: 22    Specimen: Blood, Venous     Narrative:      The following orders were created for panel order EXTRA TUBES.  Procedure                               Abnormality         Status                     ---------                               -----------         ------                     Light Green Top Hold[[id number]]                             In process                 Gold Top Hold[[id number]]                                    In process                   Please view results for these tests on the individual orders.          Review of Systems   Constitutional:  Negative for chills and fever.   Respiratory:  Negative for cough and shortness of breath.    Cardiovascular:  Negative for chest pain and leg swelling.   Gastrointestinal:  Negative for abdominal pain, constipation, diarrhea, nausea and vomiting.   Genitourinary:  Negative for dysuria, flank pain, frequency, hematuria, pelvic pain, urgency, vaginal bleeding, vaginal discharge and vaginal pain.   Musculoskeletal:  Negative for back pain.   Neurological:  Negative for headaches.   Psychiatric/Behavioral:  Negative for depression. The patient is not nervous/anxious.       Physical Exam  Constitutional:        General: She is not in acute distress.     Appearance: Normal appearance. She is not ill-appearing.   HENT:      Head: Normocephalic and atraumatic.   Eyes:      Pupils: Pupils are equal, round, and reactive to light.   Cardiovascular:      Rate and Rhythm: Normal rate.      Heart sounds: No murmur heard.    No friction rub. No gallop.   Pulmonary:      Effort: Pulmonary effort is normal.      Breath sounds: No wheezing, rhonchi or rales.   Abdominal:      General: There is no distension.      Palpations: Abdomen is soft.      Tenderness: There is no abdominal tenderness. There is no guarding or rebound.      Comments: Appropriate postop tenderness  Incision clean dry and intact without erythema, induration or drainage   Musculoskeletal:         General: Normal range of motion.      Cervical back: Normal range of motion.   Neurological:      Mental Status: She is alert and oriented to person, place, and time.   Skin:     General: Skin is warm and dry.      Findings: No erythema.   Psychiatric:         Mood and Affect: Mood normal.   Vitals reviewed. Exam conducted with a chaperone present.          Discharged Condition: good    Disposition: Home or Self Care    Follow Up:   Follow-up Information       Boby Tavera DO Follow up on 9/12/2022.    Specialty: Obstetrics and Gynecology  Why: For wound re-check  Contact information:  [address] [place] [id number]  [phone]                           Patient Instructions:      Diet Adult Regular     Pelvic Rest     No driving until:     Notify your health care provider if you experience any of the following:  temperature >100.4     Notify your health care provider if you experience any of the following:  persistent nausea and vomiting or diarrhea     Notify your health care provider if you experience any of the following:  severe uncontrolled pain     Notify your health care provider if you experience any of the following:  redness, tenderness, or signs  of infection (pain, swelling, redness, odor or green/yellow discharge around incision site)     Notify your health care provider if you experience any of the following:  difficulty breathing or increased cough     Notify your health care provider if you experience any of the following:  persistent dizziness, light-headedness, or visual disturbances     No dressing needed     Activity as tolerated     Medications:  Current Discharge Medication List        START taking these medications    Details   docusate sodium (COLACE) 100 MG capsule Take 1 capsule (100 mg total) by mouth 2 (two) times daily. for 14 days  Qty: 28 capsule, Refills: 0      HYDROcodone-acetaminophen 5-300 mg Tab Take 1 tablet by mouth every 6 (six) hours as needed.  Qty: 15 each, Refills: 0    Comments: n/a       ibuprofen (ADVIL,MOTRIN) 600 MG tablet Take 1 tablet (600 mg total) by mouth every 6 (six) hours as needed for Pain.  Qty: 30 tablet, Refills: 0           CONTINUE these medications which have NOT CHANGED    Details   pediatric multivit-iron-min (FLINTSTONES COMPLETE, IRON,) Chew Take 1 tablet by mouth once daily.           STOP taking these medications       butalbital-acetaminophen-caffeine -40 mg (FIORICET, ESGIC) -40 mg per tablet Comments:   Reason for Stopping:               Mark Garzon MD  Obstetrics  [facility] -  Labor and Delivery

## 2022-09-04 NOTE — HOSPITAL COURSE
Patient underwent a P-LTCS at 39 weeks secondary to breech.  Postop course has been unremarkable and she is requesting DC on POD#2    Has no complaints    Normal lochia  Breastpumping    Ambulating, angela po and voiding without difficulty

## 2022-09-13 ENCOUNTER — POSTPARTUM VISIT (OUTPATIENT)
Dept: OBSTETRICS AND GYNECOLOGY | Facility: CLINIC | Age: 22
End: 2022-09-13
Payer: COMMERCIAL

## 2022-09-13 VITALS
DIASTOLIC BLOOD PRESSURE: 70 MMHG | HEIGHT: 67 IN | BODY MASS INDEX: 26.53 KG/M2 | WEIGHT: 169 LBS | RESPIRATION RATE: 16 BRPM | SYSTOLIC BLOOD PRESSURE: 120 MMHG

## 2022-09-13 DIAGNOSIS — Z48.89 ENCOUNTER FOR POST SURGICAL WOUND CHECK: Primary | ICD-10-CM

## 2022-09-13 PROCEDURE — 99213 OFFICE O/P EST LOW 20 MIN: CPT | Mod: PBBFAC | Performed by: STUDENT IN AN ORGANIZED HEALTH CARE EDUCATION/TRAINING PROGRAM

## 2022-09-13 PROCEDURE — 99024 PR POST-OP FOLLOW-UP VISIT: ICD-10-PCS | Mod: ,,, | Performed by: STUDENT IN AN ORGANIZED HEALTH CARE EDUCATION/TRAINING PROGRAM

## 2022-09-13 PROCEDURE — 99024 POSTOP FOLLOW-UP VISIT: CPT | Mod: ,,, | Performed by: STUDENT IN AN ORGANIZED HEALTH CARE EDUCATION/TRAINING PROGRAM

## 2022-09-17 NOTE — PROGRESS NOTES
"Subjective:       Stephanie Cisse is a 22 y.o. female who presents to the clinic 1 weeks status post  for  breech . Eating a regular diet without difficulty. Bowel movements are normal. The patient is not having any pain. Mood stable.    The following portions of the patient's history were reviewed and updated as appropriate: allergies, current medications, past family history, past medical history, past social history, past surgical history, and problem list.    Review of Systems  Pertinent items are noted in HPI.      Objective:      /70 (BP Location: Left arm, Patient Position: Sitting)   Resp 16   Ht 5' 7" (1.702 m)   Wt 76.7 kg (169 lb)   LMP 2021 (Exact Date)   Breastfeeding Yes   BMI 26.47 kg/m²   General:  alert, appears stated age, and cooperative   Abdomen: soft, non-tender   Incision:   healing well, no drainage, no erythema, no hernia, no seroma, no swelling, no dehiscence, incision well approximated       Assessment:      Doing well postoperatively.       Plan:      1. Continue any current medications.  2. Wound care discussed.  3. Follow up: 2 weeks for postpartum.   "

## 2022-09-26 ENCOUNTER — POSTPARTUM VISIT (OUTPATIENT)
Dept: OBSTETRICS AND GYNECOLOGY | Facility: CLINIC | Age: 22
End: 2022-09-26
Payer: COMMERCIAL

## 2022-09-26 VITALS
HEIGHT: 67 IN | WEIGHT: 163 LBS | DIASTOLIC BLOOD PRESSURE: 72 MMHG | SYSTOLIC BLOOD PRESSURE: 116 MMHG | BODY MASS INDEX: 25.58 KG/M2

## 2022-09-26 DIAGNOSIS — Z30.09 GENERAL COUNSELING AND ADVICE ON CONTRACEPTIVE MANAGEMENT: ICD-10-CM

## 2022-09-26 DIAGNOSIS — Z13.32 ENCOUNTER FOR SCREENING FOR MATERNAL DEPRESSION: Primary | ICD-10-CM

## 2022-09-26 PROCEDURE — 99213 OFFICE O/P EST LOW 20 MIN: CPT | Mod: PBBFAC | Performed by: ADVANCED PRACTICE MIDWIFE

## 2022-09-26 PROCEDURE — 0503F POSTPARTUM CARE VISIT: CPT | Mod: S$PBB,,, | Performed by: ADVANCED PRACTICE MIDWIFE

## 2022-09-26 PROCEDURE — 1111F DSCHRG MED/CURRENT MED MERGE: CPT | Mod: CPTII,,, | Performed by: ADVANCED PRACTICE MIDWIFE

## 2022-09-26 PROCEDURE — 1111F PR DISCHARGE MEDS RECONCILED W/ CURRENT OUTPATIENT MED LIST: ICD-10-PCS | Mod: CPTII,,, | Performed by: ADVANCED PRACTICE MIDWIFE

## 2022-09-26 PROCEDURE — 0503F PR POSTPARTUM CARE VISIT: ICD-10-PCS | Mod: S$PBB,,, | Performed by: ADVANCED PRACTICE MIDWIFE

## 2022-09-26 NOTE — PROGRESS NOTES
"Subjective:       Stephanie Cisse is a 22 y.o. female who presents for a postpartum visit. She is 3 weeks postpartum following a low cervical transverse  section. I have fully reviewed the prenatal and intrapartum course. The delivery was at 39 gestational weeks. Outcome: primary  section, low transverse incision. Anesthesia: spinal. Postpartum course has been uncomplicated. Baby's course has been normal. Baby is feeding by breast. Bleeding thin lochia. Bowel function is normal. Bladder function is normal. Patient is not sexually active. Contraception method is none. Postpartum depression screening: negative.    The following portions of the patient's history were reviewed and updated as appropriate: allergies, current medications, past family history, past medical history, past social history, past surgical history, and problem list.    Review of Systems  Pertinent items are noted in HPI.     Objective:      /72   Ht 5' 7" (1.702 m)   Wt 73.9 kg (163 lb)   LMP 2021 (Exact Date)   Breastfeeding Yes   BMI 25.53 kg/m²    General:  alert, appears stated age, cooperative, and no distress    Breasts:  negative   Lungs: clear to auscultation bilaterally   Heart:  regular rate and rhythm, S1, S2 normal, no murmur, click, rub or gallop   Abdomen: soft, non-tender; bowel sounds normal; no masses,  no organomegaly                                  Assessment:      Normal postpartum exam. Pap smear not done at today's visit.     Plan:      1. Contraception: none  2. Discussed options for contraception safe for breastfeeding.   3. Follow up in: 3 weeks or as needed.   "

## 2022-10-11 ENCOUNTER — PATIENT MESSAGE (OUTPATIENT)
Dept: OBSTETRICS AND GYNECOLOGY | Facility: CLINIC | Age: 22
End: 2022-10-11
Payer: COMMERCIAL

## 2022-10-20 ENCOUNTER — POSTPARTUM VISIT (OUTPATIENT)
Dept: OBSTETRICS AND GYNECOLOGY | Facility: CLINIC | Age: 22
End: 2022-10-20
Payer: COMMERCIAL

## 2022-10-20 VITALS
HEIGHT: 67 IN | WEIGHT: 164 LBS | SYSTOLIC BLOOD PRESSURE: 110 MMHG | BODY MASS INDEX: 25.74 KG/M2 | DIASTOLIC BLOOD PRESSURE: 60 MMHG | RESPIRATION RATE: 16 BRPM

## 2022-10-20 DIAGNOSIS — Z30.9 ENCOUNTER FOR CONTRACEPTIVE MANAGEMENT, UNSPECIFIED TYPE: ICD-10-CM

## 2022-10-20 DIAGNOSIS — Z78.9 USES CONTRACEPTIVE IMPLANT FOR BIRTH CONTROL: Primary | ICD-10-CM

## 2022-10-20 DIAGNOSIS — Z30.430 ENCOUNTER FOR IUD INSERTION: ICD-10-CM

## 2022-10-20 PROCEDURE — 99213 OFFICE O/P EST LOW 20 MIN: CPT | Mod: PBBFAC | Performed by: STUDENT IN AN ORGANIZED HEALTH CARE EDUCATION/TRAINING PROGRAM

## 2022-10-20 PROCEDURE — 0503F PR POSTPARTUM CARE VISIT: ICD-10-PCS | Mod: CPTII,,, | Performed by: STUDENT IN AN ORGANIZED HEALTH CARE EDUCATION/TRAINING PROGRAM

## 2022-10-20 PROCEDURE — 0503F POSTPARTUM CARE VISIT: CPT | Mod: CPTII,,, | Performed by: STUDENT IN AN ORGANIZED HEALTH CARE EDUCATION/TRAINING PROGRAM

## 2022-10-25 NOTE — PROGRESS NOTES
"Subjective:       Stephanie Cisse is a 22 y.o. female who presents for a postpartum visit. She is 7 weeks postpartum following a PLTCS. I have fully reviewed the prenatal and intrapartum course. The delivery was at 39 gestational weeks. Outcome: primary  section, low transverse incision. Anesthesia: spinal. Postpartum course has been uncomplicated. Baby's course has been uncomplicated. Baby is feeding by breast. Bleeding no bleeding. Bowel function is normal. Bladder function is normal. Patient is not sexually active. Contraception method is abstinence. Postpartum depression screening: negative.    The following portions of the patient's history were reviewed and updated as appropriate: allergies, current medications, past family history, past medical history, past social history, past surgical history, and problem list.    Review of Systems  Pertinent items are noted in HPI.     Objective:      /60 (BP Location: Left arm, Patient Position: Sitting)   Resp 16   Ht 5' 7" (1.702 m)   Wt 74.4 kg (164 lb)   LMP 2021 (Exact Date)   Breastfeeding Yes   BMI 25.69 kg/m²    General:  alert, appears stated age, and cooperative    Breasts:  inspection negative, no nipple discharge or bleeding, no masses or nodularity palpable   Lungs: clear to auscultation bilaterally and normal percussion bilaterally   Heart:  regular rate and rhythm, S1, S2 normal, no murmur, click, rub or gallop and not examined   Abdomen: soft, non-tender; bowel sounds normal; no masses,  no organomegaly          Assessment:      Normal postpartum exam. Pap smear not done at today's visit.     Plan:      1. Contraception: mirena  3. Follow up in: 2 weeks or as needed.   "

## 2022-11-03 ENCOUNTER — PROCEDURE VISIT (OUTPATIENT)
Dept: OBSTETRICS AND GYNECOLOGY | Facility: CLINIC | Age: 22
End: 2022-11-03
Payer: COMMERCIAL

## 2022-11-03 VITALS
SYSTOLIC BLOOD PRESSURE: 110 MMHG | HEIGHT: 67 IN | BODY MASS INDEX: 25.68 KG/M2 | WEIGHT: 163.63 LBS | DIASTOLIC BLOOD PRESSURE: 70 MMHG

## 2022-11-03 DIAGNOSIS — Z30.430 ENCOUNTER FOR IUD INSERTION: Primary | ICD-10-CM

## 2022-11-03 DIAGNOSIS — Z97.5 IUD CONTRACEPTION: ICD-10-CM

## 2022-11-03 PROBLEM — O26.893 HEADACHE IN PREGNANCY, THIRD TRIMESTER: Status: RESOLVED | Noted: 2022-07-01 | Resolved: 2022-11-03

## 2022-11-03 PROBLEM — R51.9 HEADACHE IN PREGNANCY, THIRD TRIMESTER: Status: RESOLVED | Noted: 2022-07-01 | Resolved: 2022-11-03

## 2022-11-03 PROBLEM — R10.2: Status: RESOLVED | Noted: 2022-04-29 | Resolved: 2022-11-03

## 2022-11-03 PROBLEM — O47.1 FALSE LABOR AFTER 37 COMPLETED WEEKS OF GESTATION: Status: RESOLVED | Noted: 2022-08-26 | Resolved: 2022-11-03

## 2022-11-03 PROBLEM — Z3A.39 39 WEEKS GESTATION OF PREGNANCY: Status: RESOLVED | Noted: 2022-01-19 | Resolved: 2022-11-03

## 2022-11-03 PROBLEM — O26.893: Status: RESOLVED | Noted: 2022-04-29 | Resolved: 2022-11-03

## 2022-11-03 LAB
B-HCG UR QL: NEGATIVE
CTP QC/QA: YES

## 2022-11-03 PROCEDURE — 58300 INSERTION OF IUD: ICD-10-PCS | Mod: S$PBB,,, | Performed by: STUDENT IN AN ORGANIZED HEALTH CARE EDUCATION/TRAINING PROGRAM

## 2022-11-03 PROCEDURE — 99499 NO LOS: ICD-10-PCS | Mod: S$PBB,,, | Performed by: STUDENT IN AN ORGANIZED HEALTH CARE EDUCATION/TRAINING PROGRAM

## 2022-11-03 PROCEDURE — 81025 URINE PREGNANCY TEST: CPT | Mod: PBBFAC | Performed by: STUDENT IN AN ORGANIZED HEALTH CARE EDUCATION/TRAINING PROGRAM

## 2022-11-03 PROCEDURE — 99499 UNLISTED E&M SERVICE: CPT | Mod: S$PBB,,, | Performed by: STUDENT IN AN ORGANIZED HEALTH CARE EDUCATION/TRAINING PROGRAM

## 2022-11-03 PROCEDURE — 58300 INSERT INTRAUTERINE DEVICE: CPT | Mod: PBBFAC | Performed by: STUDENT IN AN ORGANIZED HEALTH CARE EDUCATION/TRAINING PROGRAM

## 2022-11-03 RX ADMIN — LEVONORGESTREL 1 INTRA UTERINE DEVICE: 52 INTRAUTERINE DEVICE INTRAUTERINE at 02:11

## 2022-11-03 RX ADMIN — LEVONORGESTREL 1 INTRA UTERINE DEVICE: 52 INTRAUTERINE DEVICE INTRAUTERINE at 03:11

## 2022-11-03 NOTE — PROCEDURES
Insertion of IUD    Date/Time: 11/3/2022 2:30 PM  Performed by: Boby Tavera DO  Authorized by: Boby Tavera DO     Consent:     Consent obtained:  Written    Consent given by:  Patient    Procedure risks and benefits discussed: yes      Patient questions answered: yes      Patient agrees, verbalizes understanding, and wants to proceed: yes      Educational handouts given: yes      Instructions and paperwork completed: yes    Procedure:     Negative urine pregnancy test: yes      Cervix cleaned and prepped: yes      Speculum placed in vagina: yes      Tenaculum applied to cervix: yes      Uterus sounded: yes      Uterus sound depth (cm):  7    IUD inserted with no complications: yes      Strings trimmed: yes    1 Intra Uterine Device levonorgestreL 20 mcg/24 hours (8 yrs) 52 mg     Post-procedure:     Patient tolerated procedure well: yes    Comments:      Follow up in four weeks for string check.

## 2022-12-07 ENCOUNTER — OFFICE VISIT (OUTPATIENT)
Dept: OBSTETRICS AND GYNECOLOGY | Facility: CLINIC | Age: 22
End: 2022-12-07
Payer: COMMERCIAL

## 2022-12-07 VITALS
BODY MASS INDEX: 25.33 KG/M2 | DIASTOLIC BLOOD PRESSURE: 60 MMHG | HEIGHT: 67 IN | SYSTOLIC BLOOD PRESSURE: 110 MMHG | RESPIRATION RATE: 16 BRPM | WEIGHT: 161.38 LBS

## 2022-12-07 DIAGNOSIS — Z30.431 IUD CHECK UP: ICD-10-CM

## 2022-12-07 PROCEDURE — 3008F PR BODY MASS INDEX (BMI) DOCUMENTED: ICD-10-PCS | Mod: CPTII,,, | Performed by: STUDENT IN AN ORGANIZED HEALTH CARE EDUCATION/TRAINING PROGRAM

## 2022-12-07 PROCEDURE — 99213 OFFICE O/P EST LOW 20 MIN: CPT | Mod: S$PBB,,, | Performed by: STUDENT IN AN ORGANIZED HEALTH CARE EDUCATION/TRAINING PROGRAM

## 2022-12-07 PROCEDURE — 3008F BODY MASS INDEX DOCD: CPT | Mod: CPTII,,, | Performed by: STUDENT IN AN ORGANIZED HEALTH CARE EDUCATION/TRAINING PROGRAM

## 2022-12-07 PROCEDURE — 3078F PR MOST RECENT DIASTOLIC BLOOD PRESSURE < 80 MM HG: ICD-10-PCS | Mod: CPTII,,, | Performed by: STUDENT IN AN ORGANIZED HEALTH CARE EDUCATION/TRAINING PROGRAM

## 2022-12-07 PROCEDURE — 1159F MED LIST DOCD IN RCRD: CPT | Mod: CPTII,,, | Performed by: STUDENT IN AN ORGANIZED HEALTH CARE EDUCATION/TRAINING PROGRAM

## 2022-12-07 PROCEDURE — 3074F SYST BP LT 130 MM HG: CPT | Mod: CPTII,,, | Performed by: STUDENT IN AN ORGANIZED HEALTH CARE EDUCATION/TRAINING PROGRAM

## 2022-12-07 PROCEDURE — 3074F PR MOST RECENT SYSTOLIC BLOOD PRESSURE < 130 MM HG: ICD-10-PCS | Mod: CPTII,,, | Performed by: STUDENT IN AN ORGANIZED HEALTH CARE EDUCATION/TRAINING PROGRAM

## 2022-12-07 PROCEDURE — 1159F PR MEDICATION LIST DOCUMENTED IN MEDICAL RECORD: ICD-10-PCS | Mod: CPTII,,, | Performed by: STUDENT IN AN ORGANIZED HEALTH CARE EDUCATION/TRAINING PROGRAM

## 2022-12-07 PROCEDURE — 99213 PR OFFICE/OUTPT VISIT, EST, LEVL III, 20-29 MIN: ICD-10-PCS | Mod: S$PBB,,, | Performed by: STUDENT IN AN ORGANIZED HEALTH CARE EDUCATION/TRAINING PROGRAM

## 2022-12-07 PROCEDURE — 99213 OFFICE O/P EST LOW 20 MIN: CPT | Mod: PBBFAC | Performed by: STUDENT IN AN ORGANIZED HEALTH CARE EDUCATION/TRAINING PROGRAM

## 2022-12-07 PROCEDURE — 3078F DIAST BP <80 MM HG: CPT | Mod: CPTII,,, | Performed by: STUDENT IN AN ORGANIZED HEALTH CARE EDUCATION/TRAINING PROGRAM

## 2022-12-07 NOTE — PROGRESS NOTES
"  Return Gyn Office Visit    Assessment/Plan  Problem List Items Addressed This Visit          Renal/    IUD check up    Overview     Presents for string check  Strings visualized              CC:   Chief Complaint   Patient presents with    IUD Check     HPI:  22 y.o. who presents to office for IUD string check.  C/o not being able to orgasm since baby was born.      Review of Systems - The following ROS was otherwise negative, except as noted in the HPI:  constitutional, respiratory, cardiovascular, gastrointestinal, genitourinary    Objective:  /60 (BP Location: Left arm, Patient Position: Sitting)   Resp 16   Ht 5' 7" (1.702 m)   Wt 73.2 kg (161 lb 6.4 oz)   Breastfeeding Yes   BMI 25.28 kg/m²   General: Alert, well appearing, no acute distress  Abdomen: Soft, nontender, nondistended   Pelvic:  External genitalia without masses or lesions.  Moist, pink vagina with physiologic discharge.  Cervix without polyps or masses, strings visualized.  Exam chaperoned by female assistant  Extremities: No redness or tenderness, neg Alisha's sign  Osteopathic: no TART changes     Follow-up in 6 months  "

## 2022-12-13 PROBLEM — Z30.431 IUD CHECK UP: Status: ACTIVE | Noted: 2022-12-13

## 2023-01-09 ENCOUNTER — TELEPHONE (OUTPATIENT)
Dept: OBSTETRICS AND GYNECOLOGY | Facility: CLINIC | Age: 23
End: 2023-01-09
Payer: COMMERCIAL

## 2023-01-09 RX ORDER — SERTRALINE HYDROCHLORIDE 50 MG/1
50 TABLET, FILM COATED ORAL DAILY
Qty: 30 TABLET | Refills: 2 | Status: SHIPPED | OUTPATIENT
Start: 2023-01-09 | End: 2023-12-28

## 2023-01-09 NOTE — TELEPHONE ENCOUNTER
Rtn pt's call, she stated that since about last week she's been feeling in the dumps, not happy, not wanting to go/do much of anything. She locked herself in her bedroom for hours, crying & she did think about how much easier things would be if she wasnt here. I asked pt has she had those thought again since the first time? Pt stated that she hasnt ,but the desire to do anything she loves just isnt there. Her &  went out, but she was miserable the whole time. 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 She has been avoiding phone calls & texts messages because she doesn't want to talk to anyone. I let her know she took the first step by calling. Her mom &  noticed the changes in her, that shows she has a strong support system & they care about her wellbeing. Just take small steps, she doesn't have to be happy when she calls someone. Let them know that she's not in the best place. I also let pt know whatever  decides to do she should also look into talking to a therapist as well. Let her know that any time she needs to talk we're here. Pt verbalized her understanding.

## 2023-01-09 NOTE — TELEPHONE ENCOUNTER
----- Message from Anyi Taylor sent at 1/9/2023  9:34 AM CST -----  [phone]   PT STATED SHE IS HAVING SOME POST PARTUM DEPRESSION AND WANTED TO SPEAK WITH SOMEBODY

## 2023-01-19 ENCOUNTER — PATIENT MESSAGE (OUTPATIENT)
Dept: OBSTETRICS AND GYNECOLOGY | Facility: CLINIC | Age: 23
End: 2023-01-19
Payer: COMMERCIAL

## 2023-01-19 ENCOUNTER — TELEPHONE (OUTPATIENT)
Dept: OBSTETRICS AND GYNECOLOGY | Facility: CLINIC | Age: 23
End: 2023-01-19
Payer: COMMERCIAL

## 2023-01-19 NOTE — TELEPHONE ENCOUNTER
----- Message from Mark Hoover sent at 1/18/2023  2:41 PM CST -----  Needs a nurse call back about the medicine she was put on.  [phone]

## 2023-01-31 ENCOUNTER — PATIENT MESSAGE (OUTPATIENT)
Dept: OBSTETRICS AND GYNECOLOGY | Facility: CLINIC | Age: 23
End: 2023-01-31
Payer: COMMERCIAL

## 2023-06-08 ENCOUNTER — TELEPHONE (OUTPATIENT)
Dept: OBSTETRICS AND GYNECOLOGY | Facility: CLINIC | Age: 23
End: 2023-06-08
Payer: COMMERCIAL

## 2023-09-07 ENCOUNTER — OFFICE VISIT (OUTPATIENT)
Dept: OBSTETRICS AND GYNECOLOGY | Facility: CLINIC | Age: 23
End: 2023-09-07
Payer: COMMERCIAL

## 2023-09-07 VITALS
HEIGHT: 67 IN | WEIGHT: 161 LBS | DIASTOLIC BLOOD PRESSURE: 66 MMHG | TEMPERATURE: 98 F | HEART RATE: 74 BPM | BODY MASS INDEX: 25.27 KG/M2 | RESPIRATION RATE: 16 BRPM | SYSTOLIC BLOOD PRESSURE: 105 MMHG

## 2023-09-07 DIAGNOSIS — R20.2 PARESTHESIA: Primary | ICD-10-CM

## 2023-09-07 PROCEDURE — 3078F PR MOST RECENT DIASTOLIC BLOOD PRESSURE < 80 MM HG: ICD-10-PCS | Mod: CPTII,,, | Performed by: STUDENT IN AN ORGANIZED HEALTH CARE EDUCATION/TRAINING PROGRAM

## 2023-09-07 PROCEDURE — 99214 OFFICE O/P EST MOD 30 MIN: CPT | Mod: PBBFAC | Performed by: STUDENT IN AN ORGANIZED HEALTH CARE EDUCATION/TRAINING PROGRAM

## 2023-09-07 PROCEDURE — 3074F PR MOST RECENT SYSTOLIC BLOOD PRESSURE < 130 MM HG: ICD-10-PCS | Mod: CPTII,,, | Performed by: STUDENT IN AN ORGANIZED HEALTH CARE EDUCATION/TRAINING PROGRAM

## 2023-09-07 PROCEDURE — 3008F PR BODY MASS INDEX (BMI) DOCUMENTED: ICD-10-PCS | Mod: CPTII,,, | Performed by: STUDENT IN AN ORGANIZED HEALTH CARE EDUCATION/TRAINING PROGRAM

## 2023-09-07 PROCEDURE — 3074F SYST BP LT 130 MM HG: CPT | Mod: CPTII,,, | Performed by: STUDENT IN AN ORGANIZED HEALTH CARE EDUCATION/TRAINING PROGRAM

## 2023-09-07 PROCEDURE — 1159F MED LIST DOCD IN RCRD: CPT | Mod: CPTII,,, | Performed by: STUDENT IN AN ORGANIZED HEALTH CARE EDUCATION/TRAINING PROGRAM

## 2023-09-07 PROCEDURE — 99213 OFFICE O/P EST LOW 20 MIN: CPT | Mod: S$PBB,,, | Performed by: STUDENT IN AN ORGANIZED HEALTH CARE EDUCATION/TRAINING PROGRAM

## 2023-09-07 PROCEDURE — 1159F PR MEDICATION LIST DOCUMENTED IN MEDICAL RECORD: ICD-10-PCS | Mod: CPTII,,, | Performed by: STUDENT IN AN ORGANIZED HEALTH CARE EDUCATION/TRAINING PROGRAM

## 2023-09-07 PROCEDURE — 3008F BODY MASS INDEX DOCD: CPT | Mod: CPTII,,, | Performed by: STUDENT IN AN ORGANIZED HEALTH CARE EDUCATION/TRAINING PROGRAM

## 2023-09-07 PROCEDURE — 3078F DIAST BP <80 MM HG: CPT | Mod: CPTII,,, | Performed by: STUDENT IN AN ORGANIZED HEALTH CARE EDUCATION/TRAINING PROGRAM

## 2023-09-07 PROCEDURE — 99213 PR OFFICE/OUTPT VISIT, EST, LEVL III, 20-29 MIN: ICD-10-PCS | Mod: S$PBB,,, | Performed by: STUDENT IN AN ORGANIZED HEALTH CARE EDUCATION/TRAINING PROGRAM

## 2023-09-07 NOTE — PROGRESS NOTES
"Return Gyn Office Visit    Assessment/Plan  Problem List Items Addressed This Visit    None  Visit Diagnoses       Paresthesia    -  Primary    Relevant Orders    Ambulatory referral/consult to Neurology              CC:   Chief Complaint   Patient presents with    Low-back Pain     HPI:  23 y.o. who presents to office for back issues since having her baby in 9/2022.    Review of Systems - The following ROS was otherwise negative, except as noted in the HPI:  constitutional, respiratory, cardiovascular, gastrointestinal, genitourinary    Objective:  /66   Pulse 74   Temp 98.1 °F (36.7 °C)   Resp 16   Ht 5' 7" (1.702 m)   Wt 73 kg (161 lb)   Breastfeeding No   BMI 25.22 kg/m²   General: Alert, well appearing, no acute distress  Abdomen: Soft, nontender, nondistended  Back: decreased sensation over bilateral lumbar region  Extremities: No redness or tenderness, neg Alisha's sign  Osteopathic: no TART changes     Refer to neurology.  Follow-up in 2 months for annual exam.  "

## 2023-09-28 ENCOUNTER — PATIENT MESSAGE (OUTPATIENT)
Dept: OBSTETRICS AND GYNECOLOGY | Facility: CLINIC | Age: 23
End: 2023-09-28
Payer: COMMERCIAL

## 2023-11-14 ENCOUNTER — TELEPHONE (OUTPATIENT)
Dept: OBSTETRICS AND GYNECOLOGY | Facility: CLINIC | Age: 23
End: 2023-11-14
Payer: COMMERCIAL

## 2023-11-14 NOTE — TELEPHONE ENCOUNTER
----- Message from Avani Roy sent at 11/14/2023 10:45 AM CST -----  Regarding: IUD  Patient want to discuses getting IUD remove, also about a referral for her back. Please call her @ [phone]

## 2023-11-15 ENCOUNTER — TELEPHONE (OUTPATIENT)
Dept: OBSTETRICS AND GYNECOLOGY | Facility: CLINIC | Age: 23
End: 2023-11-15
Payer: COMMERCIAL

## 2023-11-16 ENCOUNTER — TELEPHONE (OUTPATIENT)
Dept: OBSTETRICS AND GYNECOLOGY | Facility: CLINIC | Age: 23
End: 2023-11-16
Payer: COMMERCIAL

## 2023-12-27 RX ORDER — CARIPRAZINE 1.5 MG/1
CAPSULE, GELATIN COATED ORAL
COMMUNITY
Start: 2023-11-08 | End: 2024-01-25

## 2023-12-27 RX ORDER — CEPHALEXIN 500 MG/1
500 CAPSULE ORAL EVERY 6 HOURS
COMMUNITY
Start: 2023-07-24

## 2023-12-28 ENCOUNTER — OFFICE VISIT (OUTPATIENT)
Dept: NEUROLOGY | Facility: CLINIC | Age: 23
End: 2023-12-28
Payer: COMMERCIAL

## 2023-12-28 VITALS
RESPIRATION RATE: 18 BRPM | SYSTOLIC BLOOD PRESSURE: 110 MMHG | DIASTOLIC BLOOD PRESSURE: 63 MMHG | WEIGHT: 140 LBS | HEIGHT: 67 IN | BODY MASS INDEX: 21.97 KG/M2 | OXYGEN SATURATION: 99 % | HEART RATE: 68 BPM

## 2023-12-28 DIAGNOSIS — R20.2 PARESTHESIA: Primary | ICD-10-CM

## 2023-12-28 DIAGNOSIS — M54.9 DORSALGIA, UNSPECIFIED: ICD-10-CM

## 2023-12-28 PROCEDURE — 99204 PR OFFICE/OUTPT VISIT, NEW, LEVL IV, 45-59 MIN: ICD-10-PCS | Mod: S$PBB,,, | Performed by: PSYCHIATRY & NEUROLOGY

## 2023-12-28 PROCEDURE — 3078F DIAST BP <80 MM HG: CPT | Mod: CPTII,,, | Performed by: PSYCHIATRY & NEUROLOGY

## 2023-12-28 PROCEDURE — 1159F PR MEDICATION LIST DOCUMENTED IN MEDICAL RECORD: ICD-10-PCS | Mod: CPTII,,, | Performed by: PSYCHIATRY & NEUROLOGY

## 2023-12-28 PROCEDURE — 3074F PR MOST RECENT SYSTOLIC BLOOD PRESSURE < 130 MM HG: ICD-10-PCS | Mod: CPTII,,, | Performed by: PSYCHIATRY & NEUROLOGY

## 2023-12-28 PROCEDURE — 3078F PR MOST RECENT DIASTOLIC BLOOD PRESSURE < 80 MM HG: ICD-10-PCS | Mod: CPTII,,, | Performed by: PSYCHIATRY & NEUROLOGY

## 2023-12-28 PROCEDURE — 3008F PR BODY MASS INDEX (BMI) DOCUMENTED: ICD-10-PCS | Mod: CPTII,,, | Performed by: PSYCHIATRY & NEUROLOGY

## 2023-12-28 PROCEDURE — 1159F MED LIST DOCD IN RCRD: CPT | Mod: CPTII,,, | Performed by: PSYCHIATRY & NEUROLOGY

## 2023-12-28 PROCEDURE — 99204 OFFICE O/P NEW MOD 45 MIN: CPT | Mod: S$PBB,,, | Performed by: PSYCHIATRY & NEUROLOGY

## 2023-12-28 PROCEDURE — 99215 OFFICE O/P EST HI 40 MIN: CPT | Mod: PBBFAC | Performed by: PSYCHIATRY & NEUROLOGY

## 2023-12-28 PROCEDURE — 3008F BODY MASS INDEX DOCD: CPT | Mod: CPTII,,, | Performed by: PSYCHIATRY & NEUROLOGY

## 2023-12-28 PROCEDURE — 3074F SYST BP LT 130 MM HG: CPT | Mod: CPTII,,, | Performed by: PSYCHIATRY & NEUROLOGY

## 2023-12-28 RX ORDER — ATOMOXETINE 40 MG/1
40 CAPSULE ORAL EVERY MORNING
COMMUNITY
Start: 2023-12-13

## 2023-12-28 RX ORDER — PREGABALIN 75 MG/1
75 CAPSULE ORAL 2 TIMES DAILY
Qty: 180 CAPSULE | Refills: 3 | Status: SHIPPED | OUTPATIENT
Start: 2023-12-28 | End: 2024-01-03

## 2023-12-28 NOTE — PROGRESS NOTES
Subjective:       Patient ID: Stephanie Cisse is a 23 y.o. female     Chief Complaint:    Chief Complaint   Patient presents with    Numbness     Pt. States numbness in lower back.        Allergies:  Percocet [oxycodone-acetaminophen]    Current Medications:    Outpatient Encounter Medications as of 12/28/2023   Medication Sig Dispense Refill    atomoxetine (STRATTERA) 40 MG capsule Take 40 mg by mouth every morning.      VRAYLAR 1.5 mg Cap TAKE ONE CAPSULE BY MOUTH ONCE DAILY FOR mood      cephALEXin (KEFLEX) 500 MG capsule Take 500 mg by mouth every 6 (six) hours.      pediatric multivit-iron-min (FLINTSTONES COMPLETE, IRON,) Chew Take 1 tablet by mouth once daily.      [DISCONTINUED] HYDROcodone-acetaminophen (NORCO) 5-325 mg per tablet Take 1 tablet by mouth every 6 (six) hours as needed for Pain. (Patient not taking: Reported on 9/13/2022) 15 tablet 0    [DISCONTINUED] HYDROcodone-acetaminophen 5-300 mg Tab Take 1 tablet by mouth every 6 (six) hours as needed. (Patient not taking: Reported on 9/13/2022) 15 each 0    [DISCONTINUED] ibuprofen (ADVIL,MOTRIN) 600 MG tablet Take 1 tablet (600 mg total) by mouth every 6 (six) hours as needed for Pain. (Patient not taking: Reported on 9/13/2022) 30 tablet 0    [DISCONTINUED] sertraline (ZOLOFT) 50 MG tablet Take 1 tablet (50 mg total) by mouth once daily. (Patient not taking: Reported on 12/28/2023) 30 tablet 2     Facility-Administered Encounter Medications as of 12/28/2023   Medication Dose Route Frequency Provider Last Rate Last Admin    levonorgestreL (MIRENA) 20 mcg/24 hours (8 yrs) 52 mg IUD 1 Intra Uterine Device  1 Intra Uterine Device Intrauterine  Boby Tavera,    1 Intra Uterine Device at 11/03/22 1743       History of Present Illness  24 yo WF w/ 16 months hx of isolated area of numbness in b/l lower lumbar paraspinal region that began after getting epidural anesthesia prior to C section   She denies any radicular symptoms and pain symptoms  "have slightly abated   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   She may benefit from PT/Rehab and small dose of Gabapentin         Past Medical History:   Diagnosis Date    34 weeks gestation of pregnancy 2022    Estimated Date of Delivery: 22 established by LMP, 6w6d US consistent  First Trimester - Prenatal Battery: WNL - UDS: neg - Pap Smear: NILM - GC/C: neg  Second Trimester - Anatomy Ultrasound: no abnormalities  Third Trimester - 28 wk labs:  - Rh Status: O pos - 1Hr GCT:  - Fetal Kick Count: - GC/C:  - GBS:   Vaccines - Influenza: discussed - TDAP: Plan to give around 32wks - COVID: discussed      Acne     Hypoglycemia        Past Surgical History:   Procedure Laterality Date     SECTION N/A 2022    Procedure:  SECTION;  Surgeon: Boby Tavera DO;  Location: [facility] L&D;  Service: OB/GYN;  Laterality: N/A;       Social History  Ms. Cisse  reports that she has never smoked. She has never used smokeless tobacco. She reports that she does not currently use drugs. She reports that she does not drink alcohol.    Family History  Ms.'s Cisse family history includes Clotting disorder in her mother and sister; Heart disease in her mother; No Known Problems in her brother, brother, father, sister, and sister; Protein S deficiency in her mother; Stroke in her mother.    Review of Systems  Review of Systems   Musculoskeletal:  Positive for back pain.   Neurological:  Positive for sensory change.   All other systems reviewed and are negative.     Objective:   /63 (BP Location: Right arm, Patient Position: Sitting, BP Method: Large (Manual))   Pulse 68   Resp 18   Ht 5' 7" (1.702 m)   Wt 63.5 kg (140 lb)   SpO2 99%   BMI 21.93 kg/m²    NEUROLOGICAL EXAMINATION:     MENTAL STATUS   Oriented to person, place, and time.   Level of consciousness: alert  Knowledge: good.     CRANIAL NERVES   Cranial nerves II through XII intact.     MOTOR EXAM     Strength   Strength " 5/5 throughout.     GAIT AND COORDINATION     Gait  Gait: normal       Physical Exam  Vitals reviewed.   Constitutional:       Appearance: She is normal weight.   Neurological:      General: No focal deficit present.      Mental Status: She is alert and oriented to person, place, and time. Mental status is at baseline.      Cranial Nerves: Cranial nerves 2-12 are intact.      Motor: Motor strength is normal.     Gait: Gait is intact.          Assessment:     Paresthesia  -     Ambulatory referral/consult to Neurology         Primary Diagnosis and ICD10  Paresthesia [R20.2]    Plan:     Patient Instructions   Mri Lumbar spine   Refer PT/Rehab   Trial of Lyrica 75 mg twice daily  F/u 3 months     Medications Discontinued During This Encounter   Medication Reason    sertraline (ZOLOFT) 50 MG tablet     ibuprofen (ADVIL,MOTRIN) 600 MG tablet     HYDROcodone-acetaminophen (NORCO) 5-325 mg per tablet     HYDROcodone-acetaminophen 5-300 mg Tab        Requested Prescriptions      No prescriptions requested or ordered in this encounter

## 2024-01-03 RX ORDER — GABAPENTIN 300 MG/1
300 CAPSULE ORAL 2 TIMES DAILY
Qty: 180 CAPSULE | Refills: 3 | Status: SHIPPED | OUTPATIENT
Start: 2024-01-03

## 2024-01-10 ENCOUNTER — HOSPITAL ENCOUNTER (OUTPATIENT)
Dept: RADIOLOGY | Facility: HOSPITAL | Age: 24
Discharge: HOME | End: 2024-01-10
Attending: STUDENT IN AN ORGANIZED HEALTH CARE EDUCATION/TRAINING PROGRAM
Payer: COMMERCIAL

## 2024-01-10 ENCOUNTER — OFFICE VISIT (OUTPATIENT)
Dept: OBSTETRICS AND GYNECOLOGY | Facility: CLINIC | Age: 24
End: 2024-01-10
Payer: COMMERCIAL

## 2024-01-10 VITALS
BODY MASS INDEX: 21.97 KG/M2 | WEIGHT: 140 LBS | RESPIRATION RATE: 16 BRPM | SYSTOLIC BLOOD PRESSURE: 98 MMHG | DIASTOLIC BLOOD PRESSURE: 64 MMHG | HEIGHT: 67 IN | HEART RATE: 100 BPM

## 2024-01-10 DIAGNOSIS — Z30.431 IUD CHECK UP: ICD-10-CM

## 2024-01-10 DIAGNOSIS — Z97.5 ATTEMPTED IUD REMOVAL, UNSUCCESSFUL: ICD-10-CM

## 2024-01-10 DIAGNOSIS — Z53.8 ATTEMPTED IUD REMOVAL, UNSUCCESSFUL: ICD-10-CM

## 2024-01-10 DIAGNOSIS — Z30.432 ENCOUNTER FOR IUD REMOVAL: ICD-10-CM

## 2024-01-10 DIAGNOSIS — Z30.431 IUD CHECK UP: Primary | ICD-10-CM

## 2024-01-10 PROCEDURE — 3074F SYST BP LT 130 MM HG: CPT | Mod: CPTII,,, | Performed by: STUDENT IN AN ORGANIZED HEALTH CARE EDUCATION/TRAINING PROGRAM

## 2024-01-10 PROCEDURE — 3078F DIAST BP <80 MM HG: CPT | Mod: CPTII,,, | Performed by: STUDENT IN AN ORGANIZED HEALTH CARE EDUCATION/TRAINING PROGRAM

## 2024-01-10 PROCEDURE — 99214 OFFICE O/P EST MOD 30 MIN: CPT | Mod: S$PBB,,, | Performed by: STUDENT IN AN ORGANIZED HEALTH CARE EDUCATION/TRAINING PROGRAM

## 2024-01-10 PROCEDURE — 99213 OFFICE O/P EST LOW 20 MIN: CPT | Mod: PBBFAC,25 | Performed by: STUDENT IN AN ORGANIZED HEALTH CARE EDUCATION/TRAINING PROGRAM

## 2024-01-10 PROCEDURE — 76856 US EXAM PELVIC COMPLETE: CPT | Mod: 26,,, | Performed by: STUDENT IN AN ORGANIZED HEALTH CARE EDUCATION/TRAINING PROGRAM

## 2024-01-10 PROCEDURE — 3008F BODY MASS INDEX DOCD: CPT | Mod: CPTII,,, | Performed by: STUDENT IN AN ORGANIZED HEALTH CARE EDUCATION/TRAINING PROGRAM

## 2024-01-10 PROCEDURE — 1159F MED LIST DOCD IN RCRD: CPT | Mod: CPTII,,, | Performed by: STUDENT IN AN ORGANIZED HEALTH CARE EDUCATION/TRAINING PROGRAM

## 2024-01-10 PROCEDURE — 76856 US EXAM PELVIC COMPLETE: CPT | Mod: TC

## 2024-01-10 RX ORDER — ALPRAZOLAM 0.5 MG/1
0.5 TABLET ORAL ONCE
Qty: 1 TABLET | Refills: 0 | Status: SHIPPED | OUTPATIENT
Start: 2024-01-10 | End: 2024-01-10

## 2024-01-10 NOTE — PROGRESS NOTES
"Return Gyn Office Visit    Assessment/Plan  Problem List Items Addressed This Visit          Renal/    IUD check up - Primary    Relevant Orders    US Pelvis Complete Non OB (Completed)         CC:   Chief Complaint   Patient presents with    Iud Removal     HPI:  23 y.o. who presents to office for IUD removal. Desires to conceive.    Review of Systems - The following ROS was otherwise negative, except as noted in the HPI:  constitutional, respiratory, cardiovascular, gastrointestinal, genitourinary    Objective:  BP 98/64   Pulse 100   Resp 16   Ht 5' 7" (1.702 m)   Wt 63.5 kg (140 lb)   Breastfeeding No   BMI 21.93 kg/m²   General: Alert, well appearing, no acute distress  Abdomen: Soft, nontender, nondistended   Pelvic: External genitalia without masses or lesions.  Moist, pink vagina with physiologic discharge.  Cervix without polyps or masses, unable to visualize strings.    Extremities: No redness or tenderness, neg Alisha's sign  Osteopathic: no TART changes     Unable to remove IUD in office.  US confirms IUD in position.  Return for removal via Endosee  Discussed pre-procedure medications  "

## 2024-01-17 ENCOUNTER — TELEPHONE (OUTPATIENT)
Dept: OBSTETRICS AND GYNECOLOGY | Facility: CLINIC | Age: 24
End: 2024-01-17
Payer: COMMERCIAL

## 2024-01-22 RX ORDER — MISOPROSTOL 200 UG/1
200 TABLET ORAL ONCE
Qty: 1 TABLET | Refills: 0 | Status: SHIPPED | OUTPATIENT
Start: 2024-01-22 | End: 2024-01-22

## 2024-01-25 ENCOUNTER — CLINICAL SUPPORT (OUTPATIENT)
Dept: REHABILITATION | Facility: HOSPITAL | Age: 24
End: 2024-01-25
Payer: COMMERCIAL

## 2024-01-25 ENCOUNTER — OFFICE VISIT (OUTPATIENT)
Dept: OBSTETRICS AND GYNECOLOGY | Facility: CLINIC | Age: 24
End: 2024-01-25
Payer: COMMERCIAL

## 2024-01-25 VITALS
HEART RATE: 68 BPM | HEIGHT: 67 IN | SYSTOLIC BLOOD PRESSURE: 105 MMHG | DIASTOLIC BLOOD PRESSURE: 64 MMHG | WEIGHT: 144.19 LBS | BODY MASS INDEX: 22.63 KG/M2

## 2024-01-25 DIAGNOSIS — M54.9 DORSALGIA, UNSPECIFIED: Primary | ICD-10-CM

## 2024-01-25 DIAGNOSIS — R53.1 DECREASED STRENGTH: ICD-10-CM

## 2024-01-25 DIAGNOSIS — R20.2 PARESTHESIA: ICD-10-CM

## 2024-01-25 DIAGNOSIS — R52 PAIN: ICD-10-CM

## 2024-01-25 DIAGNOSIS — Z30.432 ENCOUNTER FOR IUD REMOVAL: Primary | ICD-10-CM

## 2024-01-25 PROCEDURE — 99213 OFFICE O/P EST LOW 20 MIN: CPT | Mod: PBBFAC | Performed by: STUDENT IN AN ORGANIZED HEALTH CARE EDUCATION/TRAINING PROGRAM

## 2024-01-25 PROCEDURE — 99499 UNLISTED E&M SERVICE: CPT | Mod: S$PBB,,, | Performed by: STUDENT IN AN ORGANIZED HEALTH CARE EDUCATION/TRAINING PROGRAM

## 2024-01-25 PROCEDURE — 58562 HYSTEROSCOPY REMOVE FB: CPT | Mod: S$PBB,,, | Performed by: STUDENT IN AN ORGANIZED HEALTH CARE EDUCATION/TRAINING PROGRAM

## 2024-01-25 PROCEDURE — 97161 PT EVAL LOW COMPLEX 20 MIN: CPT

## 2024-01-25 PROCEDURE — 97140 MANUAL THERAPY 1/> REGIONS: CPT

## 2024-01-25 PROCEDURE — 58562 HYSTEROSCOPY REMOVE FB: CPT | Mod: PBBFAC | Performed by: STUDENT IN AN ORGANIZED HEALTH CARE EDUCATION/TRAINING PROGRAM

## 2024-01-25 NOTE — PLAN OF CARE
"[facility]  Physical Therapy Initial Evaluation    Date: 2024   Name: Stephanie Cisse  Clinic Number: [id number]    Therapy Diagnosis:   Encounter Diagnoses   Name Primary?    Paresthesia     Dorsalgia, unspecified      Physician: George Cantu MD    Physician Orders: PT Eval and Treat   Medical Diagnosis from Referral: Dorsalgia   Evaluation Date: 2024  Authorization Period Expiration: 24  Plan of Care Expiration: 24  Visit # / Visits authorized: 1/ no limit    Time In: 110  Time Out: 145  Total Appointment Time (timed & untimed codes): 35 minutes    Precautions: Standard    Subjective   Date of onset: 22    History of current condition - Stephanie reports: After receiving an epidural for a  there is a specific area in the low back that stays numb. Intermittent sharp pain in the low back and occasionally feels like a "catch". Worse with prolonged standing and with a certain way of sitting. The pain is on the right lower back. Right handed. Denies numbness/tingling and no sensation deficits. Feels stable with no falls. Able to sleep through the night. Relief with hot bath.   See MD 3/28  MRI scheduled      Medical History:   Past Medical History:   Diagnosis Date    34 weeks gestation of pregnancy 2022    Estimated Date of Delivery: 22 established by LMP, 6w6d US consistent  First Trimester - Prenatal Battery: WNL - UDS: neg - Pap Smear: NILM - GC/C: neg  Second Trimester - Anatomy Ultrasound: no abnormalities  Third Trimester - 28 wk labs:  - Rh Status: O pos - 1Hr GCT:  - Fetal Kick Count: - GC/C:  - GBS:   Vaccines - Influenza: discussed - TDAP: Plan to give around 32wks - COVID: discussed      Acne     Hypoglycemia        Surgical History:   Stephanie Cisse  has a past surgical history that includes  section (N/A, 2022).    Medications:   Stephanie has a current medication list which includes the following prescription(s): alprazolam, " atomoxetine, cephalexin, gabapentin, misoprostol, flintstones complete (iron), and vraylar, and the following Facility-Administered Medications: levonorgestrel.    Allergies:   Review of patient's allergies indicates:   Allergen Reactions    Acetaminophen     Oxycodone hcl     Percocet [oxycodone-acetaminophen] Itching        Imaging, MRI studies: scheduled for 2/2    Pain:  Current 2/10, worst 8/10,  Location: right back    Description: Sharp  Aggravating Factors: Sitting and Standing  Easing Factors: hot bath and rest        Objective     Manual muscle test bilateral lower extremities 5/5 except bilateral glute med and max 4/5   Bilateral light touch intact lower extremities   Right anterior rotation of innonimate  No quad lag  Relief with passive posterior overpressure on right  (-) bilateral sitting slump  (-) bilateral straight leg raise   (-) bilateral NBA but did have relief with right stretch  Mild left multifidus weakness and tenderness with palpation  Normal lumbar pivm         Completed MET in side lying with left innoinimate posterior        Limitation/Restriction for FOTO Survey    Therapist reviewed FOTO scores for Stephanie Cisse on 1/25/2024.   FOTO documents entered into [employer] - see Media section.    Intake Score: 67%       Home Exercises and Patient Education Provided    Education provided:   Eval Findings  - Patient educated on biomechanical justification for therapeutic exercise and importance of compliance with HEP in order to improve overall impairments and QOL   Patient was educated on all the above exercise prior/during/after for proper posture, positioning, and execution for safe performance with home exercise program.   Patient educated on the importance of improved core and upper and lower extremity strength in order to improve alignment of the spine and upper and lower extremities with static positions and dynamic movement.   Patient educated on the importance of strong core and lower  extremity musculature in order to improve both static and dynamic balance, improve gait mechanics, reduce fall risk and improve household and community mobility.     Written Home Exercises Provided: yes.  Exercises were reviewed and Stephanie was able to demonstrate them prior to the end of the session.  Stephanie demonstrated good  understanding of the education provided.     See EMR under Patient Instructions for exercises provided 1/25/2024.    Assessment   Stephanie is a 23 y.o. female referred to outpatient Physical Therapy with a medical diagnosis of dorsalgia. Patient presents with right anterior innonimate with leg length difference. Patient had relief with manual posterior pressure so manual therapy was applied. Patient felt relief after manual and was given home exercise program for carryover at home. Patient has decreased strength over bilateral glute medius and lashonda as well as left multifidus and generalized decreased flexibility. Patient will benefit from skilled physical therapy at this time to address deficits.     Patient prognosis is Good.     Patient will benefit from skilled outpatient Physical Therapy to address the deficits stated above and in the chart below, provide patient /family education, and to maximize patientt's level of independence.     Plan of care discussed with patient: Yes  Patient's spiritual, cultural and educational needs considered and patient is agreeable to the plan of care and goals as stated below:     Anticipated Barriers for therapy: none      Medical Necessity is demonstrated by the following  History  Co-morbidities and personal factors that may impact the plan of care [x] LOW: no personal factors / co-morbidities  [] MODERATE: 1-2 personal factors / co-morbidities  [] HIGH: 3+ personal factors / co-morbidities    Moderate / High Support Documentation:   Co-morbidities affecting plan of care: -    Personal Factors:   no deficits     Examination  Body Structures and Functions,  activity limitations and participation restrictions that may impact the plan of care [x] LOW: addressing 1-2 elements  [] MODERATE: 3+ elements  [] HIGH: 4+ elements (please support below)    Moderate / High Support Documentation: -     Clinical Presentation [x] LOW: stable  [] MODERATE: Evolving  [] HIGH: Unstable     Decision Making/ Complexity Score: low         Goals:  Short Term Goals: 6 weeks   Patient will report not feeling a catch or sharp pain with any lumbar movement  Patient will improve manual muscle test to 4+/5 for bilateral lower extremities   Patient will maintain prolonged positions x 15 minutes with 0 /10 pain    Long Term Goals: 12 weeks   Patient will be independent with home exercise program by D/C  Patient will improve manual muscle test to 5/5  Patient will maintain prolonged positions x 30 minutes with  0 /10 pain    Plan   Plan of care Certification: 1/25/2024 to 4/18/24.    Outpatient Physical Therapy 2 times weekly for 12 weeks to include the following interventions: Aquatic Therapy, Hybresis with Dex, Cervical/Lumbar Traction, Electrical Stimulation IFC/Premod, Gait Training, Manual Therapy, Moist Heat/ Ice, Neuromuscular Re-ed, Patient Education, Self Care, Therapeutic Activities, Therapeutic Exercise, Ultrasound, and Dry Needling.     RAMON PRETTY, PT        I CERTIFY THE NEED FOR THESE SERVICES FURNISHED UNDER THIS PLAN OF TREATMENT AND WHILE UNDER MY CARE.    Physician's comments:      Physician's Signature: ____________________________________________Date________________        Please fax this MD signed form to:  [facility]  [phone] fax

## 2024-01-26 PROBLEM — R53.1 DECREASED STRENGTH: Status: ACTIVE | Noted: 2024-01-26

## 2024-01-26 PROBLEM — R52 PAIN: Status: ACTIVE | Noted: 2024-01-26

## 2024-01-29 NOTE — PROCEDURES
Procedures    End[facility]e Note:    Following informed written consent, the pt was placed in the lithotomy position. The speculum was inserted into the vagina and the cervix visualized with ease. The cervix was cleaned with betadine. A single tooth tenaculum was placed on the anterior cervical lip.    The uterus was sounded to 7 cm with the uterine sound. The hysteroscope was then inserted into the cervical os and the uterine cavity was directly visualized, bilateral ostia were noted at that time. Findings: retained IUD. An operative grasper was inserted and the IUD was grasped and removed from the uterine cavity with the hysteroscope. The single tooth tenaculum was then removed and the cervix was made hemostatic with silver nitrate.      Pain and bleeding precautions given.  Recommend daily PNV.    M. Alyse Boswell [facility]

## 2024-01-30 ENCOUNTER — CLINICAL SUPPORT (OUTPATIENT)
Dept: REHABILITATION | Facility: HOSPITAL | Age: 24
End: 2024-01-30
Payer: COMMERCIAL

## 2024-01-30 DIAGNOSIS — R52 PAIN: Primary | ICD-10-CM

## 2024-01-30 DIAGNOSIS — R53.1 DECREASED STRENGTH: ICD-10-CM

## 2024-01-30 PROCEDURE — 97110 THERAPEUTIC EXERCISES: CPT | Mod: CQ

## 2024-01-30 PROCEDURE — 97112 NEUROMUSCULAR REEDUCATION: CPT | Mod: CQ

## 2024-01-30 NOTE — PROGRESS NOTES
[facility]   Physical Therapy Treatment Note     Name: Stephanie Cisse  Clinic Number: [id number]    Therapy Diagnosis:   Encounter Diagnoses   Name Primary?    Pain Yes    Decreased strength      Physician: George Cantu MD    Visit Date: 1/30/2024    Physician Orders: PT Eval and Treat   Medical Diagnosis from Referral: Dorsalgia             Evaluation Date: 1/25/2024  Authorization Period Expiration: 12/27/24  Plan of Care Expiration: 4/18/24  Visit # / Visits authorized: 2/25    PTA Visit #: 1/5     Time In: 3:55 pm  Time Out: 4:37 pm   Total Billable Time: 42 minutes    SUBJECTIVE     Pt reports: No pain at the moment.  She  N/A  compliant with home exercise program.  Response to previous treatment: first treatment after evaluation   Functional change: none     Pain: 0/10  Location: bilateral back      OBJECTIVE     Objective Measures updated at progress report unless specified.     Treatment     Stephanie received the treatments listed below:      therapeutic exercises to develop strength, endurance, ROM, posture, and core stabilization for 12 minutes including:  Bike 5 minutes  Slant board 3 x 30 second hold   Hamstring stretch 3 x 30 second hold   Hip flexor stretch     manual therapy techniques: Joint mobilizations, Manual traction, Soft tissue Mobilization, and Friction Massage were applied to the: - for - minutes, including:    neuromuscular re-education activities to improve: Balance, Coordination, Proprioception, and Posture for 30 minutes. The following activities were included:  Supine bend knee fall out 2 x 10   Dolphins 2 x 10 blue theraband   Prone bent knee abduction 2 x 10 blue theraband   Resisted hip extension 2 x 10   Multifidus towel 2 x 10         Patient Education and Home Exercises     Home Exercises Provided and Patient Education Provided     Education provided:   - continue to do METs diligently     Written Home Exercises Provided: Patient instructed to  cont prior HEP. Exercises were reviewed and Stephanie was able to demonstrate them prior to the end of the session.  Stephanie demonstrated good  understanding of the education provided. See EMR under Patient Instructions for exercises provided during therapy sessions    ASSESSMENT     Case conference with Renata Diaz DPT. Patient was informed on correct body mechanics to perform exercises correctly. Patient did well and did not feel increased pain with exercises. Will continue to progress patient as tolerated.     Stephanie Is progressing towards her goals.   Pt prognosis is Good.     Pt will continue to benefit from skilled outpatient physical therapy to address the deficits listed in the problem list box on initial evaluation, provide pt/family education and to maximize pt's level of independence in the home and community environment.     Pt's spiritual, cultural and educational needs considered and pt agreeable to plan of care and goals.     Anticipated barriers to physical therapy: none     Goals:   Short Term Goals: 6 weeks   Patient will report not feeling a catch or sharp pain with any lumbar movement  Patient will improve manual muscle test to 4+/5 for bilateral lower extremities   Patient will maintain prolonged positions x 15 minutes with 0 /10 pain     Long Term Goals: 12 weeks   Patient will be independent with home exercise program by D/C  Patient will improve manual muscle test to 5/5  Patient will maintain prolonged positions x 30 minutes with  0 /10 pain    PLAN     Plan of care Certification: 1/25/2024 to 4/18/24.     Outpatient Physical Therapy 2 times weekly for 12 weeks to include the following interventions: Aquatic Therapy, Hybresis with Dex, Cervical/Lumbar Traction, Electrical Stimulation IFC/Premod, Gait Training, Manual Therapy, Moist Heat/ Ice, Neuromuscular Re-ed, Patient Education, Self Care, Therapeutic Activities, Therapeutic Exercise, Ultrasound, and Dry Needling.   Rashmi Funk, PTA    01/30/2024

## 2024-02-01 ENCOUNTER — CLINICAL SUPPORT (OUTPATIENT)
Dept: REHABILITATION | Facility: HOSPITAL | Age: 24
End: 2024-02-01
Payer: COMMERCIAL

## 2024-02-01 DIAGNOSIS — R52 PAIN: Primary | ICD-10-CM

## 2024-02-01 DIAGNOSIS — R53.1 DECREASED STRENGTH: ICD-10-CM

## 2024-02-01 PROCEDURE — 97112 NEUROMUSCULAR REEDUCATION: CPT

## 2024-02-01 PROCEDURE — 97110 THERAPEUTIC EXERCISES: CPT

## 2024-02-01 PROCEDURE — 97140 MANUAL THERAPY 1/> REGIONS: CPT

## 2024-02-01 NOTE — PROGRESS NOTES
[facility]   Physical Therapy Treatment Note     Name: Stephanie Cisse  Clinic Number: [id number]    Therapy Diagnosis:   Encounter Diagnoses   Name Primary?    Pain Yes    Decreased strength      Physician: George Cantu MD    Visit Date: 2/1/2024    Physician Orders: PT Eval and Treat   Medical Diagnosis from Referral: Dorsalgia             Evaluation Date: 1/25/2024  Authorization Period Expiration: 12/27/24  Plan of Care Expiration: 4/18/24  Visit # / Visits authorized: 3/25    PTA Visit #: 1/5     Time In: 232 pm  Time Out: 316 pm   Total Billable Time:  44  minutes    SUBJECTIVE     Pt reports: still have some symptoms in the right low back     She  N/A  compliant with home exercise program.  Response to previous treatment: first treatment after evaluation   Functional change: none     Pain: 3/10  Location: bilateral back      OBJECTIVE     Objective Measures updated at progress report unless specified.     Treatment     Stephanie received the treatments listed below:      therapeutic exercises to develop strength, endurance, ROM, posture, and core stabilization for  10 minutes including:  Right piriformis stretch supine   Supine HS stretch right w/strap   Supine - right hip flexor /quad stretch    manual therapy techniques: Joint mobilizations, Manual traction, Soft tissue Mobilization, and Friction Massage were applied to the: for 9 minutes, including:  Right [place] mobilization Gr 5  Right long axis distraction w/MET  SL right manual innonimate posterior rotation Gr 4      neuromuscular re-education activities to improve: Balance, Coordination, Proprioception, and Posture for 25 minutes. The following activities were included:  Dolphins 3 x 10 gray theraband   Right SL clam gray TB x 30  Cybex hip extension left 7pl x 30, right leg 6pl x 30  Cybex hip flexion 4pl x 30  Cybex hip abduction 4pl x 30  Bridge w/lower leg extension with leg lowering x 10          (Not  today)  Prone bent knee abduction 2 x 10 blue theraband   Resisted hip extension 2 x 10   Multifidus towel 2 x 10   Supine bend knee fall out 2 x 10   (Not today)  Bike 5 minutes  Slant board 3 x 30 second hold   Hamstring stretch 3 x 30 second hold   Hip flexor stretch     Patient Education and Home Exercises     Home Exercises Provided and Patient Education Provided     Education provided:   - continue to do METs diligently     Written Home Exercises Provided: Patient instructed to cont prior HEP. Exercises were reviewed and Stephanie was able to demonstrate them prior to the end of the session.  Stephanie demonstrated good  understanding of the education provided. See EMR under Patient Instructions for exercises provided during therapy sessions    ASSESSMENT     Patient did not have any back pain when leaving the session. Completed manual to right innonimate into posterior rotation with good results and pain relief. Patient attempted standing multifidus exercises but the neck began hurting and the exercise was stopped. Focused on core/hip stabilization and stretching exercises. Will re-assess outcome next session.     Stephanie Is progressing towards her goals.   Pt prognosis is Good.     Pt will continue to benefit from skilled outpatient physical therapy to address the deficits listed in the problem list box on initial evaluation, provide pt/family education and to maximize pt's level of independence in the home and community environment.     Pt's spiritual, cultural and educational needs considered and pt agreeable to plan of care and goals.     Anticipated barriers to physical therapy: none     Goals:   Short Term Goals: 6 weeks   Patient will report not feeling a catch or sharp pain with any lumbar movement  Patient will improve manual muscle test to 4+/5 for bilateral lower extremities   Patient will maintain prolonged positions x 15 minutes with 0 /10 pain     Long Term Goals: 12 weeks   Patient will be independent  with home exercise program by D/C  Patient will improve manual muscle test to 5/5  Patient will maintain prolonged positions x 30 minutes with  0 /10 pain    PLAN     Plan of care Certification: 1/25/2024 to 4/18/24.     Outpatient Physical Therapy 2 times weekly for 12 weeks to include the following interventions: Aquatic Therapy, Hybresis with Dex, Cervical/Lumbar Traction, Electrical Stimulation IFC/Premod, Gait Training, Manual Therapy, Moist Heat/ Ice, Neuromuscular Re-ed, Patient Education, Self Care, Therapeutic Activities, Therapeutic Exercise, Ultrasound, and Dry Needling.   RAMON PRETTY, PT   02/01/2024

## 2024-02-06 ENCOUNTER — CLINICAL SUPPORT (OUTPATIENT)
Dept: REHABILITATION | Facility: HOSPITAL | Age: 24
End: 2024-02-06
Payer: COMMERCIAL

## 2024-02-06 DIAGNOSIS — R52 PAIN: Primary | ICD-10-CM

## 2024-02-06 DIAGNOSIS — R53.1 DECREASED STRENGTH: ICD-10-CM

## 2024-02-06 PROCEDURE — 97110 THERAPEUTIC EXERCISES: CPT | Mod: CQ

## 2024-02-06 PROCEDURE — 97112 NEUROMUSCULAR REEDUCATION: CPT | Mod: CQ

## 2024-02-06 NOTE — PROGRESS NOTES
[facility]   Physical Therapy Treatment Note     Name: Stephanie Cisse  Clinic Number: [id number]    Therapy Diagnosis:   Encounter Diagnoses   Name Primary?    Pain Yes    Decreased strength      Physician: George Cantu MD    Visit Date: 2/6/2024    Physician Orders: PT Eval and Treat   Medical Diagnosis from Referral: Dorsalgia             Evaluation Date: 1/25/2024  Authorization Period Expiration: 12/27/24  Plan of Care Expiration: 4/18/24  Visit # / Visits authorized: 4/25    PTA Visit #: 1/5     Time In: 4:00 pm  Time Out: 4:38 pm   Total Billable Time:  38  minutes    SUBJECTIVE     Pt reports: No pain right now.    She  N/A  compliant with home exercise program.  Response to previous treatment: first treatment after evaluation   Functional change: none     Pain: 0/10  Location: bilateral back      OBJECTIVE     Objective Measures updated at progress report unless specified.     Treatment     Stephanie received the treatments listed below:      therapeutic exercises to develop strength, endurance, ROM, posture, and core stabilization for  10 minutes including:  Right piriformis stretch supine   Supine HS stretch right w/strap   Supine - right hip flexor /quad stretch    manual therapy techniques: Joint mobilizations, Manual traction, Soft tissue Mobilization, and Friction Massage were applied to the: for 9 minutes, including:  Right chicago mobilization Gr 5  Right long axis distraction w/MET  SL right manual innonimate posterior rotation Gr 4      neuromuscular re-education activities to improve: Balance, Coordination, Proprioception, and Posture for 28 minutes. The following activities were included:  Cybex back extension 3 x 10 3#  Dolphins 3 x 10 gray theraband   Right SL clam gray TB x 30  Cybex hip extension left 7pl x 30, right leg 6pl x 30  Cybex hip flexion 4pl x 30  Cybex hip abduction 4pl x 30  Bridge w/lower leg extension with leg lowering x 10          (Not  today)  Prone bent knee abduction 2 x 10 blue theraband   Resisted hip extension 2 x 10   Multifidus towel 2 x 10   Supine bend knee fall out 2 x 10   (Not today)  Bike 5 minutes  Slant board 3 x 30 second hold   Hamstring stretch 3 x 30 second hold   Hip flexor stretch     Patient Education and Home Exercises     Home Exercises Provided and Patient Education Provided     Education provided:   - continue to do METs diligently     Written Home Exercises Provided: Patient instructed to cont prior HEP. Exercises were reviewed and Stephanie was able to demonstrate them prior to the end of the session.  Stephanie demonstrated good  understanding of the education provided. See EMR under Patient Instructions for exercises provided during therapy sessions    ASSESSMENT     Patient did not have any back pain when arriving or leaving the session. Focused on core/hip stabilization and stretching exercises. Will re-assess outcome next session.     Stephanie Is progressing towards her goals.   Pt prognosis is Good.     Pt will continue to benefit from skilled outpatient physical therapy to address the deficits listed in the problem list box on initial evaluation, provide pt/family education and to maximize pt's level of independence in the home and community environment.     Pt's spiritual, cultural and educational needs considered and pt agreeable to plan of care and goals.     Anticipated barriers to physical therapy: none     Goals:   Short Term Goals: 6 weeks   Patient will report not feeling a catch or sharp pain with any lumbar movement  Patient will improve manual muscle test to 4+/5 for bilateral lower extremities   Patient will maintain prolonged positions x 15 minutes with 0 /10 pain     Long Term Goals: 12 weeks   Patient will be independent with home exercise program by D/C  Patient will improve manual muscle test to 5/5  Patient will maintain prolonged positions x 30 minutes with  0 /10 pain    PLAN     Plan of care  Certification: 1/25/2024 to 4/18/24.     Outpatient Physical Therapy 2 times weekly for 12 weeks to include the following interventions: Aquatic Therapy, Hybresis with Dex, Cervical/Lumbar Traction, Electrical Stimulation IFC/Premod, Gait Training, Manual Therapy, Moist Heat/ Ice, Neuromuscular Re-ed, Patient Education, Self Care, Therapeutic Activities, Therapeutic Exercise, Ultrasound, and Dry Needling.   Rashmi Funk, NATHALIA     02/06/2024        Patient did not return after session. Reason Unknown. Please consider this note a discharge from physical therapy. Thank you for this referral!  Renata Diaz DPT, MTC

## 2024-03-01 PROBLEM — R53.1 DECREASED STRENGTH: Status: RESOLVED | Noted: 2024-01-26 | Resolved: 2024-03-01

## 2024-03-01 PROBLEM — R52 PAIN: Status: RESOLVED | Noted: 2024-01-26 | Resolved: 2024-03-01

## 2024-09-16 ENCOUNTER — TELEPHONE (OUTPATIENT)
Dept: OBSTETRICS AND GYNECOLOGY | Facility: CLINIC | Age: 24
End: 2024-09-16
Payer: COMMERCIAL

## 2024-09-16 DIAGNOSIS — Z36.89 ENCOUNTER TO ESTABLISH GESTATIONAL AGE USING ULTRASOUND: Primary | ICD-10-CM

## 2024-09-16 NOTE — TELEPHONE ENCOUNTER
----- Message from Michelle Thornton RN sent at 9/16/2024  1:38 PM CDT -----  Regarding: FW: Initial Pregnancy Visit  Will you get her an Initial OB appt scheduled please?  ----- Message -----  From: Kathe Lua  Sent: 9/16/2024  11:00 AM CDT  To: [facility] Staff  Subject: Initial Pregnancy Visit                          Good morning,     I am writing from the referral team. This morning in answering incoming calls, the pt called in requesting an appt with Dr. Tavera re: an initial pregnancy visit? Can you please give her a call back to schedule @ [phone]? Thank you!

## 2024-09-27 ENCOUNTER — TELEPHONE (OUTPATIENT)
Dept: OBSTETRICS AND GYNECOLOGY | Facility: CLINIC | Age: 24
End: 2024-09-27
Payer: COMMERCIAL

## 2024-09-27 NOTE — TELEPHONE ENCOUNTER
----- Message from [facility] sent at 9/26/2024  8:40 AM CDT -----  Regarding: Call Back - Appt  Who Called: Stephanie Cisse    Caller is requesting assistance/information from provider's office.      Preferred Method of Contact: Phone Call  Patient's Preferred Phone Number on File: [phone]   Best Call Back Number, if different:  Additional Information: Pt would like a call back concerning 09/30 appt.

## 2024-10-07 ENCOUNTER — INITIAL PRENATAL (OUTPATIENT)
Dept: OBSTETRICS AND GYNECOLOGY | Facility: CLINIC | Age: 24
End: 2024-10-07
Payer: COMMERCIAL

## 2024-10-07 ENCOUNTER — HOSPITAL ENCOUNTER (OUTPATIENT)
Dept: RADIOLOGY | Facility: HOSPITAL | Age: 24
Discharge: HOME | End: 2024-10-07
Attending: STUDENT IN AN ORGANIZED HEALTH CARE EDUCATION/TRAINING PROGRAM
Payer: COMMERCIAL

## 2024-10-07 VITALS
WEIGHT: 146 LBS | HEART RATE: 73 BPM | SYSTOLIC BLOOD PRESSURE: 98 MMHG | BODY MASS INDEX: 22.87 KG/M2 | DIASTOLIC BLOOD PRESSURE: 45 MMHG

## 2024-10-07 DIAGNOSIS — O34.219 HISTORY OF CESAREAN DELIVERY, CURRENTLY PREGNANT: Primary | ICD-10-CM

## 2024-10-07 DIAGNOSIS — Z36.89 ENCOUNTER TO ESTABLISH GESTATIONAL AGE USING ULTRASOUND: ICD-10-CM

## 2024-10-07 DIAGNOSIS — Z3A.01 7 WEEKS GESTATION OF PREGNANCY: ICD-10-CM

## 2024-10-07 DIAGNOSIS — Z11.3 SCREEN FOR STD (SEXUALLY TRANSMITTED DISEASE): ICD-10-CM

## 2024-10-07 PROCEDURE — 87491 CHLMYD TRACH DNA AMP PROBE: CPT | Mod: ,,, | Performed by: CLINICAL MEDICAL LABORATORY

## 2024-10-07 PROCEDURE — 99213 OFFICE O/P EST LOW 20 MIN: CPT | Mod: PBBFAC,25 | Performed by: STUDENT IN AN ORGANIZED HEALTH CARE EDUCATION/TRAINING PROGRAM

## 2024-10-07 PROCEDURE — 80307 DRUG TEST PRSMV CHEM ANLYZR: CPT | Mod: ,,, | Performed by: CLINICAL MEDICAL LABORATORY

## 2024-10-07 PROCEDURE — 99999 PR PBB SHADOW E&M-EST. PATIENT-LVL III: CPT | Mod: PBBFAC,,, | Performed by: STUDENT IN AN ORGANIZED HEALTH CARE EDUCATION/TRAINING PROGRAM

## 2024-10-07 PROCEDURE — 87086 URINE CULTURE/COLONY COUNT: CPT | Mod: ,,, | Performed by: CLINICAL MEDICAL LABORATORY

## 2024-10-07 PROCEDURE — 0501F PRENATAL FLOW SHEET: CPT | Mod: S$PBB,,, | Performed by: STUDENT IN AN ORGANIZED HEALTH CARE EDUCATION/TRAINING PROGRAM

## 2024-10-07 PROCEDURE — 87661 TRICHOMONAS VAGINALIS AMPLIF: CPT | Mod: ,,, | Performed by: CLINICAL MEDICAL LABORATORY

## 2024-10-07 PROCEDURE — 76801 OB US < 14 WKS SINGLE FETUS: CPT | Mod: TC

## 2024-10-07 PROCEDURE — 87591 N.GONORRHOEAE DNA AMP PROB: CPT | Mod: ,,, | Performed by: CLINICAL MEDICAL LABORATORY

## 2024-10-07 PROCEDURE — 76801 OB US < 14 WKS SINGLE FETUS: CPT | Mod: 26,,, | Performed by: RADIOLOGY

## 2024-10-08 LAB
CHLAMYDIA BY PCR: NEGATIVE
N. GONORRHOEAE (GC) BY PCR: NEGATIVE
TRICHOMONAS NAT: NEGATIVE

## 2024-10-09 LAB — UA COMPLETE W REFLEX CULTURE PNL UR: NORMAL

## 2024-10-10 PROBLEM — Z30.431 IUD CHECK UP: Status: RESOLVED | Noted: 2022-12-13 | Resolved: 2024-10-10

## 2024-10-10 LAB
AMPHET UR QL SCN: NEGATIVE
BARBITURATES UR QL SCN: NEGATIVE
BENZODIAZ METAB UR QL SCN: NEGATIVE
CANNABINOIDS UR QL SCN: NEGATIVE
COCAINE UR QL SCN: NEGATIVE
OPIATES UR QL SCN: NEGATIVE
PCP UR QL SCN: NEGATIVE

## 2024-10-10 NOTE — PROGRESS NOTES
New OB History and Physical    CC:   Chief Complaint   Patient presents with    Initial Prenatal Visit     No problems           Assessment/Plan:   Stephanie Cisse is a 24 y.o. at 7w2d who presents for new OB visit    Problem List Items Addressed This Visit    None  Visit Diagnoses       History of  delivery, currently pregnant    -  Primary    7 weeks gestation of pregnancy        Relevant Orders    Varicella Zoster Antibody, IgG (Completed)    Hepatitis C Antibody (Completed)    OB Drug Screen Definitive, Urine    HIV 1/2 Ag/Ab (4th Gen) (Completed)    Type & Screen (Completed)    Urine culture (Completed)    Treponema Pallidum (Syphillis) Antibody (Completed)    Rubella Antibody Screen (Completed)    Hepatitis B Surface Antigen (Completed)    Basic Metabolic Panel (Completed)    CBC Auto Differential (Completed)    Drug Screen, Urine (Completed)    Screen for STD (sexually transmitted disease)        Relevant Orders    Trichomonas vaginalis by PCR (Completed)    Chlamydia/GC, PCR (Completed)    Drug Screen, Urine (Completed)            HPI: Stephanie Cisse is a 24 y.o. at 7w2d who presents for new OB visit.       Review of Systems: The following ROS was otherwise negative, except as noted in the HPI:  constitutional, HEENT, respiratory, cardiovascular, gastrointestinal, genitourinary, skin, musculoskeletal, neurological, psych    Gynecologic History: Denies hx of abnl pap smears.  No hx of STIs.    Obstetrical History:  OB History          2    Para   1    Term   1       0    AB   0    Living   1         SAB   0    IAB   0    Ectopic   0    Multiple   0    Live Births   1                 Past Medical History:   Past Medical History:   Diagnosis Date    Acne     Hypoglycemia        Medications:  Medication List with Changes/Refills   Current Medications    ALPRAZOLAM (XANAX) 0.5 MG TABLET    Take 1 tablet (0.5 mg total) by mouth once. for 1 dose    ATOMOXETINE (STRATTERA) 40 MG CAPSULE     Take 40 mg by mouth every morning.    CEPHALEXIN (KEFLEX) 500 MG CAPSULE    Take 500 mg by mouth every 6 (six) hours.    GABAPENTIN (NEURONTIN) 300 MG CAPSULE    Take 1 capsule (300 mg total) by mouth 2 (two) times daily.    MISOPROSTOL (CYTOTEC) 200 MCG TAB    Take 1 tablet (200 mcg total) by mouth once. for 1 dose   Discontinued Medications    PEDIATRIC MULTIVIT-IRON-MIN (FLINTSTONES COMPLETE, IRON,) CHEW    Take 1 tablet by mouth once daily.         Allergies:  Acetaminophen, Oxycodone hcl, and Percocet [oxycodone-acetaminophen]    Surgical History:  Past Surgical History:   Procedure Laterality Date     SECTION N/A 2022    Procedure:  SECTION;  Surgeon: Boby Tavera DO;  Location: [facility] L&D;  Service: OB/GYN;  Laterality: N/A;       Family History:  Family History   Problem Relation Name Age of Onset    No Known Problems Father      Heart disease Mother      Stroke Mother      Protein S deficiency Mother      Clotting disorder Mother      No Known Problems Brother      No Known Problems Sister      No Known Problems Brother      Clotting disorder Sister      No Known Problems Sister         Social History:  Social History     Substance and Sexual Activity   Alcohol Use Never     Social History     Substance and Sexual Activity   Drug Use Not Currently     Social History     Tobacco Use   Smoking Status Never   Smokeless Tobacco Never       Physical Exam:  BP (!) 98/45   Pulse 73   Wt 66.2 kg (146 lb)   LMP 2024   BMI 22.87 kg/m²     General: Alert, well appearing, no acute distress  Head: Normocephalic, atraumatic  Lungs: unlabored respirations  Abdomen: Gravid, soft, nontender   Pelvic: deferred  Extremities: No redness or tenderness  Skin: Well perfused, normal coloration and turgor, no lesions or rashes visualized  Neuro: Alert, oriented, normal speech, no focal deficits, moves extremities appropriately  Psych: Appropriate, normal affect, appears stated  age  Osteopathic: No TART changes      Reviewed frequency of appointments for routine prenatal care, call group partners.  Pregnancy book given, encouraged to read through for questions regarding food/drink and medication safety in pregnancy.  Encouraged Mychart access.  Instructed to stop by the lab after visit for NOB labwork.    Breastfeeding  - Discussed benefits of breastfeeding for mother and baby and limitations of formula  - Educated mother on milk and milk production  - Encouraged to feed infant on demand  - Needs 8-12 feeds in 24 hrs  - Does not need to go more then 4-5 hours with out a feed  - Reviewed feeding cues, feeding patterns and positions  - Encouraged patient to review pregnancy guide for additional information

## 2024-10-18 ENCOUNTER — TELEPHONE (OUTPATIENT)
Dept: OBSTETRICS AND GYNECOLOGY | Facility: CLINIC | Age: 24
End: 2024-10-18
Payer: COMMERCIAL

## 2024-10-18 NOTE — TELEPHONE ENCOUNTER
----- Message from Med Assistant Edmonds sent at 10/18/2024  8:51 AM CDT -----  Who Called: Stephanie Cisse    Caller is requesting assistance/information from provider's office.      Preferred Method of Contact: Phone Call  Patient's Preferred Phone Number on File: [phone]   Best Call Back Number, if different:  Additional Information: pt never got appt for 9wk gender test

## 2024-10-29 ENCOUNTER — TELEPHONE (OUTPATIENT)
Dept: OBSTETRICS AND GYNECOLOGY | Facility: CLINIC | Age: 24
End: 2024-10-29
Payer: COMMERCIAL

## 2024-11-04 ENCOUNTER — ROUTINE PRENATAL (OUTPATIENT)
Dept: OBSTETRICS AND GYNECOLOGY | Facility: CLINIC | Age: 24
End: 2024-11-04
Payer: COMMERCIAL

## 2024-11-04 VITALS
DIASTOLIC BLOOD PRESSURE: 54 MMHG | BODY MASS INDEX: 23.18 KG/M2 | WEIGHT: 148 LBS | SYSTOLIC BLOOD PRESSURE: 106 MMHG | HEART RATE: 74 BPM

## 2024-11-04 DIAGNOSIS — O34.219 HISTORY OF CESAREAN DELIVERY, CURRENTLY PREGNANT: Primary | ICD-10-CM

## 2024-11-04 DIAGNOSIS — Z3A.11 11 WEEKS GESTATION OF PREGNANCY: ICD-10-CM

## 2024-11-04 DIAGNOSIS — R35.0 FREQUENCY OF URINATION: ICD-10-CM

## 2024-11-04 LAB
BILIRUB SERPL-MCNC: NORMAL MG/DL
BLOOD URINE, POC: NORMAL
CLARITY, UA: NORMAL
COLOR, UA: NORMAL
GLUCOSE UR QL STRIP: NORMAL
KETONES UR QL STRIP: NORMAL
LEUKOCYTE ESTERASE URINE, POC: NORMAL
NITRITE, POC UA: NORMAL
PH, POC UA: 6
PROTEIN, POC: NORMAL
SPECIFIC GRAVITY, POC UA: 1.03
UROBILINOGEN, POC UA: 0.2

## 2024-11-04 PROCEDURE — 99213 OFFICE O/P EST LOW 20 MIN: CPT | Mod: PBBFAC | Performed by: STUDENT IN AN ORGANIZED HEALTH CARE EDUCATION/TRAINING PROGRAM

## 2024-11-04 PROCEDURE — 0502F SUBSEQUENT PRENATAL CARE: CPT | Mod: CPTII,,, | Performed by: STUDENT IN AN ORGANIZED HEALTH CARE EDUCATION/TRAINING PROGRAM

## 2024-11-04 PROCEDURE — 81003 URINALYSIS AUTO W/O SCOPE: CPT | Mod: PBBFAC | Performed by: STUDENT IN AN ORGANIZED HEALTH CARE EDUCATION/TRAINING PROGRAM

## 2024-11-04 PROCEDURE — 99999 PR PBB SHADOW E&M-EST. PATIENT-LVL III: CPT | Mod: PBBFAC,,, | Performed by: STUDENT IN AN ORGANIZED HEALTH CARE EDUCATION/TRAINING PROGRAM

## 2024-11-04 PROCEDURE — 99999PBSHW POCT URINALYSIS W/O SCOPE: Mod: PBBFAC,,,

## 2024-11-04 NOTE — PROGRESS NOTES
Return OB Visit    24 y.o. female  at 11w2d   She has no complaints.  Denies any vaginal bleeding, leakage of fluid, cramping, contractions, or pressure.   Total weight gain/weight loss in pregnancy: Not found.     Vitals  BP: (!) 106/54  Pulse: 74  Weight: 67.1 kg (148 lb)  Prenatal  Fetal Heart Rate: 150    Prenatal Labs:  Lab Results   Component Value Date    GROUPTRH O POS 10/07/2024    HGB 12.8 10/07/2024    HCT 36.9 (L) 10/07/2024     10/07/2024    HEPBSAG Non-Reactive 10/07/2024    [id number] Non-Reactive 10/07/2024    LABNGO Negative 10/07/2024    LABURIN Skin/Urogenital Alexa Isolated, no further workup. 10/07/2024       A: 11w2d           ICD-10-CM ICD-9-CM    1. History of  delivery, currently pregnant  O34.219 654.20       2. Frequency of urination  R35.0 788.41 POCT URINALYSIS W/O SCOPE      3. 11 weeks gestation of pregnancy  Z3A.11 V22.2           P: Bleeding, daily fetal kick counts, and  labor/labor precautions discussed.    No pregnancy checklist tasks were completed during this visit, and no tasks are pending completion.      Orders Placed This Encounter   Procedures    POCT URINALYSIS W/O SCOPE       Questions answered to desired level of satisfaction  Verbalized understanding to all information and instructions provided.  Follow up in about 4 weeks (around 2024) for ROB. Alyse Boswell, DO [facility]

## 2024-11-26 ENCOUNTER — HOSPITAL ENCOUNTER (EMERGENCY)
Facility: HOSPITAL | Age: 24
Discharge: HOME | End: 2024-11-26
Payer: COMMERCIAL

## 2024-11-26 VITALS
HEART RATE: 81 BPM | SYSTOLIC BLOOD PRESSURE: 93 MMHG | TEMPERATURE: 98 F | WEIGHT: 148 LBS | OXYGEN SATURATION: 99 % | DIASTOLIC BLOOD PRESSURE: 58 MMHG | HEIGHT: 67 IN | RESPIRATION RATE: 18 BRPM | BODY MASS INDEX: 23.23 KG/M2

## 2024-11-26 DIAGNOSIS — O26.899 ABDOMINAL PAIN AFFECTING PREGNANCY: ICD-10-CM

## 2024-11-26 DIAGNOSIS — R10.9 ABDOMINAL PAIN AFFECTING PREGNANCY: ICD-10-CM

## 2024-11-26 LAB
ALBUMIN SERPL BCP-MCNC: 3.4 G/DL (ref 3.5–5)
ALBUMIN/GLOB SERPL: 1.1 {RATIO}
ALP SERPL-CCNC: 42 U/L (ref 40–150)
ALT SERPL W P-5'-P-CCNC: 9 U/L
ANION GAP SERPL CALCULATED.3IONS-SCNC: 7 MMOL/L (ref 7–16)
AST SERPL W P-5'-P-CCNC: 19 U/L (ref 5–34)
BASOPHILS # BLD AUTO: 0.03 K/UL (ref 0–0.2)
BASOPHILS NFR BLD AUTO: 0.3 % (ref 0–1)
BILIRUB SERPL-MCNC: 0.6 MG/DL
BILIRUB UR QL STRIP: NEGATIVE
BUN SERPL-MCNC: 7 MG/DL (ref 7–19)
BUN/CREAT SERPL: 11 (ref 6–20)
CALCIUM SERPL-MCNC: 8.8 MG/DL (ref 8.4–10.2)
CHLORIDE SERPL-SCNC: 107 MMOL/L (ref 98–107)
CLARITY UR: CLEAR
CO2 SERPL-SCNC: 26 MMOL/L (ref 22–29)
COLOR UR: NORMAL
CREAT SERPL-MCNC: 0.64 MG/DL (ref 0.55–1.02)
DIFFERENTIAL METHOD BLD: ABNORMAL
EGFR (NO RACE VARIABLE) (RUSH/TITUS): 127 ML/MIN/1.73M2
EOSINOPHIL # BLD AUTO: 0.05 K/UL (ref 0–0.5)
EOSINOPHIL NFR BLD AUTO: 0.4 % (ref 1–4)
ERYTHROCYTE [DISTWIDTH] IN BLOOD BY AUTOMATED COUNT: 12.5 % (ref 11.5–14.5)
GLOBULIN SER-MCNC: 3.2 G/DL (ref 2–4)
GLUCOSE SERPL-MCNC: 72 MG/DL (ref 74–100)
GLUCOSE UR STRIP-MCNC: NORMAL MG/DL
HCT VFR BLD AUTO: 37.4 % (ref 38–47)
HGB BLD-MCNC: 12.8 G/DL (ref 12–16)
IMM GRANULOCYTES # BLD AUTO: 0.07 K/UL (ref 0–0.04)
IMM GRANULOCYTES NFR BLD: 0.6 % (ref 0–0.4)
KETONES UR STRIP-SCNC: NEGATIVE MG/DL
LEUKOCYTE ESTERASE UR QL STRIP: NEGATIVE
LIPASE SERPL-CCNC: 18 U/L
LYMPHOCYTES # BLD AUTO: 1.71 K/UL (ref 1–4.8)
LYMPHOCYTES NFR BLD AUTO: 14.7 % (ref 27–41)
MAGNESIUM SERPL-MCNC: 1.8 MG/DL (ref 1.6–2.6)
MCH RBC QN AUTO: 30.3 PG (ref 27–31)
MCHC RBC AUTO-ENTMCNC: 34.2 G/DL (ref 32–36)
MCV RBC AUTO: 88.4 FL (ref 80–96)
MONOCYTES # BLD AUTO: 0.68 K/UL (ref 0–0.8)
MONOCYTES NFR BLD AUTO: 5.8 % (ref 2–6)
MPC BLD CALC-MCNC: 10.9 FL (ref 9.4–12.4)
NEUTROPHILS # BLD AUTO: 9.09 K/UL (ref 1.8–7.7)
NEUTROPHILS NFR BLD AUTO: 78.2 % (ref 53–65)
NITRITE UR QL STRIP: NEGATIVE
NRBC # BLD AUTO: 0 X10E3/UL
NRBC, AUTO (.00): 0 %
PH UR STRIP: 5.5 PH UNITS
PLATELET # BLD AUTO: 187 K/UL (ref 150–400)
POTASSIUM SERPL-SCNC: 4.1 MMOL/L (ref 3.5–5.1)
PROT SERPL-MCNC: 6.6 G/DL (ref 6.4–8.3)
PROT UR QL STRIP: NEGATIVE
RBC # BLD AUTO: 4.23 M/UL (ref 4.2–5.4)
RBC # UR STRIP: NEGATIVE /UL
SODIUM SERPL-SCNC: 136 MMOL/L (ref 136–145)
SP GR UR STRIP: 1.01
UROBILINOGEN UR STRIP-ACNC: NORMAL MG/DL
WBC # BLD AUTO: 11.63 K/UL (ref 4.5–11)

## 2024-11-26 PROCEDURE — 36415 COLL VENOUS BLD VENIPUNCTURE: CPT | Performed by: NURSE PRACTITIONER

## 2024-11-26 PROCEDURE — 81003 URINALYSIS AUTO W/O SCOPE: CPT | Performed by: NURSE PRACTITIONER

## 2024-11-26 PROCEDURE — 85025 COMPLETE CBC W/AUTO DIFF WBC: CPT | Performed by: NURSE PRACTITIONER

## 2024-11-26 PROCEDURE — 83690 ASSAY OF LIPASE: CPT | Performed by: NURSE PRACTITIONER

## 2024-11-26 PROCEDURE — 83735 ASSAY OF MAGNESIUM: CPT | Performed by: NURSE PRACTITIONER

## 2024-11-26 PROCEDURE — 80053 COMPREHEN METABOLIC PANEL: CPT | Performed by: NURSE PRACTITIONER

## 2024-11-26 PROCEDURE — 99284 EMERGENCY DEPT VISIT MOD MDM: CPT | Mod: 25

## 2024-11-26 NOTE — Clinical Note
"Stephanie Blevinspatricio Cisse was seen and treated in our emergency department on 11/26/2024.  She may return to work on 11/27/2024.       If you have any questions or concerns, please don't hesitate to call.      Huber Joel, NP"

## 2024-11-26 NOTE — DISCHARGE INSTRUCTIONS
Follow up with Dr. Tavera as scheduled on Monday. Frequent rest and drink plenty of fluids. Return to the emergency department for new or worsening symptoms.

## 2024-11-26 NOTE — ED PROVIDER NOTES
Encounter Date: 2024       History     Chief Complaint   Patient presents with    Abdominal Pain     Pt presents to ed with c/o having abdominal cramping while 15 weeks pregnant. Patient had hx of POTS and blacked out before the cramping started     23 y/o WF presents to the emergency department with c/o abd pain and cramping that she states started today. She also reports hx of POTS and states has been passing out more than usual. She states that the pain is sharp. She states that it starts on the right side and shoots across the bottom of her abdomen. She denies dysuria, hematuria, vaginal bleeding or discharge. She denies change in bowel habits. She has had no fever, chills, nausea, vomiting. She has had no treatment for her symptoms. She knows of no particular exacerbating or remitting factors.     The history is provided by the patient.     Review of patient's allergies indicates:   Allergen Reactions    Acetaminophen     Oxycodone hcl     Percocet [oxycodone-acetaminophen] Itching     Past Medical History:   Diagnosis Date    Acne     Hypoglycemia      Past Surgical History:   Procedure Laterality Date     SECTION N/A 2022    Procedure:  SECTION;  Surgeon: Boby Tavera DO;  Location: [facility] L&D;  Service: OB/GYN;  Laterality: N/A;     Family History   Problem Relation Name Age of Onset    No Known Problems Father      Heart disease Mother      Stroke Mother      Protein S deficiency Mother      Clotting disorder Mother      No Known Problems Brother      No Known Problems Sister      No Known Problems Brother      Clotting disorder Sister      No Known Problems Sister       Social History     Tobacco Use    Smoking status: Never    Smokeless tobacco: Never   Substance Use Topics    Alcohol use: Never    Drug use: Not Currently     Review of Systems   All other systems reviewed and are negative.      Physical Exam     Initial Vitals [24 1306]   BP Pulse Resp Temp  SpO2   107/72 80 14 98 °F (36.7 °C) 99 %      MAP       --         Physical Exam    Constitutional: She appears well-developed and well-nourished. She is cooperative.  Non-toxic appearance.   Cardiovascular:  Normal rate, regular rhythm and normal heart sounds.           Pulmonary/Chest: Effort normal and breath sounds normal.   Abdominal: Abdomen is soft. Bowel sounds are normal. There is no abdominal tenderness.     Neurological: She is alert and oriented to person, place, and time. She has normal strength. GCS eye subscore is 4. GCS verbal subscore is 5. GCS motor subscore is 6.   Skin: Skin is warm, dry and intact. Capillary refill takes less than 2 seconds.         Medical Screening Exam   See Full Note    ED Course   Procedures  Labs Reviewed   COMPREHENSIVE METABOLIC PANEL - Abnormal       Result Value    Sodium 136      Potassium 4.1      Chloride 107      CO2 26      Anion Gap 7      Glucose 72 (*)     BUN 7      Creatinine 0.64      BUN/Creatinine Ratio 11      Calcium 8.8      Total Protein 6.6      Albumin 3.4 (*)     Globulin 3.2      A/G Ratio 1.1      Bilirubin, Total 0.6      Alk Phos 42      ALT 9      AST 19      eGFR 127     CBC WITH DIFFERENTIAL - Abnormal    WBC 11.63 (*)     RBC 4.23      Hemoglobin 12.8      Hematocrit 37.4 (*)     MCV 88.4      MCH 30.3      MCHC 34.2      RDW 12.5      Platelet Count 187      MPV 10.9      Neutrophils % 78.2 (*)     Lymphocytes % 14.7 (*)     Monocytes % 5.8      Eosinophils % 0.4 (*)     Basophils % 0.3      Immature Granulocytes % 0.6 (*)     nRBC, Auto 0.0      Neutrophils, Abs 9.09 (*)     Lymphocytes, Absolute 1.71      Monocytes, Absolute 0.68      Eosinophils, Absolute 0.05      Basophils, Absolute 0.03      Immature Granulocytes, Absolute 0.07 (*)     nRBC, Absolute 0.00      Diff Type Auto     LIPASE - Normal    Lipase 18     MAGNESIUM - Normal    Magnesium 1.8     CBC W/ AUTO DIFFERENTIAL    Narrative:     The following orders were created for  panel order CBC auto differential.  Procedure                               Abnormality         Status                     ---------                               -----------         ------                     CBC with Differential[[id number]]       Abnormal            Final result                 Please view results for these tests on the individual orders.   URINALYSIS, REFLEX TO URINE CULTURE    Color, UA Light-Yellow      Clarity, UA Clear      pH, UA 5.5      Leukocytes, UA Negative      Nitrites, UA Negative      Protein, UA Negative      Glucose, UA Normal      Ketones, UA Negative      Urobilinogen, UA Normal      Bilirubin, UA Negative      Blood, UA Negative      Specific Gravity, UA 1.012            Imaging Results              US OB Limited 1 Or More Gestations (Final result)  Result time 11/26/24 17:36:53   Procedure changed from US OB 14+ Wks, TransAbd, Single Gestation     Final result by Joan Choi MD (11/26/24 17:36:53)                   Impression:      Single viable 1st trimester intrauterine fetus with no abnormalities seen.    Place of service: [facility]      Electronically signed by: Joan Choi  Date:    11/26/2024  Time:    17:36               Narrative:    EXAMINATION:  US OB LIMITED 1 OR MORE GESTATIONS    CLINICAL HISTORY:  pelvic pain;  Other specified pregnancy related conditions, unspecified trimester    TECHNIQUE:  Transabdominal imaging was performed    COMPARISON:  10/07/2024    FINDINGS:  A single intrauterine fetus is present, in variable position.    The placenta is anterior and no subchorionic hemorrhage is seen.  The cervix measures 4.85 cm.  A normal amount of fluid is present within the gestational sac.  Fetal biometry was not performed but cardiac activity was documented with a heart rate of 154 BPM.                                       Medications - No data to display  Medical Decision Making  23 y/o WF presents to the emergency department with c/o abd  pain and cramping that she states started today. She also reports hx of POTS and states has been passing out more than usual. She states that the pain is sharp. She states that it starts on the right side and shoots across the bottom of her abdomen. She denies dysuria, hematuria, vaginal bleeding or discharge. She denies change in bowel habits. She has had no fever, chills, nausea, vomiting. She has had no treatment for her symptoms. She knows of no particular exacerbating or remitting factors.   Ordered and reviewed labs  Ordered and reviewed urinalysis  Reviewed orthostatic vital signs with negative tilt test  Ordered and reviewed OB ultrasound as well as radiologist's interpretation with results significant for single viable 1st trimester intrauterine fetus with no abnormalities seen.  Follow-up and return precautions discussed with patient, who verbalized understanding  Diagnosis: Abdominal pain affecting pregnancy      Problems Addressed:  Abdominal pain affecting pregnancy:     Details: Patient is afebrile, non toxic appearing. She has had no nausea, vomiting, diarrhea. She denies cough, shortness of breath, chest pain. She denies palpitations    Amount and/or Complexity of Data Reviewed  Labs: ordered.  Radiology: ordered.    Risk  OTC drugs.  Prescription drug management.                                      Clinical Impression:   Final diagnoses:  [O26.899, R10.9] Abdominal pain affecting pregnancy        ED Disposition Condition    Discharge Stable          ED Prescriptions    None       Follow-up Information    None          Huber Joel NP  11/26/24 1826       Huber Joel NP  11/26/24 1826

## 2024-11-27 ENCOUNTER — TELEPHONE (OUTPATIENT)
Dept: EMERGENCY MEDICINE | Facility: HOSPITAL | Age: 24
End: 2024-11-27
Payer: COMMERCIAL

## 2024-12-02 ENCOUNTER — ROUTINE PRENATAL (OUTPATIENT)
Dept: OBSTETRICS AND GYNECOLOGY | Facility: CLINIC | Age: 24
End: 2024-12-02
Payer: COMMERCIAL

## 2024-12-02 VITALS
WEIGHT: 146 LBS | DIASTOLIC BLOOD PRESSURE: 54 MMHG | SYSTOLIC BLOOD PRESSURE: 96 MMHG | HEART RATE: 90 BPM | BODY MASS INDEX: 22.87 KG/M2

## 2024-12-02 DIAGNOSIS — Z3A.15 15 WEEKS GESTATION OF PREGNANCY: ICD-10-CM

## 2024-12-02 DIAGNOSIS — G90.A POTS (POSTURAL ORTHOSTATIC TACHYCARDIA SYNDROME): ICD-10-CM

## 2024-12-02 DIAGNOSIS — O34.219 HISTORY OF CESAREAN DELIVERY, CURRENTLY PREGNANT: Primary | ICD-10-CM

## 2024-12-02 PROCEDURE — 99213 OFFICE O/P EST LOW 20 MIN: CPT | Mod: PBBFAC | Performed by: STUDENT IN AN ORGANIZED HEALTH CARE EDUCATION/TRAINING PROGRAM

## 2024-12-02 PROCEDURE — 99999 PR PBB SHADOW E&M-EST. PATIENT-LVL III: CPT | Mod: PBBFAC,,, | Performed by: STUDENT IN AN ORGANIZED HEALTH CARE EDUCATION/TRAINING PROGRAM

## 2024-12-02 PROCEDURE — 0502F SUBSEQUENT PRENATAL CARE: CPT | Mod: CPTII,,, | Performed by: STUDENT IN AN ORGANIZED HEALTH CARE EDUCATION/TRAINING PROGRAM

## 2024-12-18 ENCOUNTER — PATIENT MESSAGE (OUTPATIENT)
Dept: OBSTETRICS AND GYNECOLOGY | Facility: CLINIC | Age: 24
End: 2024-12-18
Payer: COMMERCIAL

## 2024-12-19 ENCOUNTER — TELEPHONE (OUTPATIENT)
Dept: OBSTETRICS AND GYNECOLOGY | Facility: CLINIC | Age: 24
End: 2024-12-19
Payer: COMMERCIAL

## 2024-12-19 NOTE — TELEPHONE ENCOUNTER
----- Message from Nurse Bowman sent at 12/18/2024  5:16 PM CST -----  Hasn't heard from [facility]  ----- Message -----  From: Zack Spangler, Patient Care Assistant  Sent: 12/16/2024   9:55 AM CST  To: [facility] Staff    Who Called: Stephanie Cisse    Caller is requesting assistance/information from provider's office.          Preferred Method of Contact: Phone Call  Patient's Preferred Phone Number on File: [phone]   Best Call Back Number, if different:  Additional Information: patient said that she was supposed to have gotten a referral to a high-risk doctor in [place] and she have not heard anything back concerning it.

## 2024-12-27 NOTE — PROGRESS NOTES
Return OB Visit    24 y.o. female  at 15w2d   She c/o syncopal episodes, unsure if related to POTS diagnosis. Denies any vaginal bleeding, leakage of fluid, cramping, contractions, or pressure.   Total weight gain/weight loss in pregnancy: Not found.     Vitals  BP: (!) 96/54  Pulse: 90  Weight: 66.2 kg (146 lb)  Edema  LLE Edema: None  RLE Edema: None  Facial: None  Additional Edema?: No  FHTs reassuring    Prenatal Labs:  Lab Results   Component Value Date    GROUPTRH O POS 10/07/2024    HGB 12.8 2024    HCT 37.4 (L) 2024     2024    HEPBSAG Non-Reactive 10/07/2024    [id number] Non-Reactive 10/07/2024    LABNGO Negative 10/07/2024    LABURIN Skin/Urogenital Alexa Isolated, no further workup. 10/07/2024       A: 18w6d           ICD-10-CM ICD-9-CM    1. History of  delivery, currently pregnant  O34.219 654.20       2. POTS (postural orthostatic tachycardia syndrome)  G90.A 427.89 Ambulatory referral/consult to Perinatology      3. 15 weeks gestation of pregnancy  Z3A.15 V22.2           P: Bleeding, daily fetal kick counts, and  labor/labor precautions discussed.    No pregnancy checklist tasks were completed during this visit, and no tasks are pending completion.      Orders Placed This Encounter   Procedures    Ambulatory referral/consult to Perinatology     Standing Status:   Future     Standing Expiration Date:   2026     Referral Priority:   Routine     Referral Type:   Consultation     Referral Reason:   Specialty Services Required     Referred to Provider:   Jed Lama III, MD     Requested Specialty:   Maternal and Fetal Medicine     Number of Visits Requested:   1       Questions answered to desired level of satisfaction  Verbalized understanding to all information and instructions provided.  Follow up in about 4 weeks (around 2024) for ROB. Alyse Boswell, DO [facility]

## 2025-01-06 ENCOUNTER — PATIENT MESSAGE (OUTPATIENT)
Dept: OBSTETRICS AND GYNECOLOGY | Facility: CLINIC | Age: 25
End: 2025-01-06
Payer: COMMERCIAL

## 2025-02-03 ENCOUNTER — ROUTINE PRENATAL (OUTPATIENT)
Dept: OBSTETRICS AND GYNECOLOGY | Facility: CLINIC | Age: 25
End: 2025-02-03
Payer: COMMERCIAL

## 2025-02-03 VITALS
WEIGHT: 146 LBS | HEART RATE: 92 BPM | SYSTOLIC BLOOD PRESSURE: 98 MMHG | BODY MASS INDEX: 22.87 KG/M2 | DIASTOLIC BLOOD PRESSURE: 60 MMHG

## 2025-02-03 DIAGNOSIS — R35.0 FREQUENCY OF URINATION: ICD-10-CM

## 2025-02-03 DIAGNOSIS — O34.219 HISTORY OF CESAREAN DELIVERY, CURRENTLY PREGNANT: ICD-10-CM

## 2025-02-03 DIAGNOSIS — M25.60 JOINT STIFFNESS: ICD-10-CM

## 2025-02-03 DIAGNOSIS — G90.A POTS (POSTURAL ORTHOSTATIC TACHYCARDIA SYNDROME): Primary | ICD-10-CM

## 2025-02-03 DIAGNOSIS — Z3A.24 24 WEEKS GESTATION OF PREGNANCY: ICD-10-CM

## 2025-02-03 LAB
BILIRUB SERPL-MCNC: NORMAL MG/DL
BLOOD URINE, POC: NORMAL
CLARITY, UA: NORMAL
COLOR, UA: NORMAL
GLUCOSE UR QL STRIP: NORMAL
KETONES UR QL STRIP: NORMAL
LEUKOCYTE ESTERASE URINE, POC: NORMAL
NITRITE, POC UA: NORMAL
PH, POC UA: 6.5
PROTEIN, POC: NORMAL
SPECIFIC GRAVITY, POC UA: 1.01
UROBILINOGEN, POC UA: 0.2

## 2025-02-03 PROCEDURE — 0502F SUBSEQUENT PRENATAL CARE: CPT | Mod: CPTII,,, | Performed by: STUDENT IN AN ORGANIZED HEALTH CARE EDUCATION/TRAINING PROGRAM

## 2025-02-03 PROCEDURE — 99213 OFFICE O/P EST LOW 20 MIN: CPT | Mod: PBBFAC | Performed by: STUDENT IN AN ORGANIZED HEALTH CARE EDUCATION/TRAINING PROGRAM

## 2025-02-03 PROCEDURE — 99999 PR PBB SHADOW E&M-EST. PATIENT-LVL III: CPT | Mod: PBBFAC,,, | Performed by: STUDENT IN AN ORGANIZED HEALTH CARE EDUCATION/TRAINING PROGRAM

## 2025-02-11 NOTE — PROGRESS NOTES
Return OB Visit    24 y.o. female  at 24w2d   She c/o none. Reviewed MFM progress note.  Reports good fetal movement or fluttering. Denies any vaginal bleeding, leakage of fluid, cramping, contractions, or pressure.   Total weight gain/weight loss in pregnancy: Not found.     Vitals  BP: 98/60  Pulse: 92  Weight: 66.2 kg (146 lb)  Prenatal  Fetal Heart Rate: 140  Movement: Present    Prenatal Labs:  Lab Results   Component Value Date    GROUPTRH O POS 10/07/2024    HGB 12.8 2024    HCT 37.4 (L) 2024     2024    HEPBSAG Non-Reactive 10/07/2024    [id number] Non-Reactive 10/07/2024    LABNGO Negative 10/07/2024    LABURIN Skin/Urogenital Alexa Isolated, no further workup. 10/07/2024       A: 24w2d           ICD-10-CM ICD-9-CM    1. POTS (postural orthostatic tachycardia syndrome)  G90.A 427.89 Ambulatory referral/consult to Cardiology      2. History of  delivery, currently pregnant  O34.219 654.20       3. 24 weeks gestation of pregnancy  Z3A.24 V22.2 Glucose, 1Hr Post Prandial      Treponema Pallidum (Syphillis) Antibody      CBC Auto Differential      4. Frequency of urination  R35.0 788.41 POCT URINALYSIS W/O SCOPE          P: Bleeding, daily fetal kick counts, and  labor/labor precautions discussed.    No pregnancy checklist tasks were completed during this visit, and no tasks are pending completion.      Orders Placed This Encounter   Procedures    Glucose, 1Hr Post Prandial     Standing Status:   Future     Standing Expiration Date:   2026    Treponema Pallidum (Syphillis) Antibody     Standing Status:   Future     Standing Expiration Date:   2026    CBC Auto Differential     Standing Status:   Future     Standing Expiration Date:   2026    Ambulatory referral/consult to Cardiology     Standing Status:   Future     Standing Expiration Date:   3/3/2026     Referral Priority:   Routine     Referral Type:   Consultation     Referral Reason:   Specialty  Services Required     Requested Specialty:   Cardiology     Number of Visits Requested:   1     Per MFM, will refer to cardio for POTS and neuro for joint stiffness and locking up  Questions answered to desired level of satisfaction  Verbalized understanding to all information and instructions provided.  Follow up in about 4 weeks (around 3/3/2025) for ROB. Alyse Boswell, DO [facility]

## 2025-02-17 ENCOUNTER — HOSPITAL ENCOUNTER (OUTPATIENT)
Dept: CARDIOLOGY | Facility: HOSPITAL | Age: 25
Discharge: HOME | End: 2025-02-17
Attending: STUDENT IN AN ORGANIZED HEALTH CARE EDUCATION/TRAINING PROGRAM
Payer: COMMERCIAL

## 2025-02-17 ENCOUNTER — ROUTINE PRENATAL (OUTPATIENT)
Dept: OBSTETRICS AND GYNECOLOGY | Facility: CLINIC | Age: 25
End: 2025-02-17
Payer: COMMERCIAL

## 2025-02-17 ENCOUNTER — OFFICE VISIT (OUTPATIENT)
Dept: CARDIOLOGY | Facility: CLINIC | Age: 25
End: 2025-02-17
Payer: COMMERCIAL

## 2025-02-17 VITALS
BODY MASS INDEX: 25.74 KG/M2 | DIASTOLIC BLOOD PRESSURE: 68 MMHG | HEART RATE: 98 BPM | HEIGHT: 67 IN | OXYGEN SATURATION: 97 % | SYSTOLIC BLOOD PRESSURE: 108 MMHG | WEIGHT: 164 LBS

## 2025-02-17 VITALS
DIASTOLIC BLOOD PRESSURE: 63 MMHG | BODY MASS INDEX: 25.69 KG/M2 | SYSTOLIC BLOOD PRESSURE: 118 MMHG | WEIGHT: 164 LBS | HEART RATE: 83 BPM

## 2025-02-17 DIAGNOSIS — R55 SYNCOPE AND COLLAPSE: ICD-10-CM

## 2025-02-17 DIAGNOSIS — Z3A.26 26 WEEKS GESTATION OF PREGNANCY: ICD-10-CM

## 2025-02-17 DIAGNOSIS — O34.219 HISTORY OF CESAREAN DELIVERY, CURRENTLY PREGNANT: ICD-10-CM

## 2025-02-17 DIAGNOSIS — G90.A POTS (POSTURAL ORTHOSTATIC TACHYCARDIA SYNDROME): Primary | ICD-10-CM

## 2025-02-17 DIAGNOSIS — G90.A POTS (POSTURAL ORTHOSTATIC TACHYCARDIA SYNDROME): ICD-10-CM

## 2025-02-17 DIAGNOSIS — R55 SYNCOPE AND COLLAPSE: Primary | ICD-10-CM

## 2025-02-17 DIAGNOSIS — R35.0 FREQUENCY OF URINATION: ICD-10-CM

## 2025-02-17 LAB
BILIRUB SERPL-MCNC: NORMAL MG/DL
BLOOD URINE, POC: NORMAL
CLARITY, UA: NORMAL
COLOR, UA: NORMAL
GLUCOSE UR QL STRIP: NORMAL
KETONES UR QL STRIP: NORMAL
LEUKOCYTE ESTERASE URINE, POC: NORMAL
NITRITE, POC UA: NORMAL
OHS QRS DURATION: 70 MS
OHS QTC CALCULATION: 436 MS
PH, POC UA: 6
PROTEIN, POC: NORMAL
SPECIFIC GRAVITY, POC UA: 1.02
UROBILINOGEN, POC UA: 0.2

## 2025-02-17 PROCEDURE — 93005 ELECTROCARDIOGRAM TRACING: CPT | Mod: PBBFAC | Performed by: STUDENT IN AN ORGANIZED HEALTH CARE EDUCATION/TRAINING PROGRAM

## 2025-02-17 PROCEDURE — 93246 EXT ECG>7D<15D RECORDING: CPT

## 2025-02-17 PROCEDURE — 99215 OFFICE O/P EST HI 40 MIN: CPT | Mod: PBBFAC,25 | Performed by: STUDENT IN AN ORGANIZED HEALTH CARE EDUCATION/TRAINING PROGRAM

## 2025-02-17 PROCEDURE — 0502F SUBSEQUENT PRENATAL CARE: CPT | Mod: CPTII,,, | Performed by: STUDENT IN AN ORGANIZED HEALTH CARE EDUCATION/TRAINING PROGRAM

## 2025-02-17 PROCEDURE — 99999 PR PBB SHADOW E&M-EST. PATIENT-LVL III: CPT | Mod: PBBFAC,,, | Performed by: STUDENT IN AN ORGANIZED HEALTH CARE EDUCATION/TRAINING PROGRAM

## 2025-02-17 PROCEDURE — 99213 OFFICE O/P EST LOW 20 MIN: CPT | Mod: PBBFAC,25 | Performed by: STUDENT IN AN ORGANIZED HEALTH CARE EDUCATION/TRAINING PROGRAM

## 2025-02-17 NOTE — PATIENT INSTRUCTIONS
Orthostatic vitals negative  Echo on -> March 10, 2025 at 1:00 pm  Heart monitory today   Follow-up in 3 months

## 2025-02-17 NOTE — PROGRESS NOTES
"PCP: No, Primary Doctor    Referring Provider: Boby Tavera DO  [address] [place] [id number]    Reason for referral: POTS    Subjective:   Stephanie Cisse is a 24 y.o. female with hx of suspected POTS who presents for a new patient visit.    Patient is currently 26 weeks pregnant.  Notes longstanding history of near syncope  (possible POTS) since she was in high school.  Symptoms were worse at the time of her previous pregnancy about 3 years ago.  Now the patient is pregnant again, she has worsening symptoms again- complaining of worsening lightheadedness with a recent syncopal episode on Sunday (which was preceded by palpitations/tachycardia).  Complains of intermittent chest pressure once a week.  She also endorses shortness of breath.Of note, she notes she saw a specialist in [place] at the time of her previous pregnancy for POTS.  At the time, tilt table test could not be performed as she was pregnant but her symptoms are found to be consistent with POTS.    Fhx:  Family history is not known-patient is adopted  Shx:  Never smoker    EKG 2/17/25:  Sinus tachycardia 104 beats per minute, otherwise normal EKG    ECHO No results found for this or any previous visit.     CATH: No results found for this or any previous visit.     Lab Results   Component Value Date     11/26/2024    K 4.1 11/26/2024     11/26/2024    CO2 26 11/26/2024    BUN 7 11/26/2024    CREATININE 0.64 11/26/2024    CALCIUM 8.8 11/26/2024    ANIONGAP 7 11/26/2024    EGFRNONAA 134 03/07/2022       No results found for: "CHOL"  No results found for: "HDL"  No results found for: "LDLCALC"  No results found for: "TRIG"  No results found for: "CHOLHDL"    Lab Results   Component Value Date    WBC 11.63 (H) 11/26/2024    HGB 12.8 11/26/2024    HCT 37.4 (L) 11/26/2024    MCV 88.4 11/26/2024     11/26/2024         Current Medications[1]    Review of Systems   Constitutional:  Negative for chills, diaphoresis, fever and " "malaise/fatigue.   Respiratory:  Positive for shortness of breath. Negative for cough.    Cardiovascular:  Positive for chest pain and palpitations. Negative for orthopnea, claudication, leg swelling and PND.   Gastrointestinal:  Negative for abdominal pain, heartburn, nausea and vomiting.   Neurological:  Positive for dizziness.       Objective:   /68 (BP Location: Left arm, Patient Position: Standing)   Pulse 98   Ht 5' 7" (1.702 m)   Wt 74.4 kg (164 lb)   LMP 08/13/2024   SpO2 97%   BMI 25.69 kg/m²     Physical Exam  Constitutional:       General: She is not in acute distress.     Appearance: Normal appearance.   Cardiovascular:      Rate and Rhythm: Regular rhythm. Tachycardia present.      Pulses: Normal pulses.      Heart sounds: Normal heart sounds. No murmur heard.     No friction rub. No gallop.   Pulmonary:      Effort: Pulmonary effort is normal.      Breath sounds: Normal breath sounds. No wheezing or rales.   Musculoskeletal:      Right lower leg: No edema.      Left lower leg: No edema.   Skin:     General: Skin is warm and dry.   Neurological:      Mental Status: She is alert.           Assessment:     1. Syncope and collapse  EKG 12-lead    EKG 12-lead    Echo    Cardiac Monitor - 3-15 Day Adult      2. POTS (postural orthostatic tachycardia syndrome)  Ambulatory referral/consult to Cardiology    Echo    Cardiac Monitor - 3-15 Day Adult            Plan:   No problem-specific Assessment & Plan notes found for this encounter.      Recurrent lightheadedness/near-syncope  Recurrent syncope  Palpitations  - Orthostatic vitals in clinic today significant increase in heart rate from sitting to standing greater than 30 beats per minute  Lying BP  106/62 heart rate 86 bpm  Sitting blood pressure 98/60 HR 60 bpm  Standing blood pressure 108/62 HR 98  bpm  - Ddx:  POTS vs dysautonomia    Plan:  - schedule echo for evaluation structural heart disease  - 14 day ambulatory cardiac monitor placed  - " Increase sodium and water intake.     Atypical chest pain  - Young age and no significant risk factors for CAD  - Exercise testing is not indicated at this time    Follow-up in 3 months          [1]   Current Outpatient Medications:     atomoxetine (STRATTERA) 40 MG capsule, Take 40 mg by mouth every morning. (Patient not taking: Reported on 10/7/2024), Disp: , Rfl:     cephALEXin (KEFLEX) 500 MG capsule, Take 500 mg by mouth every 6 (six) hours. (Patient not taking: Reported on 10/7/2024), Disp: , Rfl:     gabapentin (NEURONTIN) 300 MG capsule, Take 1 capsule (300 mg total) by mouth 2 (two) times daily. (Patient not taking: Reported on 10/7/2024), Disp: 180 capsule, Rfl: 3

## 2025-02-17 NOTE — PROGRESS NOTES
Return OB Visit    24 y.o. female  at 26w2d   She has no complaints.   Reports good fetal movement or fluttering. Denies any vaginal bleeding, leakage of fluid, cramping, contractions, or pressure.   Total weight gain/weight loss in pregnancy: Not found.     Vitals  BP: 118/63  Pulse: 83  Weight: 74.4 kg (164 lb)  Prenatal  Movement: Present    FHTs reassuring    Prenatal Labs:  Lab Results   Component Value Date    GROUPTRH O POS 10/07/2024    HGB 12.6 2025    HCT 36.7 (L) 2025     2025    HEPBSAG Non-Reactive 10/07/2024    [id number] Non-Reactive 10/07/2024    LABNGO Negative 10/07/2024    LABURIN Skin/Urogenital Alexa Isolated, no further workup. 10/07/2024       A: 26w2d           ICD-10-CM ICD-9-CM    1. POTS (postural orthostatic tachycardia syndrome)  G90.A 427.89       2. History of  delivery, currently pregnant  O34.219 654.20       3. 26 weeks gestation of pregnancy  Z3A.26 V22.2       4. Frequency of urination  R35.0 788.41 POCT URINALYSIS W/O SCOPE          P: Bleeding, daily fetal kick counts, and  labor/labor precautions discussed.    The following were addressed during this visit:    25-28 Weeks  -  Labor Signs   - Weight Gain/Diet/Exercise   - Rooming in baby during your hospital stay       No orders of the defined types were placed in this encounter.      Questions answered to desired level of satisfaction  Verbalized understanding to all information and instructions provided.  Follow up in about 2 weeks (around 3/3/2025) for ROB. Alyse Boswell, DO [facility]

## 2025-02-23 ENCOUNTER — HOSPITAL ENCOUNTER (EMERGENCY)
Facility: HOSPITAL | Age: 25
Discharge: HOME | End: 2025-02-23
Attending: OBSTETRICS & GYNECOLOGY
Payer: COMMERCIAL

## 2025-02-23 VITALS
DIASTOLIC BLOOD PRESSURE: 72 MMHG | WEIGHT: 155.63 LBS | HEIGHT: 67 IN | HEART RATE: 88 BPM | SYSTOLIC BLOOD PRESSURE: 125 MMHG | BODY MASS INDEX: 24.43 KG/M2 | RESPIRATION RATE: 20 BRPM | OXYGEN SATURATION: 96 % | TEMPERATURE: 97 F

## 2025-02-23 DIAGNOSIS — R10.2 PAIN OF ROUND LIGAMENT AFFECTING PREGNANCY, ANTEPARTUM: Primary | ICD-10-CM

## 2025-02-23 DIAGNOSIS — O34.219 PREVIOUS CESAREAN SECTION COMPLICATING PREGNANCY, ANTEPARTUM CONDITION OR COMPLICATION: ICD-10-CM

## 2025-02-23 DIAGNOSIS — O26.899 PAIN OF ROUND LIGAMENT AFFECTING PREGNANCY, ANTEPARTUM: Primary | ICD-10-CM

## 2025-02-23 DIAGNOSIS — G90.A POTS (POSTURAL ORTHOSTATIC TACHYCARDIA SYNDROME): ICD-10-CM

## 2025-02-23 DIAGNOSIS — Z3A.27 27 WEEKS GESTATION OF PREGNANCY: ICD-10-CM

## 2025-02-23 LAB
BILIRUB UR QL STRIP: NEGATIVE
CLARITY UR: CLEAR
COLOR UR: ABNORMAL
GLUCOSE UR STRIP-MCNC: NORMAL MG/DL
KETONES UR STRIP-SCNC: NEGATIVE MG/DL
LEUKOCYTE ESTERASE UR QL STRIP: NEGATIVE
MUCOUS, UA: ABNORMAL /LPF
NITRITE UR QL STRIP: NEGATIVE
PH UR STRIP: 7.5 PH UNITS
PROT UR QL STRIP: NEGATIVE
RBC # UR STRIP: ABNORMAL /UL
RBC #/AREA URNS HPF: 27 /HPF
SP GR UR STRIP: 1.01
SQUAMOUS #/AREA URNS LPF: ABNORMAL /HPF
UROBILINOGEN UR STRIP-ACNC: NORMAL MG/DL
WBC #/AREA URNS HPF: 1 /HPF

## 2025-02-23 PROCEDURE — 81003 URINALYSIS AUTO W/O SCOPE: CPT | Performed by: OBSTETRICS & GYNECOLOGY

## 2025-02-23 PROCEDURE — 99284 EMERGENCY DEPT VISIT MOD MDM: CPT

## 2025-02-23 NOTE — DISCHARGE INSTRUCTIONS
Keep fu appointment with Dr. Tavera as previously scheduled. Call [facility] at [phone] if you have any questions or concerns.

## 2025-02-23 NOTE — ED NOTES
Plan of care discussed with pt per Dr. Ho. Pt to purchase and wear maternity belt to help with round ligament discomfort and no heavy lifting and decrease strenuous physical labor to assist with muscle discomforts. Pt and pt SO voiced understanding of and agree with plan of care. Pt to keep fu appt with Dr. Tavera as previously scheduled.    3-5 Sessions:  Increase knee flexion 20 degrees

## 2025-02-23 NOTE — ED NOTES
Pt recd ambulatory to [facility] for c/o cramping since around 0800 this a.m. Pt denies vaginal bleeding or leakage. Pt states + fetal movement. Pt connected to EFM. SO at bs. Assess WNL. Water jug given to pt. Instructed pt to increase oral hydration. Pt. Voiced understanding.

## 2025-02-23 NOTE — ED PROVIDER NOTES
Encounter Date: 2025    [facility] Physician: Chet Ho   Primary OBGYN:Dr. Tavera    Admit Diagnosis/Chief Complaint: Abdominal Pain  History     Chief Complaint   Patient presents with    Abdominal Cramping     Patient is a 24-year-old  2 para 1 who presents at 27 weeks and 1 day with complaints of right lower quadrant abdominal cramping that began at 8:00 a.m. as she was getting out of bed.  She reports that the discomfort lasts a proximally 30 seconds is sharp and localized to the groin/vaginal area.  She also states that her lower back at the right SI joint is also uncomfortable as well.  She reports that she was quite active over the past couple of days helping move a shed and caring for her 2-year-old child.  Patient is status post  section and does not wear a maternity support belt.  She does admit to carrying her 2-year-old toddler around.  She denies fever chills dysuria frequency urgency but was concerned about being dehydrated.  She denies regular uterine contractions.        Obstetric HPI:  Patient reports None contractions, active fetal movement, absent vaginal bleeding , absent loss of fluid      Objective:     Vital Signs (Most Recent):  Temp: 97.2 °F (36.2 °C) (25 1138)  Pulse: 88 (25 1138)  Resp: 20 (25 1138)  BP: 125/72 (25 1138)  SpO2: 96 % (25 1138) Vital Signs (24h Range):  Temp:  [97.2 °F (36.2 °C)] 97.2 °F (36.2 °C)  Pulse:  [88] 88  Resp:  [20] 20  SpO2:  [96 %] 96 %  BP: (125)/(72) 125/72     Weight: 70.6 kg (155 lb 9.6 oz)  Body mass index is 24.37 kg/m².    FHT: 140  Cat 1 (reassuring)  TOCO:  No contractions noted    No intake or output data in the 24 hours ending 25 1327    Cervical Exam:  Per nurse  Dilation:  0  Effacement:  0%  Station: -3  Presentation: Vertex     Significant Labs:  Recent Lab Results         25  1152        Appearance, UA Clear       Bilirubin (UA) Negative       Color, UA Light Yellow       Glucose, UA  Normal       Ketones, UA Negative       Leukocyte Esterase, UA Negative       Mucous Occasional       NITRITE UA Negative       Blood, UA Small       pH, UA 7.5       Protein, UA Negative       RBC, UA 27       Spec Grav UA 1.013       Squamous Epithelial Cells, UA Occasional       UROBILINOGEN UA Normal       WBC, UA 1             Review of patient's allergies indicates:   Allergen Reactions    Acetaminophen     Oxycodone hcl     Percocet [oxycodone-acetaminophen] Itching     Past Medical History:   Diagnosis Date    Acne     Hypoglycemia      Past Surgical History:   Procedure Laterality Date     SECTION N/A 2022    Procedure:  SECTION;  Surgeon: Boby Tavera DO;  Location: [facility] L&D;  Service: OB/GYN;  Laterality: N/A;     Family History   Problem Relation Name Age of Onset    No Known Problems Father      Heart disease Mother      Stroke Mother      Protein S deficiency Mother      Clotting disorder Mother      No Known Problems Brother      No Known Problems Sister      No Known Problems Brother      Clotting disorder Sister      No Known Problems Sister       Social History[1]  Review of Systems   Constitutional:  Negative for appetite change, chills, fatigue and fever.   HENT:  Negative for congestion.    Eyes:  Negative for visual disturbance.   Respiratory:  Negative for cough, chest tightness and shortness of breath.    Cardiovascular:  Negative for chest pain, palpitations and leg swelling.   Gastrointestinal:  Positive for abdominal pain. Negative for abdominal distention, blood in stool, constipation, diarrhea, nausea and vomiting.   Endocrine: Negative for cold intolerance, heat intolerance, polydipsia, polyphagia and polyuria.   Genitourinary:  Negative for difficulty urinating, dyspareunia, dysuria, flank pain, frequency, pelvic pain, urgency, vaginal bleeding, vaginal discharge and vaginal pain.   Musculoskeletal:  Positive for back pain.   Skin: Negative.     Neurological:  Negative for headaches.   Psychiatric/Behavioral:  Negative for agitation, behavioral problems, confusion and sleep disturbance.        Physical Exam     Initial Vitals [25 1138]   BP Pulse Resp Temp SpO2   125/72 88 20 97.2 °F (36.2 °C) 96 %      MAP       --         Physical Exam    Nursing note and vitals reviewed.  Constitutional: She appears well-developed and well-nourished. No distress.   HENT:   Head: Normocephalic and atraumatic.   Nose: Nose normal.   Eyes: EOM are normal. Pupils are equal, round, and reactive to light.   Neck:   Normal range of motion.  Cardiovascular:  Normal rate.           Pulmonary/Chest: No respiratory distress.   Abdominal: Abdomen is soft. There is no rebound and no guarding.   Musculoskeletal:         General: No edema.      Cervical back: Normal range of motion.     Neurological: She is alert and oriented to person, place, and time.   Skin: Skin is warm and dry.   Psychiatric: She has a normal mood and affect. Thought content normal.         ED Course     Labs Reviewed   URINALYSIS - Abnormal       Result Value    Color, UA Light Yellow      Clarity, UA Clear      pH, UA 7.5      Leukocytes, UA Negative      Nitrites, UA Negative      Protein, UA Negative      Glucose, UA Normal      Ketones, UA Negative      Urobilinogen, UA Normal      Bilirubin, UA Negative      Blood, UA Small (*)     Specific [place], UA 1.013     URINALYSIS, MICROSCOPIC - Abnormal    WBC, UA 1      RBC, UA 27 (*)     Squamous Epithelial Cells, UA Occasional (*)     Mucous Occasional (*)         Imaging Results    None          Medical Decision Making  Amount and/or Complexity of Data Reviewed  Labs: ordered.       Medications - No data to display              ED Course as of 25 1327   [place] 2025   1241 Patient is a 24-year-old  2 para 1 who presents at 27 weeks and 1 day with complaints of right lower quadrant abdominal discomfort that started at 8:00 a.m. this  morning as she was getting out of bed.  She describes a sharp shooting pain in the groin area down by her vagina.  She also reports right lower back discomfort at the SI joint.  She reports that she did a lot of lifting over the past 2 days around the house and carries her 2-year-old as well.  She states that she helped her  move a shed yesterday.  She has a history of a previous  but denies fever chills nausea vomiting dysuria frequency and urgency.  She has a reactive nonstress test with no uterine contractions noted.  Urinalysis is negative for nitrites ketones protein and bilirubin with a small amount of blood noted.  Last intercourse was a week ago.  (she does report pelvic pressure).  Her cervix is closed thick and high posterior.  Her pain is reproduced by medial displacement of the uterus, consistent with round ligament pain.  The use of a maternity support belt and weight restriction of lifting was discussed.  Patient will follow-up with Dr. Tavera in 1 week. [JA]      ED Course User Index  [JA] Chet Ho MD                 Clinical Impression:   Final diagnoses:  [Z3A.27] 27 weeks gestation of pregnancy  [O26.899, R10.2] Pain of round ligament affecting pregnancy, antepartum (Primary)  [O34.219] Previous  section complicating pregnancy, antepartum condition or complication  [G90.A] POTS (postural orthostatic tachycardia syndrome)        ED Disposition Condition    Discharge Stable          ED Prescriptions    None       Follow-up Information       Follow up With Specialties Details Why Contact Info    Boby Tavera DO Obstetrics and Gynecology In 1 week  [address] [place] [id number]  [phone]                 [1]   Social History  Tobacco Use    Smoking status: Never    Smokeless tobacco: Never   Substance Use Topics    Alcohol use: Never    Drug use: Not Currently        Chet Ho MD  [phone]

## 2025-03-03 ENCOUNTER — TELEPHONE (OUTPATIENT)
Dept: OBSTETRICS AND GYNECOLOGY | Facility: CLINIC | Age: 25
End: 2025-03-03
Payer: COMMERCIAL

## 2025-03-03 NOTE — TELEPHONE ENCOUNTER
----- Message from Marilee sent at 3/3/2025  9:20 AM CST -----  Kathleen from [facility] called about referral. She needs help on diagnosis and symptoms. She said they only do EMG's. They dont actually treat pts with symptoms. Please let her know if EMG is only thing she needs or does she need a consult with a Neurologist. Call her at [phone].

## 2025-03-10 ENCOUNTER — TELEPHONE (OUTPATIENT)
Dept: OBSTETRICS AND GYNECOLOGY | Facility: CLINIC | Age: 25
End: 2025-03-10
Payer: COMMERCIAL

## 2025-03-12 ENCOUNTER — TELEPHONE (OUTPATIENT)
Dept: OBSTETRICS AND GYNECOLOGY | Facility: CLINIC | Age: 25
End: 2025-03-12
Payer: COMMERCIAL

## 2025-03-20 ENCOUNTER — HOSPITAL ENCOUNTER (EMERGENCY)
Facility: HOSPITAL | Age: 25
Discharge: HOME | End: 2025-03-20
Attending: OBSTETRICS & GYNECOLOGY
Payer: COMMERCIAL

## 2025-03-20 VITALS
RESPIRATION RATE: 18 BRPM | HEIGHT: 67 IN | WEIGHT: 169 LBS | SYSTOLIC BLOOD PRESSURE: 108 MMHG | TEMPERATURE: 98 F | DIASTOLIC BLOOD PRESSURE: 60 MMHG | BODY MASS INDEX: 26.53 KG/M2 | HEART RATE: 65 BPM

## 2025-03-20 DIAGNOSIS — S40.862A INSECT BITE OF LEFT UPPER EXTREMITY, INITIAL ENCOUNTER: ICD-10-CM

## 2025-03-20 DIAGNOSIS — Z03.71 ENCOUNTER FOR SUSPECTED PROBLEM WITH AMNIOTIC CAVITY AND MEMBRANE RULED OUT: Primary | ICD-10-CM

## 2025-03-20 DIAGNOSIS — Z3A.30 30 WEEKS GESTATION OF PREGNANCY: ICD-10-CM

## 2025-03-20 DIAGNOSIS — N94.89 UTERINE CRAMPING: ICD-10-CM
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
## 2025-03-20 LAB
BILIRUB UR QL STRIP: NEGATIVE
CLARITY UR: CLEAR
COLOR UR: ABNORMAL
CTP QC/QA: YES
FIBRONECTIN FETAL SPEC QL: NEGATIVE
GLUCOSE UR STRIP-MCNC: NORMAL MG/DL
KETONES UR STRIP-SCNC: NEGATIVE MG/DL
LEUKOCYTE ESTERASE UR QL STRIP: NEGATIVE
MUCOUS, UA: ABNORMAL /LPF
NITRITE UR QL STRIP: NEGATIVE
PH UR STRIP: 6.5 PH UNITS
PROT UR QL STRIP: NEGATIVE
RBC # UR STRIP: ABNORMAL /UL
RBC #/AREA URNS HPF: 37 /HPF
RUPTURE OF MEMBRANE: NEGATIVE
SP GR UR STRIP: 1.01
SQUAMOUS #/AREA URNS LPF: ABNORMAL /HPF
UROBILINOGEN UR STRIP-ACNC: NORMAL MG/DL
WBC #/AREA URNS HPF: 2 /HPF

## 2025-03-20 PROCEDURE — 81003 URINALYSIS AUTO W/O SCOPE: CPT | Performed by: OBSTETRICS & GYNECOLOGY

## 2025-03-20 PROCEDURE — 82731 ASSAY OF FETAL FIBRONECTIN: CPT | Performed by: OBSTETRICS & GYNECOLOGY

## 2025-03-20 PROCEDURE — 99284 EMERGENCY DEPT VISIT MOD MDM: CPT | Mod: 25

## 2025-03-20 RX ORDER — MUPIROCIN 20 MG/G
OINTMENT TOPICAL 2 TIMES DAILY
Qty: 15 G | Refills: 0 | Status: SHIPPED | OUTPATIENT
Start: 2025-03-20 | End: 2025-03-25

## 2025-03-20 NOTE — ED PROVIDER NOTES
Encounter Date: 3/20/2025    YVONNE Physician: Mark Garzon   Primary OBGYN:Dr. Tavera    Admit Diagnosis/Chief Complaint: Leakage of Fluid, Pelvic Pain, and Left arm insect bite  History     Chief Complaint   Patient presents with    Rash       Pt of Dr Tavera    23yo  at 30w5d coming with report of possible leakage of fluid, clear 2100hrs x 1 episode - none further, some cramping/?contractions and noticing a likely spider bite this morning over her left elbow.    Denies rhythmic contractions, vaginal bleeding  Reports good fetal movement    Pregnancy complicated by POTS - seeing MFM, cardiology    States she has canceled several routine prenatal care appointments as she's seeing high risk.          Obstetric HPI:  Patient reports irregular contractions, active fetal movement, absent vaginal bleeding ,  uncertain  loss of fluid      Objective:     Vital Signs (Most Recent):  Temp: 97.8 °F (36.6 °C) (25 1124)  Pulse: 65 (25 1124)  Resp: 18 (25 1124)  BP: 108/60 (25 1124) Vital Signs (24h Range):  Temp:  [97.8 °F (36.6 °C)] 97.8 °F (36.6 °C)  Pulse:  [65] 65  Resp:  [18] 18  BP: (108)/(60) 108/60        There is no height or weight on file to calculate BMI.    FHT: 130 Cat 1 (reassuring)  TOCO:  irritability minutes    No intake or output data in the 24 hours ending 25 1321    Cervical Exam:  Dilation:  0  Effacement:  0%  Station: -3  Presentation: Vertex     Significant Labs:  Recent Lab Results         25  1157   25  1139        Appearance, UA   Clear       Bilirubin (UA)   Negative       Color, UA   Light Yellow       Fetal Fibronectin Negative         Glucose, UA   Normal       Ketones, UA   Negative       Leukocyte Esterase, UA   Negative       Mucous   Occasional       NITRITE UA   Negative       Blood, UA   Small       pH, UA   6.5       Protein, UA   Negative       RBC, UA   37       Spec Grav UA   1.015       Squamous Epithelial Cells, UA   Occasional        UROBILINOGEN UA   Normal       WBC, UA   2             Review of patient's allergies indicates:   Allergen Reactions    Acetaminophen     Oxycodone hcl     Percocet [oxycodone-acetaminophen] Itching     Past Medical History:   Diagnosis Date    Acne     Hypoglycemia      Past Surgical History:   Procedure Laterality Date     SECTION N/A 2022    Procedure:  SECTION;  Surgeon: Boby Tavera DO;  Location: [facility] L&D;  Service: OB/GYN;  Laterality: N/A;     Family History   Problem Relation Name Age of Onset    No Known Problems Father      Heart disease Mother      Stroke Mother      Protein S deficiency Mother      Clotting disorder Mother      No Known Problems Brother      No Known Problems Sister      No Known Problems Brother      Clotting disorder Sister      No Known Problems Sister       Social History[1]  Review of Systems   Constitutional:  Negative for chills and fever.   HENT:  Negative for sore throat.    Eyes:  Negative for photophobia and visual disturbance.   Respiratory:  Negative for shortness of breath.    Cardiovascular:  Negative for chest pain and palpitations.   Gastrointestinal:  Negative for abdominal pain, blood in stool, constipation, diarrhea, nausea and vomiting.   Genitourinary:  Positive for pelvic pain (? contractions) and vaginal discharge (? LOF). Negative for dysuria, genital sores, hematuria, urgency and vaginal bleeding.   Musculoskeletal:  Negative for back pain.   Skin:  Negative for rash.   Neurological:  Negative for dizziness, seizures, syncope, weakness, light-headedness and headaches.   Hematological:  Does not bruise/bleed easily.       Physical Exam     Initial Vitals [25 1124]   BP Pulse Resp Temp SpO2   108/60 65 18 97.8 °F (36.6 °C) --      MAP       --         Physical Exam    Constitutional: She appears well-developed and well-nourished. No distress.   HENT:   Head: Normocephalic and atraumatic.   Eyes: Pupils are equal, round,  and reactive to light. No scleral icterus.   Neck: Neck supple.   Normal range of motion.  Cardiovascular:  Normal rate and regular rhythm.           Pulmonary/Chest: No respiratory distress. She exhibits no tenderness.   Abdominal: Abdomen is soft. She exhibits no distension. There is no abdominal tenderness.   Gravid - uterus nontender There is no rebound and no guarding.   Musculoskeletal:         General: Normal range of motion.      Cervical back: Normal range of motion and neck supple.     Neurological: She is alert and oriented to person, place, and time. She has normal reflexes. She displays normal reflexes.   Skin:   Mild erythema over left elbow - can see cental point of liely bite but no necrosis. Edema and erythema markedly improved per pt report.         ED Course     Labs Reviewed   URINALYSIS, REFLEX TO URINE CULTURE - Abnormal       Result Value    Color, UA Light Yellow      Clarity, UA Clear      pH, UA 6.5      Leukocytes, UA Negative      Nitrites, UA Negative      Protein, UA Negative      Glucose, UA Normal      Ketones, UA Negative      Urobilinogen, UA Normal      Bilirubin, UA Negative      Blood, UA Small (*)     Specific [place], UA 1.015     URINALYSIS, MICROSCOPIC - Abnormal    WBC, UA 2      RBC, UA 37 (*)     Squamous Epithelial Cells, UA Occasional (*)     Mucous Occasional (*)    FETAL FIBRONECTIN - Normal    Fetal Fibronectin Negative     POCT RUPTURE OF MEMBRANE     Negative     Imaging Results              US OB Limited with Biophysical Profile (xpd) (In process)  Result time 03/20/25 12:38:30   Procedure changed from US OB 14+ Weeks TransAbd, w/Biophysical Profile, w/o NST, Single Gestation (xpd)                    Medical Decision Making    ? LOF      OB US KRISHNA / BPP / Cervical length      ROM Plus      Exam    Pelvic pain      Cervical exam      FFN      Tocometry      NST    Insect bite      Physical evaluation      Discussion with medicine      Amount and/or Complexity of  Data Reviewed  Labs: ordered.  Radiology: ordered.    Risk  Prescription drug management.       Medications - No data to display                           Clinical Impression:   Final diagnoses:    [Z3A.30] 30 weeks gestation of pregnancy -      Noted - sees Dr Fried 3/31 for POTS.      Has canceled most routine prenatal care appointments -      Counseled that she ia at q2week visits to check fetus, urine, BP etc .    [S40.862A, W57.XXXA] Insect bite of left upper extremity, initial encounter -      Spoke with medicine/Surgery - will treat with keeping clean and dry,      Topical antibiotic (Mupirocin topical Erx sent) and Topical hydrocortisone (OTC).      Will call/return if worsening or developing an infection.     [N94.89] Uterine cramping -      No evidence of  labor.      Cervix closed/long/posterior.      Cervical length 4.5cm.      FFN negative.      Obstetric precautions given.    [Z03.71] Encounter for suspected problem with amniotic cavity and membrane ruled out -        No clinical evidence of PPROM.      ROM PLus Negative.      KRISHNA 9.3.      Obstetric precautions given. (Primary)        ED Disposition Condition    Discharge Stable          ED Prescriptions       Medication Sig Dispense Start Date End Date Auth. Provider    mupirocin (BACTROBAN) 2 % ointment Apply topically 2 (two) times daily. Apply thin film twice daily for 5 days 15 g 3/20/2025 3/25/2025 Mark Garzon MD          Follow-up Information       Follow up With Specialties Details Why Contact Info    Boby Tavera DO Obstetrics and Gynecology Schedule an appointment as soon as possible for a visit  for routine prenatal care [address] [place] [id number]  [phone]                 [1]   Social History  Tobacco Use    Smoking status: Never    Smokeless tobacco: Never   Substance Use Topics    Alcohol use: Never    Drug use: Not Currently        Mark Garzon MD  25 [phone]

## 2025-03-20 NOTE — ED NOTES
Dr. Garzon at bedside to see pt    
Pt dc'd per order in stable condition.   
Rec'd  at 30.5 wks gestation c/o a possible spider bite to L arm, LOF, and abd cramping. Redness and swelling noticed to L arm near elbow. Area is warm to the touch. Pt states she noticed LOF at approx 9 PM last night and abd cramping this morning. Pt denies any vaginal bleeding and states she has felt fetal movement.   
No

## 2025-03-21 ENCOUNTER — HOSPITAL ENCOUNTER (EMERGENCY)
Facility: HOSPITAL | Age: 25
Discharge: HOME | End: 2025-03-21
Attending: OBSTETRICS & GYNECOLOGY
Payer: COMMERCIAL

## 2025-03-21 VITALS
HEART RATE: 93 BPM | SYSTOLIC BLOOD PRESSURE: 107 MMHG | TEMPERATURE: 99 F | RESPIRATION RATE: 18 BRPM | DIASTOLIC BLOOD PRESSURE: 58 MMHG

## 2025-03-21 DIAGNOSIS — R10.2 PELVIC PRESSURE IN PREGNANCY, ANTEPARTUM, THIRD TRIMESTER: Primary | ICD-10-CM

## 2025-03-21 DIAGNOSIS — Z3A.30 30 WEEKS GESTATION OF PREGNANCY: ICD-10-CM

## 2025-03-21 DIAGNOSIS — N94.9 PAIN OF ROUND LIGAMENT: ICD-10-CM

## 2025-03-21 DIAGNOSIS — O26.893 PELVIC PRESSURE IN PREGNANCY, ANTEPARTUM, THIRD TRIMESTER: Primary | ICD-10-CM

## 2025-03-21 LAB
AMORPHOUS CRYSTALS, UA: ABNORMAL /HPF
BILIRUB UR QL STRIP: NEGATIVE
CLARITY UR: ABNORMAL
COLOR UR: ABNORMAL
GLUCOSE UR STRIP-MCNC: 50 MG/DL
KETONES UR STRIP-SCNC: NEGATIVE MG/DL
LEUKOCYTE ESTERASE UR QL STRIP: NEGATIVE
MUCOUS, UA: ABNORMAL /LPF
NITRITE UR QL STRIP: NEGATIVE
PH UR STRIP: 7 PH UNITS
PROT UR QL STRIP: NEGATIVE
RBC # UR STRIP: ABNORMAL /UL
RBC #/AREA URNS HPF: 26 /HPF
SP GR UR STRIP: 1.02
SQUAMOUS #/AREA URNS LPF: ABNORMAL /HPF
UROBILINOGEN UR STRIP-ACNC: NORMAL MG/DL
WBC #/AREA URNS HPF: 1 /HPF

## 2025-03-21 PROCEDURE — 99284 EMERGENCY DEPT VISIT MOD MDM: CPT

## 2025-03-21 PROCEDURE — 81003 URINALYSIS AUTO W/O SCOPE: CPT | Performed by: OBSTETRICS & GYNECOLOGY

## 2025-03-21 NOTE — ED NOTES
Rec'd pt ambulatory to OB ED c/o abd cramping since this morning. Pt denies any vaginal bleeding or LOF and endorses good fetal movement. Pt connected to [facility].

## 2025-03-21 NOTE — ED PROVIDER NOTES
"Encounter Date: 3/21/2025    YVONNE Physician: Chet Ho   Primary OBGYN:Dr. Tavera    Admit Diagnosis/Chief Complaint: Abdominal Pain (cramping)  History     Chief Complaint   Patient presents with    abdominal cramping     Patient is a 24-year-old  2 para 1 who presents at 30 weeks and 6 days with complaints of lower abdominal cramping and pressure sensation.  She denies uterine contractions or vaginal bleeding.  She reports she "edouard down a pig" and caught it and has had lower abdominal cramping since then.  She also attempted to clean the house today and has not been wearing her maternity support belt as previously recommended.        Obstetric HPI:  Patient reports None contractions, active fetal movement, absent vaginal bleeding , absent loss of fluid      Objective:     Vital Signs (Most Recent):  Temp: 98.7 °F (37.1 °C) (250)  Pulse: 93 (25)  Resp: 18 (25)  BP: (!) 107/58 (25) Vital Signs (24h Range):  Temp:  [98.7 °F (37.1 °C)] 98.7 °F (37.1 °C)  Pulse:  [93] 93  Resp:  [18] 18  BP: (107)/(58) 107/58        There is no height or weight on file to calculate BMI.    FHT: 130  Cat 1 (reassuring)  TOCO:  Irritability noted  No intake or output data in the 24 hours ending 25 2205    Cervical Exam:  Per nurse  Dilation:  0  Effacement:  0%  Station: -3  Presentation: Vertex     Significant Labs:  Recent Lab Results         25  1855        Amorphous Crystals, UA Moderate       Appearance, UA Turbid       Bilirubin (UA) Negative       Color, UA Light Yellow       Glucose, UA 50       Ketones, UA Negative       Leukocyte Esterase, UA Negative       Mucous Occasional       NITRITE UA Negative       Blood, UA Small       pH, UA 7.0       Protein, UA Negative       RBC, UA 26       Spec Grav UA 1.017       Squamous Epithelial Cells, UA Occasional       UROBILINOGEN UA Normal       WBC, UA 1             Review of patient's allergies indicates: "   Allergen Reactions    Acetaminophen     Oxycodone hcl     Percocet [oxycodone-acetaminophen] Itching     Past Medical History:   Diagnosis Date    Acne     Hypoglycemia      Past Surgical History:   Procedure Laterality Date     SECTION N/A 2022    Procedure:  SECTION;  Surgeon: Boby Tavera DO;  Location: [facility] L&D;  Service: OB/GYN;  Laterality: N/A;     Family History   Problem Relation Name Age of Onset    No Known Problems Father      Heart disease Mother      Stroke Mother      Protein S deficiency Mother      Clotting disorder Mother      No Known Problems Brother      No Known Problems Sister      No Known Problems Brother      Clotting disorder Sister      No Known Problems Sister       Social History[1]  Review of Systems   Constitutional:  Negative for appetite change, chills, fatigue and fever.   HENT:  Negative for congestion.    Eyes:  Negative for visual disturbance.   Respiratory:  Negative for cough, chest tightness and shortness of breath.    Cardiovascular:  Negative for chest pain, palpitations and leg swelling.   Gastrointestinal:  Positive for abdominal pain. Negative for abdominal distention, blood in stool, constipation, diarrhea, nausea and vomiting.   Endocrine: Negative for cold intolerance, heat intolerance, polydipsia, polyphagia and polyuria.   Genitourinary:  Positive for pelvic pain. Negative for difficulty urinating, dyspareunia, dysuria, flank pain, frequency, urgency, vaginal bleeding, vaginal discharge and vaginal pain.   Musculoskeletal:  Positive for back pain. Negative for arthralgias.   Skin: Negative.    Neurological: Negative.  Negative for light-headedness and headaches.   Psychiatric/Behavioral: Negative.  Negative for agitation and behavioral problems.        Physical Exam     Initial Vitals [25 1820]   BP Pulse Resp Temp SpO2   (!) 107/58 93 18 98.7 °F (37.1 °C) --      MAP       --         Physical Exam    Nursing note and  "vitals reviewed.  Constitutional: She appears well-developed and well-nourished. No distress.   HENT:   Head: Normocephalic and atraumatic.   Nose: Nose normal.   Eyes: EOM are normal. Pupils are equal, round, and reactive to light.   Neck:   Normal range of motion.  Cardiovascular:  Normal rate.           Pulmonary/Chest: No respiratory distress.   Abdominal: Abdomen is soft. There is no abdominal tenderness.   Musculoskeletal:         General: No edema.      Cervical back: Normal range of motion.     Neurological: She is alert and oriented to person, place, and time.   Skin: Skin is warm and dry.   Psychiatric: She has a normal mood and affect. Thought content normal.         ED Course     Labs Reviewed   URINALYSIS - Abnormal       Result Value    Color, UA Light Yellow      Clarity, UA Turbid      pH, UA 7.0      Leukocytes, UA Negative      Nitrites, UA Negative      Protein, UA Negative      Glucose, UA 50 (*)     Ketones, UA Negative      Urobilinogen, UA Normal      Bilirubin, UA Negative      Blood, UA Small (*)     Specific [place], UA 1.017     URINALYSIS, MICROSCOPIC - Abnormal    WBC, UA 1      RBC, UA 26 (*)     Squamous Epithelial Cells, UA Occasional (*)     Amorphous Crystals, UA Moderate (*)     Mucous Occasional (*)         Imaging Results    None          Medical Decision Making  Amount and/or Complexity of Data Reviewed  Labs: ordered.       Medications - No data to display              ED Course as of 03/21/25 2205   Fri Mar 21, 2025   2200 24-year-old  2 para 1 at 30 weeks and 6 days with complaints of lower abdominal pelvic pressure/cramping.  She denies vaginal bleeding leakage of amniotic fluid decreased fetal movement chills fever nausea vomiting diarrhea.  Her cervical exam is closed thick and high which is unchanged from her examination yesterday.  She admits to "chasing down a pig and catching it today as well as cleaning up her house without wearing her maternity support belt.  " Urinalysis was negative for nitrites leukocyte esterase protein and bilirubin with small amount of blood in of 50 of glucose noted.  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  Patient will follow-up with Dr. Tavera in 1 week. [JA]      ED Course User Index  [JA] Chet Ho MD                 Clinical Impression:   Final diagnoses:  [Z3A.30] 30 weeks gestation of pregnancy  [O26.893, R10.2] Pelvic pressure in pregnancy, antepartum, third trimester (Primary)  [N94.9] Pain of round ligament        ED Disposition Condition    Discharge Stable          ED Prescriptions    None       Follow-up Information       Follow up With Specialties Details Why Contact Info    Amanda Dowling CNM Obstetrics and Gynecology In 1 week  [address] [facility] [id number] [phone]      [facility] - Emergency Dept (OB) Emergency Medicine  As needed [address] [address]               [1]   Social History  Tobacco Use    Smoking status: Never    Smokeless tobacco: Never   Substance Use Topics    Alcohol use: Never    Drug use: Not Currently        Chet Ho MD  03/21/25 [id number]

## 2025-04-02 ENCOUNTER — ROUTINE PRENATAL (OUTPATIENT)
Dept: OBSTETRICS AND GYNECOLOGY | Facility: CLINIC | Age: 25
End: 2025-04-02
Payer: COMMERCIAL

## 2025-04-02 VITALS
HEART RATE: 86 BPM | SYSTOLIC BLOOD PRESSURE: 108 MMHG | WEIGHT: 171 LBS | DIASTOLIC BLOOD PRESSURE: 63 MMHG | BODY MASS INDEX: 26.78 KG/M2

## 2025-04-02 DIAGNOSIS — Z3A.32 32 WEEKS GESTATION OF PREGNANCY: ICD-10-CM

## 2025-04-02 DIAGNOSIS — R35.0 FREQUENCY OF URINATION: ICD-10-CM

## 2025-04-02 DIAGNOSIS — O34.219 HISTORY OF CESAREAN DELIVERY, CURRENTLY PREGNANT: Primary | ICD-10-CM

## 2025-04-02 DIAGNOSIS — G90.A POTS (POSTURAL ORTHOSTATIC TACHYCARDIA SYNDROME): ICD-10-CM

## 2025-04-02 LAB
BILIRUB SERPL-MCNC: NORMAL MG/DL
BLOOD URINE, POC: NORMAL
CLARITY, UA: NORMAL
COLOR, UA: NORMAL
GLUCOSE UR QL STRIP: NORMAL
KETONES UR QL STRIP: NORMAL
LEUKOCYTE ESTERASE URINE, POC: NORMAL
NITRITE, POC UA: NORMAL
PH, POC UA: 5
PROTEIN, POC: NORMAL
SPECIFIC GRAVITY, POC UA: 1
UROBILINOGEN, POC UA: 0.2

## 2025-04-02 PROCEDURE — 0502F SUBSEQUENT PRENATAL CARE: CPT | Mod: CPTII,,, | Performed by: STUDENT IN AN ORGANIZED HEALTH CARE EDUCATION/TRAINING PROGRAM

## 2025-04-02 PROCEDURE — 99999 PR PBB SHADOW E&M-EST. PATIENT-LVL III: CPT | Mod: PBBFAC,,, | Performed by: STUDENT IN AN ORGANIZED HEALTH CARE EDUCATION/TRAINING PROGRAM

## 2025-04-02 PROCEDURE — 99213 OFFICE O/P EST LOW 20 MIN: CPT | Mod: PBBFAC | Performed by: STUDENT IN AN ORGANIZED HEALTH CARE EDUCATION/TRAINING PROGRAM

## 2025-04-02 NOTE — PROGRESS NOTES
Return OB Visit    24 y.o. female  at 32w4d   She has no complaints.   Reports good fetal movement or fluttering. Denies any vaginal bleeding, leakage of fluid, cramping, contractions, or pressure.   Total weight gain/weight loss in pregnancy: Not found.     Vitals  BP: 108/63  Pulse: 86  Weight: 77.6 kg (171 lb)  Prenatal  Fundal Height (cm): 31 cm  Fetal Heart Rate: 140  Movement: Present    Prenatal Labs:  Lab Results   Component Value Date    GROUPTRH O POS 10/07/2024    HGB 12.6 2025    HCT 36.7 (L) 2025     2025    HEPBSAG Non-Reactive 10/07/2024    [id number] Non-Reactive 10/07/2024    LABNGO Negative 10/07/2024    LABURIN Skin/Urogenital Alexa Isolated, no further workup. 10/07/2024       A: 32w4d           ICD-10-CM ICD-9-CM    1. History of  delivery, currently pregnant  O34.219 654.20       2. POTS (postural orthostatic tachycardia syndrome)  G90.A 427.89       3. 32 weeks gestation of pregnancy  Z3A.32 V22.2       4. Frequency of urination  R35.0 788.41 POCT URINALYSIS W/O SCOPE          P: Bleeding, daily fetal kick counts, and  labor/labor precautions discussed.    The following were addressed during this visit:    29-32 Weeks  - Circumsision plans   - Fetal Kick Counts/PIH/PTL precautions   - Quiet time       No orders of the defined types were placed in this encounter.      Questions answered to desired level of satisfaction  Verbalized understanding to all information and instructions provided.  Follow up in about 2 weeks (around 2025) for ROB. Alyse Boswell, DO [facility] OBGYN [facility]

## 2025-04-04 ENCOUNTER — RESULTS FOLLOW-UP (OUTPATIENT)
Dept: CARDIOLOGY | Facility: CLINIC | Age: 25
End: 2025-04-04

## 2025-04-16 ENCOUNTER — ROUTINE PRENATAL (OUTPATIENT)
Dept: OBSTETRICS AND GYNECOLOGY | Facility: CLINIC | Age: 25
End: 2025-04-16
Payer: COMMERCIAL

## 2025-04-16 VITALS
SYSTOLIC BLOOD PRESSURE: 111 MMHG | BODY MASS INDEX: 27.72 KG/M2 | DIASTOLIC BLOOD PRESSURE: 70 MMHG | WEIGHT: 177 LBS | HEART RATE: 88 BPM

## 2025-04-16 DIAGNOSIS — R35.0 FREQUENCY OF URINATION: ICD-10-CM

## 2025-04-16 DIAGNOSIS — O34.219 HISTORY OF CESAREAN DELIVERY, CURRENTLY PREGNANT: Primary | ICD-10-CM

## 2025-04-16 DIAGNOSIS — G90.A POTS (POSTURAL ORTHOSTATIC TACHYCARDIA SYNDROME): ICD-10-CM

## 2025-04-16 DIAGNOSIS — Z36.89 ENCOUNTER FOR ULTRASOUND TO ASSESS FETAL GROWTH: ICD-10-CM

## 2025-04-16 DIAGNOSIS — Z3A.34 34 WEEKS GESTATION OF PREGNANCY: ICD-10-CM

## 2025-04-16 PROCEDURE — 99213 OFFICE O/P EST LOW 20 MIN: CPT | Mod: PBBFAC | Performed by: STUDENT IN AN ORGANIZED HEALTH CARE EDUCATION/TRAINING PROGRAM

## 2025-04-16 PROCEDURE — 0502F SUBSEQUENT PRENATAL CARE: CPT | Mod: CPTII,,, | Performed by: STUDENT IN AN ORGANIZED HEALTH CARE EDUCATION/TRAINING PROGRAM

## 2025-04-16 PROCEDURE — 99999 PR PBB SHADOW E&M-EST. PATIENT-LVL III: CPT | Mod: PBBFAC,,, | Performed by: STUDENT IN AN ORGANIZED HEALTH CARE EDUCATION/TRAINING PROGRAM

## 2025-04-16 RX ORDER — NIFEDIPINE 10 MG/1
10 CAPSULE ORAL EVERY 6 HOURS PRN
Qty: 30 CAPSULE | Refills: 2 | Status: SHIPPED | OUTPATIENT
Start: 2025-04-16 | End: 2026-04-16

## 2025-04-17 ENCOUNTER — HOSPITAL ENCOUNTER (EMERGENCY)
Facility: HOSPITAL | Age: 25
Discharge: HOME | End: 2025-04-17
Attending: OBSTETRICS & GYNECOLOGY
Payer: COMMERCIAL

## 2025-04-17 ENCOUNTER — ROUTINE PRENATAL (OUTPATIENT)
Dept: OBSTETRICS AND GYNECOLOGY | Facility: CLINIC | Age: 25
End: 2025-04-17
Payer: COMMERCIAL

## 2025-04-17 VITALS
HEART RATE: 86 BPM | HEIGHT: 67 IN | DIASTOLIC BLOOD PRESSURE: 68 MMHG | RESPIRATION RATE: 20 BRPM | SYSTOLIC BLOOD PRESSURE: 104 MMHG | WEIGHT: 179.88 LBS | TEMPERATURE: 98 F | BODY MASS INDEX: 28.23 KG/M2

## 2025-04-17 VITALS
SYSTOLIC BLOOD PRESSURE: 108 MMHG | WEIGHT: 178 LBS | HEART RATE: 70 BPM | BODY MASS INDEX: 27.88 KG/M2 | DIASTOLIC BLOOD PRESSURE: 64 MMHG

## 2025-04-17 DIAGNOSIS — O47.9 UTERINE CONTRACTIONS DURING PREGNANCY: Primary | ICD-10-CM

## 2025-04-17 DIAGNOSIS — Z3A.35 35 WEEKS GESTATION OF PREGNANCY: ICD-10-CM

## 2025-04-17 DIAGNOSIS — G90.A POTS (POSTURAL ORTHOSTATIC TACHYCARDIA SYNDROME): ICD-10-CM

## 2025-04-17 DIAGNOSIS — N76.0 BACTERIAL VAGINOSIS: ICD-10-CM

## 2025-04-17 DIAGNOSIS — Z98.891 HISTORY OF CESAREAN SECTION: ICD-10-CM

## 2025-04-17 DIAGNOSIS — O26.893 VAGINAL DISCHARGE DURING PREGNANCY IN THIRD TRIMESTER: ICD-10-CM

## 2025-04-17 DIAGNOSIS — O34.219 HISTORY OF CESAREAN DELIVERY, CURRENTLY PREGNANT: Primary | ICD-10-CM

## 2025-04-17 DIAGNOSIS — B96.89 BACTERIAL VAGINOSIS: ICD-10-CM

## 2025-04-17 DIAGNOSIS — N89.8 VAGINAL DISCHARGE DURING PREGNANCY IN THIRD TRIMESTER: ICD-10-CM

## 2025-04-17 LAB
BACTERIA VAG QL WET PREP: ABNORMAL /HPF
BILIRUB SERPL-MCNC: NORMAL MG/DL
BILIRUB UR QL STRIP: NEGATIVE
BLOOD URINE, POC: NORMAL
CLARITY UR: CLEAR
CLARITY, UA: NORMAL
CLUE CELLS VAG QL WET PREP: ABNORMAL /HPF
COLOR UR: ABNORMAL
COLOR, UA: NORMAL
CTP QC/QA: YES
GLUCOSE UR QL STRIP: NORMAL
GLUCOSE UR STRIP-MCNC: NORMAL MG/DL
KETONES UR QL STRIP: NORMAL
KETONES UR STRIP-SCNC: NEGATIVE MG/DL
LEUKOCYTE ESTERASE UR QL STRIP: ABNORMAL
LEUKOCYTE ESTERASE URINE, POC: NORMAL
MUCOUS, UA: ABNORMAL /LPF
NITRITE UR QL STRIP: NEGATIVE
NITRITE, POC UA: NORMAL
PH UR STRIP: 6.5 PH UNITS
PH, POC UA: 7
PROT UR QL STRIP: NEGATIVE
PROTEIN, POC: NORMAL
RBC # UR STRIP: NEGATIVE /UL
RBC #/AREA URNS HPF: 2 /HPF
RBC #/AREA VAG WET PREP: ABNORMAL /HPF
ROMPLUS TEST: NEGATIVE
SP GR UR STRIP: 1.01
SPECIFIC GRAVITY, POC UA: 1
SQUAMOUS #/AREA URNS LPF: ABNORMAL /HPF
SQUAMOUS EPITHELIALS WET WOUNT, GENITAL: ABNORMAL /HPF
T VAGINALIS VAG QL WET PREP: ABNORMAL /HPF
UROBILINOGEN UR STRIP-ACNC: NORMAL MG/DL
UROBILINOGEN, POC UA: 0.2
WBC #/AREA URNS HPF: 1 /HPF
WBC CLUMPS WET MOUNT, GENITAL: ABNORMAL /HPF
WBC VAG QL WET PREP: ABNORMAL /HPF
YEAST VAG QL WET PREP: ABNORMAL /HPF

## 2025-04-17 PROCEDURE — 99284 EMERGENCY DEPT VISIT MOD MDM: CPT

## 2025-04-17 PROCEDURE — 81003 URINALYSIS AUTO W/O SCOPE: CPT | Performed by: OBSTETRICS & GYNECOLOGY

## 2025-04-17 PROCEDURE — 0502F SUBSEQUENT PRENATAL CARE: CPT | Mod: CPTII,,, | Performed by: STUDENT IN AN ORGANIZED HEALTH CARE EDUCATION/TRAINING PROGRAM

## 2025-04-17 PROCEDURE — 99283 EMERGENCY DEPT VISIT LOW MDM: CPT | Mod: ,,, | Performed by: OBSTETRICS & GYNECOLOGY

## 2025-04-17 PROCEDURE — 84112 EVAL AMNIOTIC FLUID PROTEIN: CPT

## 2025-04-17 PROCEDURE — 87210 SMEAR WET MOUNT SALINE/INK: CPT | Performed by: OBSTETRICS & GYNECOLOGY

## 2025-04-17 PROCEDURE — 99213 OFFICE O/P EST LOW 20 MIN: CPT | Mod: PBBFAC | Performed by: STUDENT IN AN ORGANIZED HEALTH CARE EDUCATION/TRAINING PROGRAM

## 2025-04-17 PROCEDURE — 99999 PR PBB SHADOW E&M-EST. PATIENT-LVL III: CPT | Mod: PBBFAC,,, | Performed by: STUDENT IN AN ORGANIZED HEALTH CARE EDUCATION/TRAINING PROGRAM

## 2025-04-17 RX ORDER — METRONIDAZOLE 7.5 MG/G
1 GEL VAGINAL NIGHTLY
Qty: 5 APPLICATOR | Refills: 0 | Status: SHIPPED | OUTPATIENT
Start: 2025-04-17 | End: 2025-04-22

## 2025-04-17 NOTE — ED PROVIDER NOTES
Encounter Date: 2025       History     Chief Complaint   Patient presents with    Contractions     25 yo  c IUP @ 35+2 weeks presents c/o contractions q 5 minutes that started this afternoon. Ctx worsen when she is standing and resolves when she sits. She is also c/o increased vaginal discharge and is unsure if her water broke. She has a h/o C/S x 1 and desires a TOLAC.   Pregnancy has been complicated by POTS.  She was given Procardia from Dr. Tavera for contractions. However, she took it once and then she had tachycardia and felt faint. She has not taken it since then.    The history is provided by the patient. No  was used.     Review of patient's allergies indicates:   Allergen Reactions    Acetaminophen     Oxycodone hcl     Percocet [oxycodone-acetaminophen] Itching     Past Medical History:   Diagnosis Date    Acne     Hypoglycemia      Past Surgical History:   Procedure Laterality Date     SECTION N/A 2022    Procedure:  SECTION;  Surgeon: Boby Tavera DO;  Location: [facility] L&D;  Service: OB/GYN;  Laterality: N/A;     Family History   Problem Relation Name Age of Onset    No Known Problems Father      Heart disease Mother      Stroke Mother      Protein S deficiency Mother      Clotting disorder Mother      No Known Problems Brother      No Known Problems Sister      No Known Problems Brother      Clotting disorder Sister      No Known Problems Sister       Social History[1]  Review of Systems   Constitutional:  Negative for fatigue.   Respiratory:  Negative for shortness of breath.    Cardiovascular:  Negative for chest pain.   Gastrointestinal:  Positive for abdominal pain.   Musculoskeletal:  Positive for back pain.       Physical Exam     Initial Vitals   BP Pulse Resp Temp SpO2   -- -- -- -- --      MAP       --         Physical Exam    Vitals reviewed.  Constitutional: She appears well-developed and well-nourished.   HENT:   Head:  Normocephalic and atraumatic.   Cardiovascular:  Normal rate.           Pulmonary/Chest:   Unlabored breathing   Abdominal: Abdomen is soft. There is no abdominal tenderness.   gravid     Neurological: She is alert and oriented to person, place, and time.   Skin: Skin is warm and dry.   Psychiatric: She has a normal mood and affect. Thought content normal.     OB LABOR EXAM:   Pre-Term Labor: No.   Membranes ruptured: No.   Method: Sterile speculum exam per MD and Sterile vaginal exam per MD.   Vaginal Bleeding: none present.     Dilatation: 1.   Station: -3.   Effacement: 50%.   Amniotic Fluid Color: no fluid.     Comments: SSE: No pooling, negative valsalva. Moderate amount white vaginal discharge. Wet prep obtained. ROM plus performed.   SVE: ft/50/-3.       ED Course   Procedures  Labs Reviewed   WET PREP, GENITAL   URINALYSIS, REFLEX TO URINE CULTURE   POCT ROM PLUS          Imaging Results    None          Medications - No data to display  Medical Decision Making  25 yo  c IUP @ 35+2 weeks presents c/o contractions q 5 minutes that started this afternoon. Ctx worsen when she is standing and resolves when she sits. She is also c/o increased vaginal discharge and is unsure if her water broke. She has a h/o C/S x 1 and desires a TOLAC.     Exam showed vaginal discharge, but no evidence of ROM.   ROM plus performed and was negative.  Wet prep performed and resulted +BV. Rx for Metronidazole vaginal gel provided.    Stable to d/c home.   Fetal kick counts/labor precautions discussed.   F/u with Dr. Tavera in 1 week as scheduled.      Amount and/or Complexity of Data Reviewed  Labs:  Decision-making details documented in ED Course.    Risk  Prescription drug management.               ED Course as of 25 1901   Thu  Wet Prep, Genital (Vaginal Screen) [AS]    POCT ROM Plus [AS]      ED Course User Index  [AS] Mauro Fatima MD                           Clinical Impression:  Final  diagnoses:  [O47.9] Uterine contractions during pregnancy (Primary)  [O26.893, N89.8] Vaginal discharge during pregnancy in third trimester  [G90.A] POTS (postural orthostatic tachycardia syndrome)  [Z98.891] History of  section  [Z3A.35] 35 weeks gestation of pregnancy                   Mauro Fatima MD  25         [1]   Social History  Tobacco Use    Smoking status: Never    Smokeless tobacco: Never   Substance Use Topics    Alcohol use: Never    Drug use: Not Currently        Mauro Fatima MD  25

## 2025-04-17 NOTE — ED NOTES
Dr. Fatima notified that lab results are posted. MD reviews results, states she will discuss with pt.

## 2025-04-17 NOTE — ED NOTES
Patient presents at 35.2 weeks with c/o contractions since this morning, possible LOF. Describes contractions as lower back pain and some tightness of abdomen. Reports positive fetal movement. No other complaints. Dr. Fatima aware of pt arrival, complaints. Dr. Fatima to bedside.

## 2025-04-17 NOTE — PROGRESS NOTES
Return OB Visit    24 y.o. female  at 35w1d   She c/o contractions that are slightly uncomfortable.   Reports good fetal movement or fluttering. Denies any vaginal bleeding, leakage of fluid, cramping, or pressure.   Total weight gain/weight loss in pregnancy: Not found.     Vitals  BP: 111/70  Pulse: 88  Weight: 80.3 kg (177 lb)  Prenatal  Fetal Heart Rate: 150  Movement: Present  Cervical Exam  Dilation: 1  Effacement (%): 50  Station: -3  Station (Labor Curve): 8 cm  Dilation/Effacement/Station  Dilation: 1  Effacement (%): 50  Station: -3    Prenatal Labs:  Lab Results   Component Value Date    GROUPTRH O POS 10/07/2024    HGB 12.6 2025    HCT 36.7 (L) 2025     2025    HEPBSAG Non-Reactive 10/07/2024    [id number] Non-Reactive 10/07/2024    LABNGO Negative 10/07/2024    LABURIN Skin/Urogenital Alexa Isolated, no further workup. 10/07/2024       A: 35w1d           ICD-10-CM ICD-9-CM    1. History of  delivery, currently pregnant  O34.219 654.20       2. POTS (postural orthostatic tachycardia syndrome)  G90.A 427.89       3. 34 weeks gestation of pregnancy  Z3A.34 V22.2       4. Frequency of urination  R35.0 788.41 POCT URINALYSIS W/O SCOPE      5. Encounter for ultrasound to assess fetal growth  Z36.89 V28.3 US OB/GYN Procedure (Viewpoint) - Extended List          P: Bleeding, daily fetal kick counts, and  labor/labor precautions discussed.    No pregnancy checklist tasks were completed during this visit, and no tasks are pending completion.      Orders Placed This Encounter   Procedures    US OB/GYN Procedure ([facility]point) - Extended List     Standing Status:   Future     Expiration Date:   2026     ANTONY::   2025     LMP::   2024     Procedures to be performed::   Transabdominal US     Release to patient:   Immediate     Rx for procardia sent to pharmacy  Questions answered to desired level of satisfaction  Verbalized understanding to all information and  instructions provided.  Follow up in about 1 week (around 4/23/2025) for domitila WILCOX.    Alyse Boswell, DO [facility] OBGYN [facility]

## 2025-04-18 NOTE — DISCHARGE INSTRUCTIONS
Please keep all scheduled follow up appointments with Dr. Tavera. Return to the hospital with any new or worsening symptoms.

## 2025-04-22 NOTE — PROGRESS NOTES
Return OB Visit    24 y.o. female  at 35w2d   She c/o irregular contractions. Reports adverse effects with procardia after one dose. States she went to L&D for evaluation but due to wait, decided to be seen in office. Declines cervical exam today.   Reports good fetal movement or fluttering. Denies any vaginal bleeding, leakage of fluid, cramping or pressure.   Total weight gain/weight loss in pregnancy: Not found.     Vitals  BP: 108/64  Pulse: 70  Weight: 80.7 kg (178 lb)  Prenatal  Movement: Present    Prenatal Labs:  Lab Results   Component Value Date    GROUPTRH O POS 10/07/2024    HGB 12.6 2025    HCT 36.7 (L) 2025     2025    HEPBSAG Non-Reactive 10/07/2024    [id number] Non-Reactive 10/07/2024    LABNGO Negative 10/07/2024    LABURIN Skin/Urogenital Alexa Isolated, no further workup. 10/07/2024       A: 35w2d           ICD-10-CM ICD-9-CM    1. History of  delivery, currently pregnant  O34.219 654.20       2. 35 weeks gestation of pregnancy  Z3A.35 V22.2           P: Bleeding, daily fetal kick counts, and  labor/labor precautions discussed.    No pregnancy checklist tasks were completed during this visit, and no tasks are pending completion.      No orders of the defined types were placed in this encounter.    Does not appear to be in active labor  Questions answered to desired level of satisfaction  Verbalized understanding to all information and instructions provided.  Keep scheduled appt for next week.    Alyse Boswell, DO F[facility]OO[facility]

## 2025-04-24 ENCOUNTER — ROUTINE PRENATAL (OUTPATIENT)
Dept: OBSTETRICS AND GYNECOLOGY | Facility: CLINIC | Age: 25
End: 2025-04-24
Payer: COMMERCIAL

## 2025-04-24 ENCOUNTER — HOSPITAL ENCOUNTER (OUTPATIENT)
Dept: OBSTETRICS AND GYNECOLOGY | Facility: CLINIC | Age: 25
Discharge: HOME | End: 2025-04-24
Attending: STUDENT IN AN ORGANIZED HEALTH CARE EDUCATION/TRAINING PROGRAM
Payer: COMMERCIAL

## 2025-04-24 VITALS
HEART RATE: 78 BPM | WEIGHT: 181 LBS | DIASTOLIC BLOOD PRESSURE: 53 MMHG | SYSTOLIC BLOOD PRESSURE: 93 MMHG | BODY MASS INDEX: 28.35 KG/M2

## 2025-04-24 DIAGNOSIS — Z36.89 ENCOUNTER FOR ULTRASOUND TO ASSESS FETAL GROWTH: ICD-10-CM

## 2025-04-24 DIAGNOSIS — O34.219 HISTORY OF CESAREAN DELIVERY, CURRENTLY PREGNANT: Primary | ICD-10-CM

## 2025-04-24 DIAGNOSIS — R35.0 FREQUENCY OF URINATION: ICD-10-CM

## 2025-04-24 DIAGNOSIS — Z3A.36 36 WEEKS GESTATION OF PREGNANCY: ICD-10-CM

## 2025-04-24 DIAGNOSIS — G90.A POTS (POSTURAL ORTHOSTATIC TACHYCARDIA SYNDROME): ICD-10-CM

## 2025-04-24 LAB
BILIRUB SERPL-MCNC: NORMAL MG/DL
BLOOD URINE, POC: NORMAL
CLARITY, UA: NORMAL
COLOR, UA: NORMAL
GLUCOSE UR QL STRIP: NORMAL
KETONES UR QL STRIP: NORMAL
LEUKOCYTE ESTERASE URINE, POC: NORMAL
NITRITE, POC UA: NORMAL
PH, POC UA: 8.5
PROTEIN, POC: NORMAL
SPECIFIC GRAVITY, POC UA: 1
UROBILINOGEN, POC UA: 0.2

## 2025-04-24 PROCEDURE — 99213 OFFICE O/P EST LOW 20 MIN: CPT | Mod: PBBFAC | Performed by: STUDENT IN AN ORGANIZED HEALTH CARE EDUCATION/TRAINING PROGRAM

## 2025-04-24 PROCEDURE — 99999 PR PBB SHADOW E&M-EST. PATIENT-LVL III: CPT | Mod: PBBFAC,,, | Performed by: STUDENT IN AN ORGANIZED HEALTH CARE EDUCATION/TRAINING PROGRAM

## 2025-04-24 PROCEDURE — 0502F SUBSEQUENT PRENATAL CARE: CPT | Mod: CPTII,,, | Performed by: STUDENT IN AN ORGANIZED HEALTH CARE EDUCATION/TRAINING PROGRAM

## 2025-04-24 NOTE — PROGRESS NOTES
Return OB Visit    24 y.o. female  at 36w2d   She c/o none.  Reports good fetal movement or fluttering. Denies any vaginal bleeding, leakage of fluid, cramping, contractions, or pressure.   Total weight gain/weight loss in pregnancy: Not found.     Vitals  BP: (!) 93/53  Pulse: 78  Weight: 82.1 kg (181 lb)  Prenatal  Fetal Heart Rate: 138  Movement: Present    Prenatal Labs:  Lab Results   Component Value Date    GROUPTRH O POS 10/07/2024    HGB 12.6 2025    HCT 36.7 (L) 2025     2025    HEPBSAG Non-Reactive 10/07/2024    [id number] Non-Reactive 10/07/2024    LABNGO Negative 10/07/2024    LABURIN Skin/Urogenital Alexa Isolated, no further workup. 10/07/2024       A: 36w2d           ICD-10-CM ICD-9-CM    1. History of  delivery, currently pregnant  O34.219 654.20       2. POTS (postural orthostatic tachycardia syndrome)  G90.A 427.89       3. Frequency of urination  R35.0 788.41 POCT URINALYSIS W/O SCOPE      4. 36 weeks gestation of pregnancy  Z3A.36 V22.2           P: Bleeding, daily fetal kick counts, and  labor/labor precautions discussed.    No pregnancy checklist tasks were completed during this visit, and no tasks are pending completion.      No orders of the defined types were placed in this encounter.    EFW 7lb  Patient leaning toward RLTCS instead of   Questions answered to desired level of satisfaction  Verbalized understanding to all information and instructions provided.  Follow up in about 1 week (around 2025) for ROB. Alyse Boswell, DO F[facility]

## 2025-05-01 ENCOUNTER — ROUTINE PRENATAL (OUTPATIENT)
Dept: OBSTETRICS AND GYNECOLOGY | Facility: CLINIC | Age: 25
End: 2025-05-01
Payer: COMMERCIAL

## 2025-05-01 ENCOUNTER — HOSPITAL ENCOUNTER (OUTPATIENT)
Dept: OBSTETRICS AND GYNECOLOGY | Facility: CLINIC | Age: 25
Discharge: HOME | End: 2025-05-01
Attending: STUDENT IN AN ORGANIZED HEALTH CARE EDUCATION/TRAINING PROGRAM
Payer: COMMERCIAL

## 2025-05-01 VITALS
SYSTOLIC BLOOD PRESSURE: 113 MMHG | DIASTOLIC BLOOD PRESSURE: 76 MMHG | BODY MASS INDEX: 28.51 KG/M2 | WEIGHT: 182 LBS | HEART RATE: 89 BPM

## 2025-05-01 DIAGNOSIS — Z3A.37 37 WEEKS GESTATION OF PREGNANCY: ICD-10-CM

## 2025-05-01 DIAGNOSIS — O36.8130 DECREASED FETAL MOVEMENTS IN THIRD TRIMESTER, SINGLE OR UNSPECIFIED FETUS: ICD-10-CM

## 2025-05-01 DIAGNOSIS — G90.A POTS (POSTURAL ORTHOSTATIC TACHYCARDIA SYNDROME): ICD-10-CM

## 2025-05-01 DIAGNOSIS — R35.0 FREQUENCY OF URINATION: ICD-10-CM

## 2025-05-01 DIAGNOSIS — O34.219 HISTORY OF CESAREAN DELIVERY, CURRENTLY PREGNANT: Primary | ICD-10-CM

## 2025-05-01 PROCEDURE — 99999 PR PBB SHADOW E&M-EST. PATIENT-LVL III: CPT | Mod: PBBFAC,,, | Performed by: STUDENT IN AN ORGANIZED HEALTH CARE EDUCATION/TRAINING PROGRAM

## 2025-05-01 PROCEDURE — 76819 FETAL BIOPHYS PROFIL W/O NST: CPT | Mod: 26,,, | Performed by: OBSTETRICS & GYNECOLOGY

## 2025-05-01 PROCEDURE — 0502F SUBSEQUENT PRENATAL CARE: CPT | Mod: CPTII,,, | Performed by: STUDENT IN AN ORGANIZED HEALTH CARE EDUCATION/TRAINING PROGRAM

## 2025-05-01 PROCEDURE — 99213 OFFICE O/P EST LOW 20 MIN: CPT | Mod: PBBFAC | Performed by: STUDENT IN AN ORGANIZED HEALTH CARE EDUCATION/TRAINING PROGRAM

## 2025-05-01 PROCEDURE — 87653 STREP B DNA AMP PROBE: CPT | Mod: ,,, | Performed by: CLINICAL MEDICAL LABORATORY

## 2025-05-02 LAB — GROUP B STREP, PCR: POSITIVE

## 2025-05-04 NOTE — PROGRESS NOTES
Return OB Visit    24 y.o. female  at 37w2d   She c/o none.  Reports good fetal movement or fluttering. Denies any vaginal bleeding, leakage of fluid, cramping, contractions, or pressure.   Total weight gain/weight loss in pregnancy: Not found.     Vitals  BP: 113/76  Pulse: 89  Weight: 82.6 kg (182 lb)  Prenatal  Movement: Present    Prenatal Labs:  Lab Results   Component Value Date    GROUPTRH O POS 10/07/2024    HGB 12.6 2025    HCT 36.7 (L) 2025     2025    HEPBSAG Non-Reactive 10/07/2024    [id number] Non-Reactive 10/07/2024    LABNGO Negative 10/07/2024    LABURIN Skin/Urogenital Alexa Isolated, no further workup. 10/07/2024       A: 37w2d           ICD-10-CM ICD-9-CM    1. History of  delivery, currently pregnant  O34.219 654.20       2. POTS (postural orthostatic tachycardia syndrome)  G90.A 427.89       3. 37 weeks gestation of pregnancy  Z3A.37 V22.2 POCT URINALYSIS W/O SCOPE      Strep B Screen, Antepartum      CANCELED: Chlamydia/GC, PCR      4. Frequency of urination  R35.0 788.41 POCT URINALYSIS W/O SCOPE      5. Decreased fetal movements in third trimester, single or unspecified fetus  O36.8130 655.73 US OB/GYN Procedure ([facility]) - Extended List - Future          P: Bleeding, daily fetal kick counts, and  labor/labor precautions discussed.    No pregnancy checklist tasks were completed during this visit, and no tasks are pending completion.      Orders Placed This Encounter   Procedures    Strep B Screen, Antepartum     Allergic to Penicillin:   No    US OB/GYN Procedure ([facility]) - Extended List - Future     Standing Status:   Future     Number of Occurrences:   1     Expected Date:   2025     Expiration Date:   2026     LMP::   2024     Procedures to be performed::   Transabdominal US              BPP     Release to patient:   Immediate       Questions answered to desired level of satisfaction  Verbalized understanding to all  information and instructions provided.  Follow up in about 1 week (around 5/8/2025).    Alyse Boswell, DO [facility]

## 2025-05-08 ENCOUNTER — ROUTINE PRENATAL (OUTPATIENT)
Dept: OBSTETRICS AND GYNECOLOGY | Facility: CLINIC | Age: 25
End: 2025-05-08
Payer: COMMERCIAL

## 2025-05-08 VITALS
WEIGHT: 183 LBS | BODY MASS INDEX: 28.66 KG/M2 | SYSTOLIC BLOOD PRESSURE: 111 MMHG | DIASTOLIC BLOOD PRESSURE: 74 MMHG | HEART RATE: 86 BPM

## 2025-05-08 DIAGNOSIS — Z3A.38 38 WEEKS GESTATION OF PREGNANCY: Primary | ICD-10-CM

## 2025-05-08 DIAGNOSIS — O34.219 HISTORY OF CESAREAN DELIVERY, CURRENTLY PREGNANT: ICD-10-CM

## 2025-05-08 PROCEDURE — 0502F SUBSEQUENT PRENATAL CARE: CPT | Mod: CPTII,,, | Performed by: STUDENT IN AN ORGANIZED HEALTH CARE EDUCATION/TRAINING PROGRAM

## 2025-05-08 PROCEDURE — 99999 PR PBB SHADOW E&M-EST. PATIENT-LVL IV: CPT | Mod: PBBFAC,,, | Performed by: STUDENT IN AN ORGANIZED HEALTH CARE EDUCATION/TRAINING PROGRAM

## 2025-05-08 PROCEDURE — 99214 OFFICE O/P EST MOD 30 MIN: CPT | Mod: PBBFAC | Performed by: STUDENT IN AN ORGANIZED HEALTH CARE EDUCATION/TRAINING PROGRAM

## 2025-05-08 RX ORDER — OXYTOCIN-SODIUM CHLORIDE 0.9% IV SOLN 30 UNIT/500ML 30-0.9/5 UT/ML-%
10 SOLUTION INTRAVENOUS ONCE AS NEEDED
OUTPATIENT
Start: 2025-05-08 | End: 2036-10-04

## 2025-05-08 RX ORDER — MISOPROSTOL 200 UG/1
800 TABLET ORAL
OUTPATIENT
Start: 2025-05-08

## 2025-05-08 RX ORDER — MUPIROCIN 20 MG/G
OINTMENT TOPICAL
OUTPATIENT
Start: 2025-05-08

## 2025-05-08 RX ORDER — FAMOTIDINE 10 MG/ML
20 INJECTION, SOLUTION INTRAVENOUS
OUTPATIENT
Start: 2025-05-08

## 2025-05-08 RX ORDER — OXYTOCIN/0.9 % SODIUM CHLORIDE 15/250 ML
95 PLASTIC BAG, INJECTION (ML) INTRAVENOUS CONTINUOUS PRN
OUTPATIENT
Start: 2025-05-08

## 2025-05-08 RX ORDER — CARBOPROST TROMETHAMINE 250 UG/ML
250 INJECTION, SOLUTION INTRAMUSCULAR
OUTPATIENT
Start: 2025-05-08

## 2025-05-08 RX ORDER — SODIUM CHLORIDE, SODIUM LACTATE, POTASSIUM CHLORIDE, CALCIUM CHLORIDE 600; 310; 30; 20 MG/100ML; MG/100ML; MG/100ML; MG/100ML
INJECTION, SOLUTION INTRAVENOUS CONTINUOUS
OUTPATIENT
Start: 2025-05-08

## 2025-05-08 RX ORDER — SODIUM CITRATE AND CITRIC ACID MONOHYDRATE 334; 500 MG/5ML; MG/5ML
30 SOLUTION ORAL
OUTPATIENT
Start: 2025-05-08

## 2025-05-08 RX ORDER — CEFAZOLIN SODIUM 2 G/50ML
2 SOLUTION INTRAVENOUS
OUTPATIENT
Start: 2025-05-08 | End: 2025-05-09

## 2025-05-08 RX ORDER — METHYLERGONOVINE MALEATE 0.2 MG/ML
200 INJECTION INTRAVENOUS
OUTPATIENT
Start: 2025-05-08

## 2025-05-08 NOTE — PROGRESS NOTES
Return OB Visit    24 y.o. female  at 38w2d   She c/o occasional ctx.  Reports good fetal movement or fluttering. Denies any vaginal bleeding, leakage of fluid, cramping, contractions, or pressure.   Total weight gain/weight loss in pregnancy: Not found.     Vitals  BP: 111/74  Pulse: 86  Weight: 83 kg (183 lb)  Prenatal  Fetal Heart Rate: 130  Movement: Present  Cervical Exam  Dilation: 1  Effacement (%): 50  Station: -3  Station (Labor Curve): 8 cm  Dilation/Effacement/Station  Dilation: 1  Effacement (%): 50  Station: -3    Prenatal Labs:  Lab Results   Component Value Date    GROUPTRH O POS 10/07/2024    HGB 12.6 2025    HCT 36.7 (L) 2025     2025    HEPBSAG Non-Reactive 10/07/2024    [id number] Non-Reactive 10/07/2024    LABNGO Negative 10/07/2024    LABURIN Skin/Urogenital Alexa Isolated, no further workup. 10/07/2024       A: 38w2d           ICD-10-CM ICD-9-CM    1. 38 weeks gestation of pregnancy  Z3A.38 V22.2 POCT URINALYSIS W/O SCOPE      2. History of  delivery, currently pregnant  O34.219 654.20 Case Request Operating Room:  SECTION      Admit to Inpatient      Vital signs      Verify informed consent      Fetal monitoring WITH contraction monitoring      [place] to [place]      Insert peripheral IV      Chlorhexidine (CHG) 2% Wipes      Clip and Prep Suprapubic      Notify NICU to attend birth      Notify Physician      Diet NPO      miSOPROStoL tablet 800 mcg      Chlorohexidine Gluconate Bath      CBC auto differential      POCT Cord Blood Gas Umbilical Artery Cord Gas      POCT Cord Blood Gas Umbilical Vein Cord Gas      Type & Screen      Inpatient consult to Anesthesiology      Full code      Place sequential compression device          P: Bleeding, daily fetal kick counts, and  labor/labor precautions discussed.    No pregnancy checklist tasks were completed during this visit, and no tasks are pending completion.      Orders Placed This Encounter    Procedures    Case Request Operating Room:  SECTION     Priority?:   Normal [2]     Requested Time:   7:30 AM     Medical Necessity::   Medically Non-Urgent [100]     Case classification:   E - Elective [90]     Is an on-site pathologist required for this procedure?:   N/A       Questions answered to desired level of satisfaction  Verbalized understanding to all information and instructions provided.  Scheduled for RLTCS on     Alyse Boswell, DO [facility]

## 2025-05-14 ENCOUNTER — HOSPITAL ENCOUNTER (INPATIENT)
Facility: HOSPITAL | Age: 25
LOS: 2 days | Discharge: HOME | End: 2025-05-16
Attending: STUDENT IN AN ORGANIZED HEALTH CARE EDUCATION/TRAINING PROGRAM | Admitting: STUDENT IN AN ORGANIZED HEALTH CARE EDUCATION/TRAINING PROGRAM
Payer: COMMERCIAL

## 2025-05-14 ENCOUNTER — ANESTHESIA EVENT (OUTPATIENT)
Dept: OBSTETRICS AND GYNECOLOGY | Facility: HOSPITAL | Age: 25
End: 2025-05-14
Payer: COMMERCIAL

## 2025-05-14 ENCOUNTER — ANESTHESIA (OUTPATIENT)
Dept: OBSTETRICS AND GYNECOLOGY | Facility: HOSPITAL | Age: 25
End: 2025-05-14
Payer: COMMERCIAL

## 2025-05-14 DIAGNOSIS — O34.219 HISTORY OF CESAREAN DELIVERY, CURRENTLY PREGNANT: ICD-10-CM

## 2025-05-14 DIAGNOSIS — O34.219 PREVIOUS CESAREAN SECTION COMPLICATING PREGNANCY, ANTEPARTUM CONDITION OR COMPLICATION: Primary | ICD-10-CM

## 2025-05-14 PROBLEM — N94.9 PAIN OF ROUND LIGAMENT: Status: RESOLVED | Noted: 2025-03-21 | Resolved: 2025-05-14

## 2025-05-14 PROBLEM — O26.899 PAIN OF ROUND LIGAMENT AFFECTING PREGNANCY, ANTEPARTUM: Status: RESOLVED | Noted: 2025-02-23 | Resolved: 2025-05-14

## 2025-05-14 PROBLEM — O26.893 PELVIC PRESSURE IN PREGNANCY, ANTEPARTUM, THIRD TRIMESTER: Status: RESOLVED | Noted: 2022-04-29 | Resolved: 2025-05-14

## 2025-05-14 PROBLEM — Z3A.27 27 WEEKS GESTATION OF PREGNANCY: Status: RESOLVED | Noted: 2025-02-23 | Resolved: 2025-05-14

## 2025-05-14 PROBLEM — R10.2 PELVIC PRESSURE IN PREGNANCY, ANTEPARTUM, THIRD TRIMESTER: Status: RESOLVED | Noted: 2022-04-29 | Resolved: 2025-05-14

## 2025-05-14 PROBLEM — S40.862A INSECT BITE OF LEFT ARM: Status: RESOLVED | Noted: 2025-03-20 | Resolved: 2025-05-14

## 2025-05-14 PROBLEM — R10.2 PAIN OF ROUND LIGAMENT AFFECTING PREGNANCY, ANTEPARTUM: Status: RESOLVED | Noted: 2025-02-23 | Resolved: 2025-05-14

## 2025-05-14 PROBLEM — Z3A.39 39 WEEKS GESTATION OF PREGNANCY: Status: ACTIVE | Noted: 2025-03-20

## 2025-05-14 PROBLEM — Z03.71 ENCOUNTER FOR SUSPECTED PROBLEM WITH AMNIOTIC CAVITY AND MEMBRANE RULED OUT: Status: RESOLVED | Noted: 2025-03-20 | Resolved: 2025-05-14
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
## 2025-05-14 LAB
ALBUMIN SERPL BCP-MCNC: 2.7 G/DL (ref 3.5–5)
ALBUMIN/GLOB SERPL: 0.7 {RATIO}
ALP SERPL-CCNC: 160 U/L (ref 40–150)
ALT SERPL W P-5'-P-CCNC: 11 U/L
ANION GAP SERPL CALCULATED.3IONS-SCNC: 16 MMOL/L (ref 7–16)
AST SERPL W P-5'-P-CCNC: 29 U/L (ref 11–45)
BASOPHILS # BLD AUTO: 0.03 K/UL (ref 0–0.2)
BASOPHILS NFR BLD AUTO: 0.2 % (ref 0–1)
BILIRUB SERPL-MCNC: 0.5 MG/DL
BILIRUB UR QL STRIP: NEGATIVE
BUN SERPL-MCNC: 7 MG/DL (ref 7–19)
BUN/CREAT SERPL: 11 (ref 6–20)
CALCIUM SERPL-MCNC: 8.5 MG/DL (ref 8.4–10.2)
CHLORIDE SERPL-SCNC: 107 MMOL/L (ref 98–107)
CLARITY UR: CLEAR
CO2 SERPL-SCNC: 17 MMOL/L (ref 22–29)
COLOR UR: NORMAL
CREAT SERPL-MCNC: 0.63 MG/DL (ref 0.55–1.02)
DIFFERENTIAL METHOD BLD: ABNORMAL
EGFR (NO RACE VARIABLE) (RUSH/TITUS): 127 ML/MIN/1.73M2
EOSINOPHIL # BLD AUTO: 0.1 K/UL (ref 0–0.5)
EOSINOPHIL NFR BLD AUTO: 0.7 % (ref 1–4)
ERYTHROCYTE [DISTWIDTH] IN BLOOD BY AUTOMATED COUNT: 13.2 % (ref 11.5–14.5)
GLOBULIN SER-MCNC: 3.9 G/DL (ref 2–4)
GLUCOSE SERPL-MCNC: 110 MG/DL (ref 70–105)
GLUCOSE SERPL-MCNC: 80 MG/DL (ref 74–100)
GLUCOSE UR STRIP-MCNC: NORMAL MG/DL
HCO3 UR-SCNC: 24.3 MMOL/L
HCO3 UR-SCNC: 24.7 MMOL/L (ref 18–23)
HCT VFR BLD AUTO: 37.2 % (ref 38–47)
HGB BLD-MCNC: 12.2 G/DL (ref 12–16)
HIV 1+O+2 AB SERPL QL: NORMAL
IMM GRANULOCYTES # BLD AUTO: 0.15 K/UL (ref 0–0.04)
IMM GRANULOCYTES NFR BLD: 1 % (ref 0–0.4)
INDIRECT COOMBS: NORMAL
KETONES UR STRIP-SCNC: NEGATIVE MG/DL
LEUKOCYTE ESTERASE UR QL STRIP: NEGATIVE
LYMPHOCYTES # BLD AUTO: 3.19 K/UL (ref 1–4.8)
LYMPHOCYTES NFR BLD AUTO: 22.1 % (ref 27–41)
MCH RBC QN AUTO: 29 PG (ref 27–31)
MCHC RBC AUTO-ENTMCNC: 32.8 G/DL (ref 32–36)
MCV RBC AUTO: 88.6 FL (ref 80–96)
MONOCYTES # BLD AUTO: 1.21 K/UL (ref 0–0.8)
MONOCYTES NFR BLD AUTO: 8.4 % (ref 2–6)
MPC BLD CALC-MCNC: 11.5 FL (ref 9.4–12.4)
NEUTROPHILS # BLD AUTO: 9.77 K/UL (ref 1.8–7.7)
NEUTROPHILS NFR BLD AUTO: 67.6 % (ref 53–65)
NITRITE UR QL STRIP: NEGATIVE
NRBC # BLD AUTO: 0 X10E3/UL
NRBC, AUTO (.00): 0 %
PCO2 BLDA: 42 MMHG
PCO2 BLDA: 48 MMHG
PH SMN: 7.32 [PH] (ref 7.35–7.45)
PH SMN: 7.37 [PH]
PH UR STRIP: 6.5 PH UNITS
PLATELET # BLD AUTO: 214 K/UL (ref 150–400)
PO2 BLDA: 29 MMHG
PO2 BLDA: 31 MMHG
POC A-ADO2: ABNORMAL MMHG
POC BASE EXCESS: -1.1 MMOL/L
POC BASE EXCESS: -1.8 MMOL/L
POC SATURATED O2: 60 %
POC SATURATED O2: 66.4 % (ref 95–98)
POTASSIUM SERPL-SCNC: 3.8 MMOL/L (ref 3.5–5.1)
PROT SERPL-MCNC: 6.6 G/DL (ref 6.4–8.3)
PROT UR QL STRIP: NEGATIVE
RBC # BLD AUTO: 4.2 M/UL (ref 4.2–5.4)
RBC # UR STRIP: NEGATIVE /UL
RH BLD: NORMAL
SODIUM SERPL-SCNC: 136 MMOL/L (ref 136–145)
SP GR UR STRIP: 1.02
SPECIMEN OUTDATE: NORMAL
SYPHILIS AB INTERPRETATION: NORMAL
UROBILINOGEN UR STRIP-ACNC: NORMAL MG/DL
WBC # BLD AUTO: 14.45 K/UL (ref 4.5–11)

## 2025-05-14 PROCEDURE — 86780 TREPONEMA PALLIDUM: CPT | Performed by: STUDENT IN AN ORGANIZED HEALTH CARE EDUCATION/TRAINING PROGRAM

## 2025-05-14 PROCEDURE — 99900035 HC TECH TIME PER 15 MIN (STAT)

## 2025-05-14 PROCEDURE — 25000003 PHARM REV CODE 250: Performed by: NURSE ANESTHETIST, CERTIFIED REGISTERED

## 2025-05-14 PROCEDURE — 36415 COLL VENOUS BLD VENIPUNCTURE: CPT | Performed by: STUDENT IN AN ORGANIZED HEALTH CARE EDUCATION/TRAINING PROGRAM

## 2025-05-14 PROCEDURE — 25000003 PHARM REV CODE 250: Performed by: STUDENT IN AN ORGANIZED HEALTH CARE EDUCATION/TRAINING PROGRAM

## 2025-05-14 PROCEDURE — 85025 COMPLETE CBC W/AUTO DIFF WBC: CPT | Performed by: STUDENT IN AN ORGANIZED HEALTH CARE EDUCATION/TRAINING PROGRAM

## 2025-05-14 PROCEDURE — 81003 URINALYSIS AUTO W/O SCOPE: CPT | Performed by: STUDENT IN AN ORGANIZED HEALTH CARE EDUCATION/TRAINING PROGRAM

## 2025-05-14 PROCEDURE — 37000008 HC ANESTHESIA 1ST 15 MINUTES: Performed by: STUDENT IN AN ORGANIZED HEALTH CARE EDUCATION/TRAINING PROGRAM

## 2025-05-14 PROCEDURE — 11000001 HC ACUTE MED/SURG PRIVATE ROOM

## 2025-05-14 PROCEDURE — 37000009 HC ANESTHESIA EA ADD 15 MINS: Performed by: STUDENT IN AN ORGANIZED HEALTH CARE EDUCATION/TRAINING PROGRAM

## 2025-05-14 PROCEDURE — 27000980 HC MATTRESS, OVERLAY WAFFLE

## 2025-05-14 PROCEDURE — 36004724 HC OB OR TIME LEV III - 1ST 15 MIN: Performed by: STUDENT IN AN ORGANIZED HEALTH CARE EDUCATION/TRAINING PROGRAM

## 2025-05-14 PROCEDURE — 25000003 PHARM REV CODE 250: Performed by: ANESTHESIOLOGY

## 2025-05-14 PROCEDURE — 71000039 HC RECOVERY, EACH ADD'L HOUR: Performed by: STUDENT IN AN ORGANIZED HEALTH CARE EDUCATION/TRAINING PROGRAM

## 2025-05-14 PROCEDURE — 63600175 PHARM REV CODE 636 W HCPCS: Performed by: ANESTHESIOLOGY

## 2025-05-14 PROCEDURE — 27201423 OPTIME MED/SURG SUP & DEVICES STERILE SUPPLY: Performed by: STUDENT IN AN ORGANIZED HEALTH CARE EDUCATION/TRAINING PROGRAM

## 2025-05-14 PROCEDURE — 86850 RBC ANTIBODY SCREEN: CPT | Performed by: STUDENT IN AN ORGANIZED HEALTH CARE EDUCATION/TRAINING PROGRAM

## 2025-05-14 PROCEDURE — 87389 HIV-1 AG W/HIV-1&-2 AB AG IA: CPT | Performed by: STUDENT IN AN ORGANIZED HEALTH CARE EDUCATION/TRAINING PROGRAM

## 2025-05-14 PROCEDURE — 94799 UNLISTED PULMONARY SVC/PX: CPT

## 2025-05-14 PROCEDURE — 59510 CESAREAN DELIVERY: CPT | Mod: ,,, | Performed by: STUDENT IN AN ORGANIZED HEALTH CARE EDUCATION/TRAINING PROGRAM

## 2025-05-14 PROCEDURE — 80053 COMPREHEN METABOLIC PANEL: CPT | Performed by: STUDENT IN AN ORGANIZED HEALTH CARE EDUCATION/TRAINING PROGRAM

## 2025-05-14 PROCEDURE — 82803 BLOOD GASES ANY COMBINATION: CPT

## 2025-05-14 PROCEDURE — 71000033 HC RECOVERY, INTIAL HOUR: Performed by: STUDENT IN AN ORGANIZED HEALTH CARE EDUCATION/TRAINING PROGRAM

## 2025-05-14 PROCEDURE — 63600175 PHARM REV CODE 636 W HCPCS: Performed by: NURSE ANESTHETIST, CERTIFIED REGISTERED

## 2025-05-14 PROCEDURE — 63600175 PHARM REV CODE 636 W HCPCS: Mod: JZ,TB | Performed by: STUDENT IN AN ORGANIZED HEALTH CARE EDUCATION/TRAINING PROGRAM

## 2025-05-14 PROCEDURE — 36004725 HC OB OR TIME LEV III - EA ADD 15 MIN: Performed by: STUDENT IN AN ORGANIZED HEALTH CARE EDUCATION/TRAINING PROGRAM

## 2025-05-14 PROCEDURE — 51702 INSERT TEMP BLADDER CATH: CPT

## 2025-05-14 PROCEDURE — 82962 GLUCOSE BLOOD TEST: CPT

## 2025-05-14 RX ORDER — CEFAZOLIN 2 G/1
2 INJECTION, POWDER, FOR SOLUTION INTRAMUSCULAR; INTRAVENOUS
Status: DISCONTINUED | OUTPATIENT
Start: 2025-05-14 | End: 2025-05-14

## 2025-05-14 RX ORDER — SODIUM CHLORIDE, SODIUM LACTATE, POTASSIUM CHLORIDE, CALCIUM CHLORIDE 600; 310; 30; 20 MG/100ML; MG/100ML; MG/100ML; MG/100ML
INJECTION, SOLUTION INTRAVENOUS CONTINUOUS
Status: DISCONTINUED | OUTPATIENT
Start: 2025-05-14 | End: 2025-05-14

## 2025-05-14 RX ORDER — METHYLERGONOVINE MALEATE 0.2 MG/ML
200 INJECTION INTRAVENOUS ONCE AS NEEDED
Status: DISCONTINUED | OUTPATIENT
Start: 2025-05-14 | End: 2025-05-16 | Stop reason: HOSPADM

## 2025-05-14 RX ORDER — ACETAMINOPHEN 325 MG/1
650 TABLET ORAL EVERY 6 HOURS
Status: DISCONTINUED | OUTPATIENT
Start: 2025-05-14 | End: 2025-05-14

## 2025-05-14 RX ORDER — SIMETHICONE 80 MG
1 TABLET,CHEWABLE ORAL EVERY 6 HOURS PRN
Status: DISCONTINUED | OUTPATIENT
Start: 2025-05-14 | End: 2025-05-16 | Stop reason: HOSPADM

## 2025-05-14 RX ORDER — PROCHLORPERAZINE EDISYLATE 5 MG/ML
5 INJECTION INTRAMUSCULAR; INTRAVENOUS EVERY 6 HOURS PRN
Status: DISCONTINUED | OUTPATIENT
Start: 2025-05-14 | End: 2025-05-16 | Stop reason: HOSPADM

## 2025-05-14 RX ORDER — MORPHINE SULFATE 1 MG/ML
INJECTION, SOLUTION EPIDURAL; INTRATHECAL; INTRAVENOUS
Status: DISCONTINUED | OUTPATIENT
Start: 2025-05-14 | End: 2025-05-14

## 2025-05-14 RX ORDER — OXYTOCIN 10 [USP'U]/ML
INJECTION, SOLUTION INTRAMUSCULAR; INTRAVENOUS
Status: DISCONTINUED | OUTPATIENT
Start: 2025-05-14 | End: 2025-05-14

## 2025-05-14 RX ORDER — KETOROLAC TROMETHAMINE 30 MG/ML
INJECTION, SOLUTION INTRAMUSCULAR; INTRAVENOUS
Status: DISCONTINUED | OUTPATIENT
Start: 2025-05-14 | End: 2025-05-14

## 2025-05-14 RX ORDER — BISACODYL 10 MG/1
10 SUPPOSITORY RECTAL ONCE AS NEEDED
Status: DISCONTINUED | OUTPATIENT
Start: 2025-05-14 | End: 2025-05-16 | Stop reason: HOSPADM

## 2025-05-14 RX ORDER — KETOROLAC TROMETHAMINE 30 MG/ML
30 INJECTION, SOLUTION INTRAMUSCULAR; INTRAVENOUS EVERY 6 HOURS
Status: DISCONTINUED | OUTPATIENT
Start: 2025-05-14 | End: 2025-05-14

## 2025-05-14 RX ORDER — DIPHENOXYLATE HYDROCHLORIDE AND ATROPINE SULFATE 2.5; .025 MG/1; MG/1
2 TABLET ORAL EVERY 6 HOURS PRN
Status: DISCONTINUED | OUTPATIENT
Start: 2025-05-14 | End: 2025-05-16 | Stop reason: HOSPADM

## 2025-05-14 RX ORDER — ACETAMINOPHEN 325 MG/1
650 TABLET ORAL EVERY 6 HOURS PRN
Status: DISCONTINUED | OUTPATIENT
Start: 2025-05-14 | End: 2025-05-15

## 2025-05-14 RX ORDER — METHYLERGONOVINE MALEATE 0.2 MG/ML
200 INJECTION INTRAVENOUS
Status: DISCONTINUED | OUTPATIENT
Start: 2025-05-14 | End: 2025-05-14

## 2025-05-14 RX ORDER — OXYTOCIN-SODIUM CHLORIDE 0.9% IV SOLN 30 UNIT/500ML 30-0.9/5 UT/ML-%
10 SOLUTION INTRAVENOUS ONCE AS NEEDED
Status: DISCONTINUED | OUTPATIENT
Start: 2025-05-14 | End: 2025-05-14

## 2025-05-14 RX ORDER — HYDROCODONE BITARTRATE AND ACETAMINOPHEN 5; 325 MG/1; MG/1
1 TABLET ORAL EVERY 4 HOURS PRN
Status: DISCONTINUED | OUTPATIENT
Start: 2025-05-14 | End: 2025-05-16 | Stop reason: HOSPADM

## 2025-05-14 RX ORDER — SODIUM CHLORIDE, SODIUM LACTATE, POTASSIUM CHLORIDE, CALCIUM CHLORIDE 600; 310; 30; 20 MG/100ML; MG/100ML; MG/100ML; MG/100ML
INJECTION, SOLUTION INTRAVENOUS CONTINUOUS
Status: DISCONTINUED | OUTPATIENT
Start: 2025-05-14 | End: 2025-05-16 | Stop reason: HOSPADM

## 2025-05-14 RX ORDER — ONDANSETRON HYDROCHLORIDE 2 MG/ML
4 INJECTION, SOLUTION INTRAVENOUS EVERY 6 HOURS PRN
Status: ACTIVE | OUTPATIENT
Start: 2025-05-14 | End: 2025-05-15

## 2025-05-14 RX ORDER — OXYTOCIN/0.9 % SODIUM CHLORIDE 15/250 ML
95 PLASTIC BAG, INJECTION (ML) INTRAVENOUS ONCE AS NEEDED
Status: DISCONTINUED | OUTPATIENT
Start: 2025-05-14 | End: 2025-05-14

## 2025-05-14 RX ORDER — DOCUSATE SODIUM 100 MG/1
200 CAPSULE, LIQUID FILLED ORAL 2 TIMES DAILY
Status: DISCONTINUED | OUTPATIENT
Start: 2025-05-14 | End: 2025-05-16 | Stop reason: HOSPADM

## 2025-05-14 RX ORDER — PRENATAL WITH FERROUS FUM AND FOLIC ACID 3080; 920; 120; 400; 22; 1.84; 3; 20; 10; 1; 12; 200; 27; 25; 2 [IU]/1; [IU]/1; MG/1; [IU]/1; MG/1; MG/1; MG/1; MG/1; MG/1; MG/1; UG/1; MG/1; MG/1; MG/1; MG/1
1 TABLET ORAL DAILY
Status: DISCONTINUED | OUTPATIENT
Start: 2025-05-14 | End: 2025-05-16 | Stop reason: HOSPADM

## 2025-05-14 RX ORDER — IBUPROFEN 800 MG/1
800 TABLET, FILM COATED ORAL EVERY 8 HOURS
Status: DISCONTINUED | OUTPATIENT
Start: 2025-05-15 | End: 2025-05-16 | Stop reason: HOSPADM

## 2025-05-14 RX ORDER — HYDROCODONE BITARTRATE AND ACETAMINOPHEN 7.5; 325 MG/1; MG/1
1 TABLET ORAL EVERY 4 HOURS PRN
Status: DISCONTINUED | OUTPATIENT
Start: 2025-05-14 | End: 2025-05-16 | Stop reason: HOSPADM

## 2025-05-14 RX ORDER — OXYTOCIN-SODIUM CHLORIDE 0.9% IV SOLN 30 UNIT/500ML 30-0.9/5 UT/ML-%
20 SOLUTION INTRAVENOUS ONCE AS NEEDED
Status: DISCONTINUED | OUTPATIENT
Start: 2025-05-14 | End: 2025-05-14

## 2025-05-14 RX ORDER — ADHESIVE BANDAGE
30 BANDAGE TOPICAL 2 TIMES DAILY PRN
Status: DISCONTINUED | OUTPATIENT
Start: 2025-05-15 | End: 2025-05-16 | Stop reason: HOSPADM

## 2025-05-14 RX ORDER — OXYCODONE HYDROCHLORIDE 5 MG/1
5 TABLET ORAL EVERY 4 HOURS PRN
Status: DISCONTINUED | OUTPATIENT
Start: 2025-05-14 | End: 2025-05-14

## 2025-05-14 RX ORDER — SODIUM CITRATE AND CITRIC ACID MONOHYDRATE 334; 500 MG/5ML; MG/5ML
30 SOLUTION ORAL
Status: DISCONTINUED | OUTPATIENT
Start: 2025-05-14 | End: 2025-05-14

## 2025-05-14 RX ORDER — KETOROLAC TROMETHAMINE 30 MG/ML
30 INJECTION, SOLUTION INTRAMUSCULAR; INTRAVENOUS EVERY 8 HOURS
Status: COMPLETED | OUTPATIENT
Start: 2025-05-14 | End: 2025-05-15

## 2025-05-14 RX ORDER — OXYTOCIN 10 [USP'U]/ML
10 INJECTION, SOLUTION INTRAMUSCULAR; INTRAVENOUS ONCE AS NEEDED
Status: DISCONTINUED | OUTPATIENT
Start: 2025-05-14 | End: 2025-05-16 | Stop reason: HOSPADM

## 2025-05-14 RX ORDER — CEFAZOLIN 2 G/1
2 INJECTION, POWDER, FOR SOLUTION INTRAMUSCULAR; INTRAVENOUS
Status: COMPLETED | OUTPATIENT
Start: 2025-05-14 | End: 2025-05-14

## 2025-05-14 RX ORDER — ONDANSETRON HYDROCHLORIDE 2 MG/ML
4 INJECTION, SOLUTION INTRAVENOUS EVERY 6 HOURS PRN
Status: DISCONTINUED | OUTPATIENT
Start: 2025-05-14 | End: 2025-05-14

## 2025-05-14 RX ORDER — CARBOPROST TROMETHAMINE 250 UG/ML
250 INJECTION, SOLUTION INTRAMUSCULAR
Status: DISCONTINUED | OUTPATIENT
Start: 2025-05-14 | End: 2025-05-16 | Stop reason: HOSPADM

## 2025-05-14 RX ORDER — OXYTOCIN/0.9 % SODIUM CHLORIDE 15/250 ML
95 PLASTIC BAG, INJECTION (ML) INTRAVENOUS CONTINUOUS PRN
Status: DISCONTINUED | OUTPATIENT
Start: 2025-05-14 | End: 2025-05-14

## 2025-05-14 RX ORDER — TRANEXAMIC ACID 10 MG/ML
1000 INJECTION, SOLUTION INTRAVENOUS EVERY 30 MIN PRN
Status: DISCONTINUED | OUTPATIENT
Start: 2025-05-14 | End: 2025-05-16 | Stop reason: HOSPADM

## 2025-05-14 RX ORDER — ONDANSETRON HYDROCHLORIDE 2 MG/ML
INJECTION, SOLUTION INTRAVENOUS
Status: DISCONTINUED | OUTPATIENT
Start: 2025-05-14 | End: 2025-05-14

## 2025-05-14 RX ORDER — FAMOTIDINE 10 MG/ML
20 INJECTION, SOLUTION INTRAVENOUS
Status: DISCONTINUED | OUTPATIENT
Start: 2025-05-14 | End: 2025-05-14

## 2025-05-14 RX ORDER — OXYCODONE HYDROCHLORIDE 5 MG/1
10 TABLET ORAL EVERY 4 HOURS PRN
Status: DISCONTINUED | OUTPATIENT
Start: 2025-05-14 | End: 2025-05-14

## 2025-05-14 RX ORDER — MISOPROSTOL 200 UG/1
800 TABLET ORAL
Status: DISCONTINUED | OUTPATIENT
Start: 2025-05-14 | End: 2025-05-14

## 2025-05-14 RX ORDER — ONDANSETRON 4 MG/1
8 TABLET, ORALLY DISINTEGRATING ORAL EVERY 8 HOURS PRN
Status: DISCONTINUED | OUTPATIENT
Start: 2025-05-14 | End: 2025-05-16 | Stop reason: HOSPADM

## 2025-05-14 RX ORDER — ONDANSETRON HYDROCHLORIDE 4 MG/5ML
4 SOLUTION ORAL EVERY 6 HOURS PRN
Status: DISCONTINUED | OUTPATIENT
Start: 2025-05-14 | End: 2025-05-14

## 2025-05-14 RX ORDER — MISOPROSTOL 200 UG/1
800 TABLET ORAL ONCE AS NEEDED
Status: DISCONTINUED | OUTPATIENT
Start: 2025-05-14 | End: 2025-05-16 | Stop reason: HOSPADM

## 2025-05-14 RX ORDER — BUPIVACAINE HYDROCHLORIDE 7.5 MG/ML
INJECTION, SOLUTION EPIDURAL; RETROBULBAR
Status: COMPLETED | OUTPATIENT
Start: 2025-05-14 | End: 2025-05-14

## 2025-05-14 RX ORDER — CARBOPROST TROMETHAMINE 250 UG/ML
250 INJECTION, SOLUTION INTRAMUSCULAR
Status: DISCONTINUED | OUTPATIENT
Start: 2025-05-14 | End: 2025-05-14

## 2025-05-14 RX ORDER — EPHEDRINE SULFATE 50 MG/ML
INJECTION, SOLUTION INTRAVENOUS
Status: DISCONTINUED | OUTPATIENT
Start: 2025-05-14 | End: 2025-05-14

## 2025-05-14 RX ORDER — DIPHENHYDRAMINE HCL 25 MG
25 CAPSULE ORAL EVERY 4 HOURS PRN
Status: DISCONTINUED | OUTPATIENT
Start: 2025-05-14 | End: 2025-05-16 | Stop reason: HOSPADM

## 2025-05-14 RX ORDER — EPINEPHRINE 0.1 MG/ML
INJECTION INTRAVENOUS
Status: DISCONTINUED | OUTPATIENT
Start: 2025-05-14 | End: 2025-05-14

## 2025-05-14 RX ORDER — AMOXICILLIN 250 MG
1 CAPSULE ORAL NIGHTLY PRN
Status: DISCONTINUED | OUTPATIENT
Start: 2025-05-14 | End: 2025-05-16 | Stop reason: HOSPADM

## 2025-05-14 RX ADMIN — CEFAZOLIN 2 G: 2 INJECTION, POWDER, FOR SOLUTION INTRAMUSCULAR; INTRAVENOUS at 03:05

## 2025-05-14 RX ADMIN — KETOROLAC TROMETHAMINE 30 MG: 30 INJECTION, SOLUTION INTRAMUSCULAR at 03:05

## 2025-05-14 RX ADMIN — EPINEPHRINE 0.1 MCG: 0.1 INJECTION, SOLUTION ENDOTRACHEAL; INTRACARDIAC; INTRAVENOUS at 07:05

## 2025-05-14 RX ADMIN — CEFAZOLIN 2 G: 2 INJECTION, POWDER, FOR SOLUTION INTRAMUSCULAR; INTRAVENOUS at 10:05

## 2025-05-14 RX ADMIN — KETOROLAC TROMETHAMINE 30 MG: 30 INJECTION, SOLUTION INTRAMUSCULAR at 09:05

## 2025-05-14 RX ADMIN — ONDANSETRON 8 MG: 2 INJECTION INTRAMUSCULAR; INTRAVENOUS at 07:05

## 2025-05-14 RX ADMIN — ACETAMINOPHEN 650 MG: 325 TABLET ORAL at 09:05

## 2025-05-14 RX ADMIN — OXYTOCIN 30 UNITS: 10 INJECTION, SOLUTION INTRAMUSCULAR; INTRAVENOUS at 08:05

## 2025-05-14 RX ADMIN — CEFAZOLIN 2 G: 2 INJECTION, POWDER, FOR SOLUTION INTRAMUSCULAR; INTRAVENOUS at 06:05

## 2025-05-14 RX ADMIN — OXYTOCIN 15 UNITS: 10 INJECTION, SOLUTION INTRAMUSCULAR; INTRAVENOUS at 08:05

## 2025-05-14 RX ADMIN — SODIUM CHLORIDE, POTASSIUM CHLORIDE, SODIUM LACTATE AND CALCIUM CHLORIDE: 600; 310; 30; 20 INJECTION, SOLUTION INTRAVENOUS at 01:05

## 2025-05-14 RX ADMIN — EPHEDRINE SULFATE 25 MG: 50 INJECTION INTRAVENOUS at 08:05

## 2025-05-14 RX ADMIN — MORPHINE SULFATE 0.25 MG: 1 INJECTION, SOLUTION EPIDURAL; INTRATHECAL; INTRAVENOUS at 08:05

## 2025-05-14 RX ADMIN — KETOROLAC TROMETHAMINE 60 MG: 30 INJECTION, SOLUTION INTRAMUSCULAR at 08:05

## 2025-05-14 RX ADMIN — DOCUSATE SODIUM 200 MG: 100 CAPSULE, LIQUID FILLED ORAL at 09:05

## 2025-05-14 RX ADMIN — FAMOTIDINE 20 MG: 10 INJECTION, SOLUTION INTRAVENOUS at 05:05

## 2025-05-14 RX ADMIN — SODIUM CHLORIDE, POTASSIUM CHLORIDE, SODIUM LACTATE AND CALCIUM CHLORIDE 1000 ML: 600; 310; 30; 20 INJECTION, SOLUTION INTRAVENOUS at 05:05

## 2025-05-14 RX ADMIN — BUPIVACAINE HYDROCHLORIDE 1.25 ML: 7.5 INJECTION, SOLUTION EPIDURAL; RETROBULBAR at 07:05

## 2025-05-14 RX ADMIN — OXYTOCIN 95 MILLI-UNITS/MIN: 10 INJECTION INTRAVENOUS at 09:05

## 2025-05-14 RX ADMIN — ONDANSETRON 4 MG: 2 INJECTION INTRAMUSCULAR; INTRAVENOUS at 05:05

## 2025-05-14 RX ADMIN — SODIUM CHLORIDE, POTASSIUM CHLORIDE, SODIUM LACTATE AND CALCIUM CHLORIDE: 600; 310; 30; 20 INJECTION, SOLUTION INTRAVENOUS at 06:05

## 2025-05-14 RX ADMIN — SODIUM CITRATE AND CITRIC ACID MONOHYDRATE 30 ML: 500; 334 SOLUTION ORAL at 05:05

## 2025-05-14 NOTE — ANESTHESIA PREPROCEDURE EVALUATION
05/14/2025  Stephanie Cisse is a 24 y.o., female.      Pre-op Assessment    I have reviewed the Patient Summary Reports.     I have reviewed the Nursing Notes. I have reviewed the NPO Status.   I have reviewed the Medications.     Review of Systems  Anesthesia Hx:  No problems with previous Anesthesia                Social:  Non-Smoker, No Alcohol Use       Hematology/Oncology:  Hematology Normal   Oncology Normal                                   EENT/Dental:  EENT/Dental Normal           Cardiovascular:  Cardiovascular Normal                                              Pulmonary:  Pulmonary Normal                       Renal/:  Renal/ Normal                 Hepatic/GI:  Hepatic/GI Normal                    Musculoskeletal:  Musculoskeletal Normal                OB/GYN/PEDS:  Repeat c/s           Neurological:  Neurology Normal                                      Endocrine:  Endocrine Normal            Dermatological:  Skin Normal    Psych:  Psychiatric Normal                    Physical Exam  General: Well nourished    Airway:  Mallampati: II / II  Mouth Opening: Normal  TM Distance: > 6 cm  Tongue: Normal  Neck ROM: Normal ROM    Chest/Lungs:  Clear to auscultation, Normal Respiratory Rate    Heart:  Rate: Normal  Rhythm: Regular Rhythm        Anesthesia Plan  Type of Anesthesia, risks & benefits discussed:    Anesthesia Type: Spinal  Intra-op Monitoring Plan: Standard ASA Monitors  Post Op Pain Control Plan: multimodal analgesia  Induction:  IV  Informed Consent: Informed consent signed with the Patient and all parties understand the risks and agree with anesthesia plan.  All questions answered. Patient consented to blood products? Yes  ASA Score: 2  Day of Surgery Review of History & Physical: H&P Update referred to the surgeon/provider.I have interviewed and examined the patient. I have reviewed  the patient's H&P dated:     Ready For Surgery From Anesthesia Perspective.     .

## 2025-05-14 NOTE — HPI
24 y.o.  at 39w1d presents to L&D for scheduled RLTCS. She denies vaginal bleeding, LOF, ctx. Reports good fetal movement. Prenatal care with Dr. Tavera. Pregnancy complicated by POTS. O pos, GBS pos, rubella imm.

## 2025-05-14 NOTE — ANESTHESIA PROCEDURE NOTES
Spinal    Diagnosis: repeat c/s  Patient location during procedure: OR  Start time: 5/14/2025 7:52 AM  Timeout: 5/14/2025 7:51 AM  End time: 5/14/2025 7:53 AM    Staffing  Authorizing Provider: Willam Turner MD  Performing Provider: Willam Turner MD    Staffing  Performed by: Willam Turner MD  Authorized by: Willam Turner MD    Preanesthetic Checklist  Completed: patient identified, IV checked, risks and benefits discussed, surgical consent, monitors and equipment checked, pre-op evaluation and timeout performed  Spinal Block  Patient position: sitting  Prep: Betadine  Patient monitoring: heart rate, continuous pulse ox, continuous capnometry and frequent blood pressure checks  Approach: midline  Location: L3-4  Injection technique: single shot  CSF Fluid: clear free-flowing CSF  Needle  Needle type: Quincke   Needle gauge: 22 G  Needle length: 4 in  Needle localization: anatomical landmarks  Assessment  Sensory level: T8   Dermatomal levels determined by alcohol wipe  Ease of block: easy  Patient's tolerance of the procedure: comfortable throughout block and no complaints  Medications:    Medications: BUPivacaine (pf) (MARCAINE) injection 0.75% - Intraspinal   1.25 mL - 5/14/2025 7:52:00 AM

## 2025-05-14 NOTE — H&P
[facility] -  Labor and Delivery  Obstetrics  History & Physical    Patient Name: Stephanie Bullock  MRN: [id number]  Admission Date: 2025  Primary Care Provider: Keely, Primary Doctor    Subjective:     Principal Problem:Previous  section complicating pregnancy, antepartum condition or complication    History of Present Illness:  24 y.o.  at 39w1d presents to L&D for scheduled RLTCS. She denies vaginal bleeding, LOF, ctx. Reports good fetal movement. Prenatal care with Dr. Tavera. Pregnancy complicated by POTS. O pos, GBS pos, rubella imm.        Obstetric HPI:  Patient reports None contractions, active fetal movement, No vaginal bleeding , No loss of fluid       OB History    Para Term  AB Living   2 2 2 0 0 2   SAB IAB Ectopic Multiple Live Births   0 0 0 0 2      # Outcome Date GA Lbr Misael/2nd Weight Sex Type Anes PTL Lv   2 Term 25 39w1d  3.53 kg (7 lb 12.5 oz) M CS-LTranv Spinal N SUSAN      Name: Tierra Bullock      Apgar1: 8  Apgar5: 9   1 Term 22 39w0d  3.881 kg (8 lb 8.9 oz) M CS-LTranv Spinal  SUSAN      Name: TIERRA BULLOCK      Apgar1: 9  Apgar5: 9     Past Medical History:   Diagnosis Date    Acne     Hypoglycemia      Past Surgical History:   Procedure Laterality Date     SECTION N/A 2022    Procedure:  SECTION;  Surgeon: Boby Tavera DO;  Location: [facility] L&D;  Service: OB/GYN;  Laterality: N/A;       PTA Medications   Medication Sig    atomoxetine (STRATTERA) 40 MG capsule Take 40 mg by mouth every morning. (Patient not taking: Reported on 2025)    cephALEXin (KEFLEX) 500 MG capsule Take 500 mg by mouth every 6 (six) hours. (Patient not taking: Reported on 2025)    gabapentin (NEURONTIN) 300 MG capsule Take 1 capsule (300 mg total) by mouth 2 (two) times daily. (Patient not taking: Reported on 2025)    NIFEdipine (PROCARDIA) 10 MG Cap Take 1 capsule (10 mg total) by mouth every 6 (six) hours as needed.  (Patient not taking: Reported on 5/8/2025)       Review of patient's allergies indicates:   Allergen Reactions    Acetaminophen     Oxycodone hcl     Percocet [oxycodone-acetaminophen] Itching        Family History       Problem Relation (Age of Onset)    Clotting disorder Mother, Sister    Heart disease Mother    No Known Problems Father, Brother, Sister, Brother, Sister    Protein S deficiency Mother    Stroke Mother          Tobacco Use    Smoking status: Never    Smokeless tobacco: Never   Substance and Sexual Activity    Alcohol use: Never    Drug use: Not Currently    Sexual activity: Yes     Partners: Male     Birth control/protection: None     Review of Systems   All other systems reviewed and are negative.     Objective:     Vital Signs (Most Recent):  Temp: 97.5 °F (36.4 °C) (05/14/25 0908)  Pulse: 62 (05/14/25 1246)  Resp: (!) 22 (05/14/25 0904)  BP: (!) 113/57 (05/14/25 1222)  SpO2: 97 % (05/14/25 1246) Vital Signs (24h Range):  Temp:  [97 °F (36.1 °C)-97.6 °F (36.4 °C)] 97.5 °F (36.4 °C)  Pulse:  [] 62  Resp:  [18-22] 22  SpO2:  [78 %-100 %] 97 %  BP: ()/(54-78) 113/57     Weight: 83.9 kg (185 lb)  Body mass index is 28.98 kg/m².    FHT: 135 Cat 1 (reassuring)  TOCO:  Quiet     Physical Exam:   Constitutional: She is oriented to person, place, and time. She appears well-developed and well-nourished.    HENT:   Head: Normocephalic and atraumatic.      Cardiovascular:  Normal rate.             Pulmonary/Chest: Effort normal.        Abdominal: Soft. There is no abdominal tenderness.     Genitourinary:    Uterus normal.             Musculoskeletal: Normal range of motion.       Neurological: She is alert and oriented to person, place, and time.    Skin: Skin is warm and dry.    Psychiatric: She has a normal mood and affect. Her behavior is normal. Judgment and thought content normal.        Cervix: deferred     Significant Labs:  Lab Results   Component Value Date    [facility]     HEPBSAG Non-Reactive 10/07/2024       CBC:   Recent Labs   Lab 05/14/25  0541   WBC 14.45*   RBC 4.20   HGB 12.2   HCT 37.2*      MCV 88.6   MCH 29.0   MCHC 32.8     I have personallly reviewed all pertinent lab results from the last 24 hours.  Recent Lab Results  (Last 5 results in the past 24 hours)        05/14/25  0816   05/14/25  0810   05/14/25  0559   05/14/25  0546   05/14/25  0541        POC A-aDO2               Albumin/Globulin Ratio         0.7       Albumin         2.7       ALP         160       ALT         11       Anion Gap         16       Appearance, UA       Clear         AST         29       Baso #         0.03       Basophil %         0.2       Bilirubin (UA)       Negative         BILIRUBIN TOTAL         0.5       BUN         7       BUN/CREAT RATIO         11       Calcium         8.5       Chloride         107       CO2         17       Color, UA       Light Yellow         Creatinine         0.63       Differential Method         Auto       eGFR         127  Comment: Estimated GFR calculated using the CKD-EPI creatinine (2021) equation.       Eos #         0.10       Eos %         0.7       Globulin, Total         3.9       Glucose         80       Glucose, UA       Normal         Group & Rh         O POS       Hematocrit         37.2       Hemoglobin         12.2       HIV 1/2 Ag/Ab         Non-Reactive       Immature Grans (Abs)         0.15       Immature Granulocytes         1.0       INDIRECT GIOVANY         NEG       Ketones, UA       Negative         Leukocyte Esterase, UA       Negative         Lymph #         3.19       Lymph %         22.1       MCH         29.0       MCHC         32.8       MCV         88.6       Mono #         1.21       Mono %         8.4       MPV         11.5       Neutrophils, Abs         9.77       Neutrophils Relative         67.6       NITRITE UA       Negative         nRBC         0.0       NUCLEATED RBC ABSOLUTE         0.00       Blood, UA        Negative         pH, UA       6.5         Platelet Count         214       POC Base Excess -1.1   -1.8             POC Glucose     110           POC HCO3 24.3   24.7             POC PCO2 42   48             POC PH 7.37   7.32             POC PO2 29   31             POC SATURATED O2 60.0   66.4             Potassium         3.8       PROTEIN TOTAL         6.6       Protein, UA       Negative         RBC         4.20       RDW         13.2       Sodium         136       Spec Grav UA       1.018         Specimen Outdate         2025 23:59       Syphilis Ab Interpretation         Non-Reactive  Comment: 0.0 - 0.9: Non-Reactive  0.91 - 1.10: Equivocal with RPR to follow  >1.10:  Reactive with RPR to Follow       UROBILINOGEN UA       Normal         WBC         14.45                            Assessment/Plan:     24 y.o. female  at 39w1d for:    * Previous  section complicating pregnancy, antepartum condition or complication  Plan for RLTCS   Ancef 2g    39 weeks gestation of pregnancy           Boby Mac, DO  Obstetrics  [facility] -  Labor and Delivery

## 2025-05-14 NOTE — LACTATION NOTE
This note was copied from a baby's chart.  [facility]  Lactation Note - Well Baby    Infant's Name: Maninder Cisse    MRN:  [id number]  : 25  Birth Weight: 3530/7.13  DC Weight:   DC Bili :      Ped:       Peds apt date/time:   Bili F/U Date:       Mother's Name: Stephanie Cisse    MRN: [id number]  Phone:  Age: 24    G 2 P 2          OB: Ayse       Method of Delivery: c/s  Previous BF Experience: Bf last child, exclusively pumped            Mother's Breastfeeding Goals: plans to exclusively pump    Date: 25  Notes:  Mom states infant latched well, but mom wishes to exclusively pump and bottle feed. Mom given 21 mm breast pump flange    Education:  Frequency and duration  Cleaning pump

## 2025-05-14 NOTE — SUBJECTIVE & OBJECTIVE
Obstetric HPI:  Patient reports None contractions, active fetal movement, No vaginal bleeding , No loss of fluid       OB History    Para Term  AB Living   2 2 2 0 0 2   SAB IAB Ectopic Multiple Live Births   0 0 0 0 2      # Outcome Date GA Lbr Misael/2nd Weight Sex Type Anes PTL Lv   2 Term 25 39w1d  3.53 kg (7 lb 12.5 oz) M CS-LTranv Spinal N SUSAN      Name: Tierra Bullock      Apgar1: 8  Apgar5: 9   1 Term 22 39w0d  3.881 kg (8 lb 8.9 oz) M CS-LTranv Spinal  SUSAN      Name: TIERRA BULLOCK      Apgar1: 9  Apgar5: 9     Past Medical History:   Diagnosis Date    Acne     Hypoglycemia      Past Surgical History:   Procedure Laterality Date     SECTION N/A 2022    Procedure:  SECTION;  Surgeon: Boby Tavera DO;  Location: [facility] L&D;  Service: OB/GYN;  Laterality: N/A;       PTA Medications   Medication Sig    atomoxetine (STRATTERA) 40 MG capsule Take 40 mg by mouth every morning. (Patient not taking: Reported on 2025)    cephALEXin (KEFLEX) 500 MG capsule Take 500 mg by mouth every 6 (six) hours. (Patient not taking: Reported on 2025)    gabapentin (NEURONTIN) 300 MG capsule Take 1 capsule (300 mg total) by mouth 2 (two) times daily. (Patient not taking: Reported on 2025)    NIFEdipine (PROCARDIA) 10 MG Cap Take 1 capsule (10 mg total) by mouth every 6 (six) hours as needed. (Patient not taking: Reported on 2025)       Review of patient's allergies indicates:   Allergen Reactions    Acetaminophen     Oxycodone hcl     Percocet [oxycodone-acetaminophen] Itching        Family History       Problem Relation (Age of Onset)    Clotting disorder Mother, Sister    Heart disease Mother    No Known Problems Father, Brother, Sister, Brother, Sister    Protein S deficiency Mother    Stroke Mother          Tobacco Use    Smoking status: Never    Smokeless tobacco: Never   Substance and Sexual Activity    Alcohol use: Never    Drug use: Not Currently     Sexual activity: Yes     Partners: Male     Birth control/protection: None     Review of Systems   All other systems reviewed and are negative.     Objective:     Vital Signs (Most Recent):  Temp: 97.5 °F (36.4 °C) (05/14/25 0908)  Pulse: 62 (05/14/25 1246)  Resp: (!) 22 (05/14/25 0904)  BP: (!) 113/57 (05/14/25 1222)  SpO2: 97 % (05/14/25 1246) Vital Signs (24h Range):  Temp:  [97 °F (36.1 °C)-97.6 °F (36.4 °C)] 97.5 °F (36.4 °C)  Pulse:  [] 62  Resp:  [18-22] 22  SpO2:  [78 %-100 %] 97 %  BP: ()/(54-78) 113/57     Weight: 83.9 kg (185 lb)  Body mass index is 28.98 kg/m².    FHT: 135 Cat 1 (reassuring)  TOCO:  Quiet     Physical Exam:   Constitutional: She is oriented to person, place, and time. She appears well-developed and well-nourished.    HENT:   Head: Normocephalic and atraumatic.      Cardiovascular:  Normal rate.             Pulmonary/Chest: Effort normal.        Abdominal: Soft. There is no abdominal tenderness.     Genitourinary:    Uterus normal.             Musculoskeletal: Normal range of motion.       Neurological: She is alert and oriented to person, place, and time.    Skin: Skin is warm and dry.    Psychiatric: She has a normal mood and affect. Her behavior is normal. Judgment and thought content normal.        Cervix: deferred     Significant Labs:  Lab Results   Component Value Date    GROUPTRH O POS 05/14/2025    HEPBSAG Non-Reactive 10/07/2024       CBC:   Recent Labs   Lab 05/14/25  0541   WBC 14.45*   RBC 4.20   HGB 12.2   HCT 37.2*      MCV 88.6   MCH 29.0   MCHC 32.8     I have personallly reviewed all pertinent lab results from the last 24 hours.  Recent Lab Results  (Last 5 results in the past 24 hours)        05/14/25  0816   05/14/25  0810   05/14/25  0559   05/14/25  0546   05/14/25  0541        POC A-aDO2               Albumin/Globulin Ratio         0.7       Albumin         2.7       ALP         160       ALT         11       Anion Gap         16        Appearance, UA       Clear         AST         29       Baso #         0.03       Basophil %         0.2       Bilirubin (UA)       Negative         BILIRUBIN TOTAL         0.5       BUN         7       BUN/CREAT RATIO         11       Calcium         8.5       Chloride         107       CO2         17       Color, UA       Light Yellow         Creatinine         0.63       Differential Method         Auto       eGFR         127  Comment: Estimated GFR calculated using the CKD-EPI creatinine (2021) equation.       Eos #         0.10       Eos %         0.7       Globulin, Total         3.9       Glucose         80       Glucose, UA       Normal         Group & Rh         O POS       Hematocrit         37.2       Hemoglobin         12.2       HIV 1/2 Ag/Ab         Non-Reactive       Immature Grans (Abs)         0.15       Immature Granulocytes         1.0       INDIRECT GIOVANY         NEG       Ketones, UA       Negative         Leukocyte Esterase, UA       Negative         Lymph #         3.19       Lymph %         22.1       MCH         29.0       MCHC         32.8       MCV         88.6       Mono #         1.21       Mono %         8.4       MPV         11.5       Neutrophils, Abs         9.77       Neutrophils Relative         67.6       NITRITE UA       Negative         nRBC         0.0       NUCLEATED RBC ABSOLUTE         0.00       Blood, UA       Negative         pH, UA       6.5         Platelet Count         214       POC Base Excess -1.1   -1.8             POC Glucose     110           POC HCO3 24.3   24.7             POC PCO2 42   48             POC PH 7.37   7.32             POC PO2 29   31             POC SATURATED O2 60.0   66.4             Potassium         3.8       PROTEIN TOTAL         6.6       Protein, UA       Negative         RBC         4.20       RDW         13.2       Sodium         136       Spec Grav UA       1.018         Specimen Outdate         05/17/2025 23:59       Syphilis Ab  Interpretation         Non-Reactive  Comment: 0.0 - 0.9: Non-Reactive  0.91 - 1.10: Equivocal with RPR to follow  >1.10:  Reactive with RPR to Follow       UROBILINOGEN UA       Normal         WBC         14.45

## 2025-05-14 NOTE — TRANSFER OF CARE
"Anesthesia Transfer of Care Note    Patient: Stephanie Cisse    Procedure(s) Performed: Procedure(s) (LRB):   SECTION (N/A)    Patient location: Labor and Delivery    Anesthesia Type: spinal    Transport from OR: Transported from OR on room air with adequate spontaneous ventilation    Post pain: adequate analgesia    Post assessment: no apparent anesthetic complications    Post vital signs: stable    Level of consciousness: awake, alert and oriented    Nausea/Vomiting: no nausea/vomiting    Complications: none    Transfer of care protocol was followed      Last vitals: Visit Vitals  /78 (BP Location: Left arm, Patient Position: Lying)   Pulse 72   Temp 36.1 °C (97 °F) (Oral)   Resp (!) 22   Ht 5' 7" (1.702 m)   Wt 83.9 kg (185 lb)   LMP 2024   SpO2 99%   Breastfeeding No   BMI 28.98 kg/m²     "

## 2025-05-14 NOTE — L&D DELIVERY NOTE
[facility] -  Labor and Delivery   Section   Operative Note    SUMMARY     Date of Procedure: 2025     Procedure: Procedure(s) (LRB):   SECTION (N/A)    Surgeons and Role:     * Boby Tavera DO - Primary    Assisting Surgeon: None    Pre-Operative Diagnosis: History of  delivery, currently pregnant [O34.219]    Post-Operative Diagnosis: Post-Op Diagnosis Codes:     * History of  delivery, currently pregnant [O34.219]    Anesthesia: Spinal    Technical Procedures Used: RLTCS           Description of the Findings of the Procedure: mild fascial adhesive disease, viable male  in cephalic presentation    Significant Surgical Tasks Conducted by the Assistant(s), if Applicable: NA    Complications: No    Blood Loss: 719 mL     Patient was taken to OR with IVF running. Spinal anesthesia was placed without difficulty.  With patient in supine position, the legs are  and Reynoso Catheter placed and positioning to supine done.   Abdomen prepped with Chloroprep and 3 minute drying time allowed prior to draping of the abdomen.   Time out taken with OR team members.    Pfannenstiel Incision made through the skin, transverse fascial incision developed, rectus muscles  in the midline and the peritoneum entered.   no adhesions noted.    Collin retractor was inserted into the abdominal cavity.  The lower uterine segment and position of the fetus identified.   Bladder flap taken down through transverse peritoneal incision.    Low Transverse Incision made through well developed lower uterine segment and extended laterally with blunt dissection.   Clear fluid noted.  Infant delivered from vertex presentation.  Cord clamped after one minute and  handed to attending nurse.  Cord blood taken, placenta delivered.  The uterus was cleared of all clot and debris.  The edges of the uterine incision are grasped with ring clamps at the angles and the inferior  "midline edge of the incision.    Closure with running lock 0 monocryl, starting at left angle, tying at right angle.  A second imbricating layer was placed with 0 monocryl.   Observation for bleeding with suture of any bleeding along the hysterotomy line.   With good hemostasis noted, the anterior pelvis is rinsed with sterile saline.     Collin retractor was removed from the abdomen.   Closure of the abdomen with 3-0 monocryl running of the peritoneum.  Rectus muscle was reapproximated with 0 chromic.  Fascia was closed with 0 looped PDS starting at the left angle and tying the knot at the right angle.  Subcutaneous tissue was reapproximated with 2-0 plain gut.  Skin closure with Insorb staples.  Wound dressed with steri strips, pressure dressing.            Specimens:   Specimen (24h ago, onward)      None            Condition: Good    VTE Risk Mitigation (From admission, onward)           Ordered     IP VTE LOW RISK PATIENT  Once         25     Place sequential compression device  Until discontinued         25                    Disposition: PACU - hemodynamically stable.    Attestation: Good         Delivery Information for Roscoe Cisse    Birth information:  YOB: 2025   Time of birth: 8:18 AM   Sex: male   Head Delivery Date/Time:     Delivery type:    Gestational Age: 39w1d       Delivery Providers    Delivering clinician:            Measurements    Weight: 3530 g  Weight (lbs): 7 lb 12.5 oz  Length: 50.8 cm  Length (in): 20"         Apgars    Living status: Living  Apgar Component Scores:  1 min.:  5 min.:  10 min.:  15 min.:  20 min.:    Skin color:  0  1       Heart rate:  2  2       Reflex irritability:  2  2       Muscle tone:  2  2       Respiratory effort:  2  2       Total:  8  9       Apgars assigned by: BEBETO NNSAV                         Interventions/Resuscitation           Cord    No data filed       Placenta    Placenta delivery date/time:   Placenta " removal:            Labor Events:       labor:       Labor Onset Date/Time:         Dilation Complete Date/Time:         Start Pushing Date/Time:       Rupture Date/Time:            Rupture type:          Fluid Amount:       Fluid Color:        steroids:       Antibiotics given for GBS:       Induction:       Indications for induction:        Augmentation:       Indications for augmentation:       Labor complications:       Additional complications:          Cervical ripening:                     Delivery:      Episiotomy:       Indication for Episiotomy:       Perineal Lacerations:   Repaired:      Periurethral Laceration:   Repaired:     Labial Laceration:   Repaired:     Sulcus Laceration:   Repaired:     Vaginal Laceration:   Repaired:     Cervical Laceration:   Repaired:     Repair suture:       Repair # of packets:       Last Value - EBL - Nursing (mL):       Sum - EBL - Nursing (mL): 0     Last Value - EBL - Anesthesia (mL):      Calculated QBL (mL):       Running total QBL (mL):       Vaginal Sweep Performed:       Surgicount Correct:         Other providers:            Details (if applicable):  Trial of Labor      Categorization:      Priority:     Indications for :     Incision Type:       Additional  information:  Forceps:    Vacuum:    Breech:    Observed anomalies    Other (Comments):

## 2025-05-14 NOTE — ANESTHESIA POSTPROCEDURE EVALUATION
Anesthesia Post Evaluation    Patient: Stephanie Cisse    Procedure(s) Performed: Procedure(s) (LRB):   SECTION (N/A)    Final Anesthesia Type: spinal      Patient location during evaluation: labor & delivery  Patient participation: Yes- Able to Participate  Level of consciousness: awake and alert  Post-procedure vital signs: reviewed and stable  Pain management: adequate  Airway patency: patent    PONV status at discharge: No PONV  Anesthetic complications: no      Cardiovascular status: blood pressure returned to baseline  Respiratory status: unassisted  Hydration status: euvolemic  Follow-up not needed.              Vitals Value Taken Time   /55 25 14:22   Temp 36.7 °C (98.1 °F) 25 11:06   Pulse 62 25 14:31   Resp 22 25 09:04   SpO2 98 % 25 14:31   Vitals shown include unfiled device data.      Event Time   Out of Recovery 11:08:00         Pain/Deann Score: No data recorded

## 2025-05-15 LAB
BASOPHILS # BLD AUTO: 0.03 K/UL (ref 0–0.2)
BASOPHILS NFR BLD AUTO: 0.2 % (ref 0–1)
DIFFERENTIAL METHOD BLD: ABNORMAL
EOSINOPHIL # BLD AUTO: 0.07 K/UL (ref 0–0.5)
EOSINOPHIL NFR BLD AUTO: 0.5 % (ref 1–4)
ERYTHROCYTE [DISTWIDTH] IN BLOOD BY AUTOMATED COUNT: 12.9 % (ref 11.5–14.5)
HCT VFR BLD AUTO: 32.4 % (ref 38–47)
HGB BLD-MCNC: 10.7 G/DL (ref 12–16)
IMM GRANULOCYTES # BLD AUTO: 0.09 K/UL (ref 0–0.04)
IMM GRANULOCYTES NFR BLD: 0.7 % (ref 0–0.4)
LYMPHOCYTES # BLD AUTO: 2.07 K/UL (ref 1–4.8)
LYMPHOCYTES NFR BLD AUTO: 15.9 % (ref 27–41)
MCH RBC QN AUTO: 29.7 PG (ref 27–31)
MCHC RBC AUTO-ENTMCNC: 33 G/DL (ref 32–36)
MCV RBC AUTO: 90 FL (ref 80–96)
MONOCYTES # BLD AUTO: 1.25 K/UL (ref 0–0.8)
MONOCYTES NFR BLD AUTO: 9.6 % (ref 2–6)
MPC BLD CALC-MCNC: 11.5 FL (ref 9.4–12.4)
NEUTROPHILS # BLD AUTO: 9.5 K/UL (ref 1.8–7.7)
NEUTROPHILS NFR BLD AUTO: 73.1 % (ref 53–65)
NRBC # BLD AUTO: 0 X10E3/UL
NRBC, AUTO (.00): 0 %
PLATELET # BLD AUTO: 168 K/UL (ref 150–400)
RBC # BLD AUTO: 3.6 M/UL (ref 4.2–5.4)
WBC # BLD AUTO: 13.01 K/UL (ref 4.5–11)

## 2025-05-15 PROCEDURE — 85025 COMPLETE CBC W/AUTO DIFF WBC: CPT | Performed by: STUDENT IN AN ORGANIZED HEALTH CARE EDUCATION/TRAINING PROGRAM

## 2025-05-15 PROCEDURE — 11000001 HC ACUTE MED/SURG PRIVATE ROOM

## 2025-05-15 PROCEDURE — 25000003 PHARM REV CODE 250: Performed by: STUDENT IN AN ORGANIZED HEALTH CARE EDUCATION/TRAINING PROGRAM

## 2025-05-15 PROCEDURE — 0503F POSTPARTUM CARE VISIT: CPT | Mod: ,,, | Performed by: STUDENT IN AN ORGANIZED HEALTH CARE EDUCATION/TRAINING PROGRAM

## 2025-05-15 PROCEDURE — 63600175 PHARM REV CODE 636 W HCPCS: Mod: JZ,TB | Performed by: STUDENT IN AN ORGANIZED HEALTH CARE EDUCATION/TRAINING PROGRAM

## 2025-05-15 PROCEDURE — 36415 COLL VENOUS BLD VENIPUNCTURE: CPT | Performed by: STUDENT IN AN ORGANIZED HEALTH CARE EDUCATION/TRAINING PROGRAM

## 2025-05-15 RX ORDER — ACETAMINOPHEN 325 MG/1
650 TABLET ORAL EVERY 4 HOURS PRN
Status: DISCONTINUED | OUTPATIENT
Start: 2025-05-15 | End: 2025-05-16 | Stop reason: HOSPADM

## 2025-05-15 RX ORDER — IBUPROFEN 800 MG/1
800 TABLET, FILM COATED ORAL EVERY 6 HOURS PRN
Qty: 90 TABLET | Refills: 0 | Status: SHIPPED | OUTPATIENT
Start: 2025-05-15

## 2025-05-15 RX ADMIN — Medication 1 TABLET: at 09:05

## 2025-05-15 RX ADMIN — IBUPROFEN 800 MG: 800 TABLET, FILM COATED ORAL at 01:05

## 2025-05-15 RX ADMIN — IBUPROFEN 800 MG: 800 TABLET, FILM COATED ORAL at 09:05

## 2025-05-15 RX ADMIN — ACETAMINOPHEN 650 MG: 325 TABLET ORAL at 09:05

## 2025-05-15 RX ADMIN — ACETAMINOPHEN 650 MG: 325 TABLET ORAL at 02:05

## 2025-05-15 RX ADMIN — DOCUSATE SODIUM 200 MG: 100 CAPSULE, LIQUID FILLED ORAL at 09:05

## 2025-05-15 RX ADMIN — KETOROLAC TROMETHAMINE 30 MG: 30 INJECTION, SOLUTION INTRAMUSCULAR at 05:05

## 2025-05-15 RX ADMIN — ACETAMINOPHEN 650 MG: 325 TABLET ORAL at 07:05

## 2025-05-15 RX ADMIN — ACETAMINOPHEN 650 MG: 325 TABLET ORAL at 05:05

## 2025-05-15 RX ADMIN — ACETAMINOPHEN 650 MG: 325 TABLET ORAL at 11:05

## 2025-05-15 NOTE — DISCHARGE SUMMARY
[facility] -  Labor and Delivery  Obstetrics  Discharge Summary      Patient Name: Stephanie Cisse  MRN: [id number]  Admission Date: 2025  Hospital Length of Stay: 1 days  Discharge Date and Time: No discharge date for patient encounter.  Attending Physician: Boby aTvera DO   Discharging Provider: Boby Mac DO   Primary Care Provider: Keely, Primary Doctor    HPI: 24 y.o.  at 39w1d presents to L&D for scheduled RLTCS. She denies vaginal bleeding, LOF, ctx. Reports good fetal movement. Prenatal care with Dr. Tavera. Pregnancy complicated by POTS. O pos, GBS pos, rubella imm.            Procedure(s) (LRB):   SECTION (N/A)     Hospital Course:   24 y.o.  at 39w1d presented to L&D for scheduled RLTCS. She ultimately delivered via RLTCS. See delivery note for details.    Postpartum course was uncomplicated. Hgb stable. She met all postpartum milestones and was discharged on PPD1. Follow up outpatient with Dr. Tavera.             Final Active Diagnoses:    Diagnosis Date Noted POA    PRINCIPAL PROBLEM:  Previous  section complicating pregnancy, antepartum condition or complication [O34.219] 2025 Yes    39 weeks gestation of pregnancy [Z3A.39] 2025 Not Applicable    Postpartum care following  delivery [Z39.2] 2022 Not Applicable      Problems Resolved During this Admission:        Significant Diagnostic Studies: Labs: CBC   Recent Labs   Lab 25  0541 05/15/25  0519   WBC 14.45* 13.01*   HGB 12.2 10.7*   HCT 37.2* 32.4*    168    and All labs within the past 24 hours have been reviewed      Feeding Method: breast    Immunizations       Date Immunization Status Dose Route/Site Given by    25 1025 MMR Incomplete 0.5 mL Subcutaneous/     25 1025 Tdap Incomplete 0.5 mL Intramuscular/             Delivery:    Episiotomy:     Lacerations:     Repair suture:     Repair # of packets:     Blood loss (ml):       Birth  "information:  YOB: 2025   Time of birth: 8:18 AM   Sex: male   Delivery type: , Low Transverse   Gestational Age: 39w1d     Measurements    Weight: 3530 g  Weight (lbs): 7 lb 12.5 oz  Length: 50.8 cm  Length (in): 20"         Delivery Clinician: Delivery Providers    Delivering clinician: Boby Tavera DO          Additional  information:  Forceps:    Vacuum:    Breech:    Observed anomalies      Living?:     Apgars    Living status: Living  Apgar Component Scores:  1 min.:  5 min.:  10 min.:  15 min.:  20 min.:    Skin color:  0  1       Heart rate:  2  2       Reflex irritability:  2  2       Muscle tone:  2  2       Respiratory effort:  2  2       Total:  8  9       Apgars assigned by: BEBETO HUNT         Placenta: Delivered:       appearance  Pending Diagnostic Studies:       Procedure Component Value Units Date/Time    EXTRA TUBES [[id number]] Collected: 25 0541    Order Status: Sent Lab Status: In process Updated: 25    Specimen: Blood, Venous     Narrative:      The following orders were created for panel order EXTRA TUBES.  Procedure                               Abnormality         Status                     ---------                               -----------         ------                     Gold Top Hold[[id number]]                                   In process                   Please view results for these tests on the individual orders.            Discharged Condition: good    Disposition: Home or Self Care    Follow Up:   Follow-up Information       Boby Tavera DO Follow up on 2025.    Specialty: Obstetrics and Gynecology  Why: For wound re-check, postpartum  Contact information:  [address] [place] [id number]  [phone]                           Patient Instructions:      Ice to affected area     Lifting restrictions     No driving until:     Pelvic Rest     Notify your health care provider if you experience any of the " following:  temperature >100.4     Notify your health care provider if you experience any of the following:  persistent nausea and vomiting or diarrhea     Notify your health care provider if you experience any of the following:  severe uncontrolled pain     Notify your health care provider if you experience any of the following:  redness, tenderness, or signs of infection (pain, swelling, redness, odor or green/yellow discharge around incision site)     Notify your health care provider if you experience any of the following:  difficulty breathing or increased cough     Notify your health care provider if you experience any of the following:  severe persistent headache     Notify your health care provider if you experience any of the following:  worsening rash     Notify your health care provider if you experience any of the following:  persistent dizziness, light-headedness, or visual disturbances     Notify your health care provider if you experience any of the following:  increased confusion or weakness     Activity as tolerated     Weight bearing restrictions (specify):     Medications:  Current Discharge Medication List        START taking these medications    Details   ibuprofen (ADVIL,MOTRIN) 800 MG tablet Take 1 tablet (800 mg total) by mouth every 6 (six) hours as needed for Pain.  Qty: 90 tablet, Refills: 0           CONTINUE these medications which have NOT CHANGED    Details   atomoxetine (STRATTERA) 40 MG capsule Take 40 mg by mouth every morning.      gabapentin (NEURONTIN) 300 MG capsule Take 1 capsule (300 mg total) by mouth 2 (two) times daily.  Qty: 180 capsule, Refills: 3           STOP taking these medications       cephALEXin (KEFLEX) 500 MG capsule Comments:   Reason for Stopping:         NIFEdipine (PROCARDIA) 10 MG Cap Comments:   Reason for Stopping:               Boby Mac, DO  Obstetrics  [facility] -  Labor and Delivery

## 2025-05-15 NOTE — LACTATION NOTE
This note was copied from a baby's chart.  [facility]  Lactation Note - Well Baby    Infant's Name: Baby Roscoe Cisse    MRN:  [id number]  : 25  Birth Weight: 3530/7.13  DC Weight:   DC Bili :      Ped:       Peds apt date/time:   Bili F/U Date:       Mother's Name: Stephanie Cisse    MRN: [id number]  Phone:  Age: 24    G 2 P 2          OB: Ayse       Method of Delivery: c/s  Previous BF Experience: Bf last child, exclusively pumped            Mother's Breastfeeding Goals: plans to exclusively pump    25  Mom states infant latched well, but mom wishes to exclusively pump and bottle feed. Mom given 21 mm breast pump flange    5/15/25   Mom states pumping/latching is going well, reports getting approx 4 ml when pumping, mom to call with any needs    Education:  Frequency and duration  Cleaning pump

## 2025-05-15 NOTE — HOSPITAL COURSE
24 y.o.  at 39w1d presented to L&D for scheduled RLTCS. She ultimately delivered via RLTCS. See delivery note for details.    Postpartum course was uncomplicated. Hgb stable. She met all postpartum milestones and was discharged on PPD1. Follow up outpatient with Dr. Tavera.

## 2025-05-15 NOTE — SUBJECTIVE & OBJECTIVE
Interval History: POD1 s/p RLTCS    She is doing well this morning. She is tolerating a regular diet without nausea or vomiting. She is voiding spontaneously. She is ambulating. She has passed flatus, and has not a BM. Vaginal bleeding is mild. She denies fever or chills. Abdominal pain is mild and controlled with oral medications. She Is breastfeeding. She desires circumcision for her male baby: yes.    Objective:     Vital Signs (Most Recent):  Temp: 97.9 °F (36.6 °C) (05/15/25 0800)  Pulse: 75 (05/15/25 0800)  Resp: 18 (05/15/25 0800)  BP: (!) 113/59 (05/15/25 0800)  SpO2: 99 % (05/14/25 1914) Vital Signs (24h Range):  Temp:  [97.8 °F (36.6 °C)-98.1 °F (36.7 °C)] 97.9 °F (36.6 °C)  Pulse:  [58-87] 75  Resp:  [18-20] 18  SpO2:  [96 %-100 %] 99 %  BP: ()/(53-66) 113/59     Weight: 83.9 kg (185 lb)  Body mass index is 28.98 kg/m².      Intake/Output Summary (Last 24 hours) at 5/15/2025 1304  Last data filed at 5/14/2025 1821  Gross per 24 hour   Intake 325.66 ml   Output 900 ml   Net -574.34 ml         Significant Labs:  Lab Results   Component Value Date    GROUPTRH O POS 05/14/2025    HEPBSAG Non-Reactive 10/07/2024     Recent Labs   Lab 05/15/25  0519   HGB 10.7*   HCT 32.4*       CBC:   Recent Labs   Lab 05/15/25  0519   WBC 13.01*   RBC 3.60*   HGB 10.7*   HCT 32.4*      MCV 90.0   MCH 29.7   MCHC 33.0     I have personallly reviewed all pertinent lab results from the last 24 hours.  Recent Lab Results         05/15/25  0519        Baso # 0.03       Basophil % 0.2       Differential Method Auto       Eos # 0.07       Eos % 0.5       Hematocrit 32.4       Hemoglobin 10.7       Immature Grans (Abs) 0.09       Immature Granulocytes 0.7       Lymph # 2.07       Lymph % 15.9       MCH 29.7       MCHC 33.0       MCV 90.0       Mono # 1.25       Mono % 9.6       MPV 11.5       Neutrophils, Abs 9.50       Neutrophils Relative 73.1       nRBC 0.0       NUCLEATED RBC ABSOLUTE 0.00       Platelet Count 168        RBC 3.60       RDW 12.9       WBC 13.01               Physical Exam:   Constitutional: She is oriented to person, place, and time. She appears well-developed and well-nourished.    HENT:   Head: Normocephalic and atraumatic.    Eyes: Pupils are equal, round, and reactive to light. EOM are normal.     Cardiovascular:  Normal rate.      Exam reveals no clubbing, no cyanosis and no edema.        Pulmonary/Chest: Effort normal.        Abdominal: Soft. She exhibits abdominal incision. She exhibits no distension. There is abdominal tenderness.     Genitourinary:    Uterus normal.             Musculoskeletal: Normal range of motion and moves all extremeties.       Neurological: She is alert and oriented to person, place, and time.    Skin: Skin is warm and dry. No cyanosis. Nails show no clubbing.    Psychiatric: She has a normal mood and affect. Her behavior is normal. Judgment and thought content normal.       Review of Systems   All other systems reviewed and are negative.

## 2025-05-15 NOTE — PROGRESS NOTES
[facility] -  Labor and Delivery  Obstetrics  Postpartum Progress Note    Patient Name: Stephanie Cisse  MRN: [id number]  Admission Date: 2025  Hospital Length of Stay: 1 days  Attending Physician: Boby Tavera DO  Primary Care Provider: No, Primary Doctor    Subjective:     Principal Problem:Previous  section complicating pregnancy, antepartum condition or complication    Hospital Course:  No notes on file    Interval History: POD1 s/p RLTCS    She is doing well this morning. She is tolerating a regular diet without nausea or vomiting. She is voiding spontaneously. She is ambulating. She has passed flatus, and has not a BM. Vaginal bleeding is mild. She denies fever or chills. Abdominal pain is mild and controlled with oral medications. She Is breastfeeding. She desires circumcision for her male baby: yes.    Objective:     Vital Signs (Most Recent):  Temp: 97.9 °F (36.6 °C) (05/15/25 0800)  Pulse: 75 (05/15/25 0800)  Resp: 18 (05/15/25 0800)  BP: (!) 113/59 (05/15/25 0800)  SpO2: 99 % (25 1914) Vital Signs (24h Range):  Temp:  [97.8 °F (36.6 °C)-98.1 °F (36.7 °C)] 97.9 °F (36.6 °C)  Pulse:  [58-87] 75  Resp:  [18-20] 18  SpO2:  [96 %-100 %] 99 %  BP: ()/(53-66) 113/59     Weight: 83.9 kg (185 lb)  Body mass index is 28.98 kg/m².      Intake/Output Summary (Last 24 hours) at 5/15/2025 1304  Last data filed at 2025 1821  Gross per 24 hour   Intake 325.66 ml   Output 900 ml   Net -574.34 ml         Significant Labs:  Lab Results   Component Value Date    GROUPTRH O POS 2025    HEPBSAG Non-Reactive 10/07/2024     Recent Labs   Lab 05/15/25  0519   HGB 10.7*   HCT 32.4*       CBC:   Recent Labs   Lab 05/15/25  0519   WBC 13.01*   RBC 3.60*   HGB 10.7*   HCT 32.4*      MCV 90.0   MCH 29.7   MCHC 33.0     I have personallly reviewed all pertinent lab results from the last 24 hours.  Recent Lab Results         05/15/25  0519        Baso # 0.03       Basophil %  0.2       Differential Method Auto       Eos # 0.07       Eos % 0.5       Hematocrit 32.4       Hemoglobin 10.7       Immature Grans (Abs) 0.09       Immature Granulocytes 0.7       Lymph # 2.07       Lymph % 15.9       MCH 29.7       MCHC 33.0       MCV 90.0       Mono # 1.25       Mono % 9.6       MPV 11.5       Neutrophils, Abs 9.50       Neutrophils Relative 73.1       nRBC 0.0       NUCLEATED RBC ABSOLUTE 0.00       Platelet Count 168       RBC 3.60       RDW 12.9       WBC 13.01               Physical Exam:   Constitutional: She is oriented to person, place, and time. She appears well-developed and well-nourished.    HENT:   Head: Normocephalic and atraumatic.    Eyes: Pupils are equal, round, and reactive to light. EOM are normal.     Cardiovascular:  Normal rate.      Exam reveals no clubbing, no cyanosis and no edema.        Pulmonary/Chest: Effort normal.        Abdominal: Soft. She exhibits abdominal incision. She exhibits no distension. There is abdominal tenderness.     Genitourinary:    Uterus normal.             Musculoskeletal: Normal range of motion and moves all extremeties.       Neurological: She is alert and oriented to person, place, and time.    Skin: Skin is warm and dry. No cyanosis. Nails show no clubbing.    Psychiatric: She has a normal mood and affect. Her behavior is normal. Judgment and thought content normal.       Review of Systems   All other systems reviewed and are negative.    Assessment/Plan:     24 y.o. female  for:    * Previous  section complicating pregnancy, antepartum condition or complication  Plan for RLTCS   Ancef 2g    39 weeks gestation of pregnancy       Postpartum care following  delivery  Meeting all milestones  Hgb stable        Disposition: As patient meets milestones, will plan to discharge POD1. Requests discharge at 36 hours postpartum. Baby cleared for discharge.    Boby Mac, DO  Obstetrics  [facility] -  Labor  and Delivery

## 2025-05-16 VITALS
HEART RATE: 80 BPM | RESPIRATION RATE: 20 BRPM | OXYGEN SATURATION: 97 % | DIASTOLIC BLOOD PRESSURE: 59 MMHG | TEMPERATURE: 99 F | SYSTOLIC BLOOD PRESSURE: 107 MMHG | BODY MASS INDEX: 29.03 KG/M2 | HEIGHT: 67 IN | WEIGHT: 185 LBS

## 2025-05-16 PROCEDURE — 25000003 PHARM REV CODE 250: Performed by: STUDENT IN AN ORGANIZED HEALTH CARE EDUCATION/TRAINING PROGRAM

## 2025-05-16 RX ADMIN — ACETAMINOPHEN 650 MG: 325 TABLET ORAL at 03:05

## 2025-05-16 RX ADMIN — DOCUSATE SODIUM 200 MG: 100 CAPSULE, LIQUID FILLED ORAL at 09:05

## 2025-05-16 RX ADMIN — IBUPROFEN 800 MG: 800 TABLET, FILM COATED ORAL at 06:05

## 2025-05-16 RX ADMIN — ACETAMINOPHEN 650 MG: 325 TABLET ORAL at 09:05

## 2025-05-16 RX ADMIN — Medication 1 TABLET: at 09:05

## 2025-05-16 NOTE — DISCHARGE INSTRUCTIONS
PP Education Booklet Given ---- pe 05/16/25 at 10:53 Discharge book given. All discharge instructions were given on 05/16/25 at 10;53,verbalized understanding with PP booklet given   Skin to skin 18 pe     Rooming in 29 pe     Importance of Exclusive Breastfeeding for 6 mo 35 pe     Bleeding 6 pe     Incision Care 10 pe     Sex 11 pe     Pain Management 8 pe     Perineal Care 9 pe     Minimizing Engorgement 40 pe     Collection & Storage of BM 42-43 pe     S/S of BF Problems  pe     Hand Expression 42 pe     Maternal s/s breast problems 41 pe     Mgnt of Common BF Problems (engorgement, sore & cracked nipples) 40-41 pe     PPD/Anxiety 14-15 pe

## 2025-05-19 ENCOUNTER — PATIENT MESSAGE (OUTPATIENT)
Dept: OBSTETRICS AND GYNECOLOGY | Facility: HOSPITAL | Age: 25
End: 2025-05-19
Payer: COMMERCIAL

## 2025-05-22 ENCOUNTER — POSTPARTUM VISIT (OUTPATIENT)
Dept: OBSTETRICS AND GYNECOLOGY | Facility: CLINIC | Age: 25
End: 2025-05-22
Payer: COMMERCIAL

## 2025-05-22 VITALS
DIASTOLIC BLOOD PRESSURE: 57 MMHG | HEART RATE: 61 BPM | WEIGHT: 169 LBS | BODY MASS INDEX: 26.47 KG/M2 | SYSTOLIC BLOOD PRESSURE: 104 MMHG

## 2025-05-22 DIAGNOSIS — Z48.89 ENCOUNTER FOR POST SURGICAL WOUND CHECK: Primary | ICD-10-CM

## 2025-05-22 PROCEDURE — 99024 POSTOP FOLLOW-UP VISIT: CPT | Mod: ,,, | Performed by: STUDENT IN AN ORGANIZED HEALTH CARE EDUCATION/TRAINING PROGRAM

## 2025-05-22 PROCEDURE — 99999 PR PBB SHADOW E&M-EST. PATIENT-LVL III: CPT | Mod: PBBFAC,,, | Performed by: STUDENT IN AN ORGANIZED HEALTH CARE EDUCATION/TRAINING PROGRAM

## 2025-05-22 PROCEDURE — 99213 OFFICE O/P EST LOW 20 MIN: CPT | Mod: PBBFAC | Performed by: STUDENT IN AN ORGANIZED HEALTH CARE EDUCATION/TRAINING PROGRAM

## 2025-05-30 ENCOUNTER — HOSPITAL ENCOUNTER (EMERGENCY)
Facility: HOSPITAL | Age: 25
Discharge: HOME | End: 2025-05-30
Attending: OBSTETRICS & GYNECOLOGY
Payer: COMMERCIAL

## 2025-05-30 VITALS
HEIGHT: 67 IN | HEART RATE: 110 BPM | RESPIRATION RATE: 20 BRPM | SYSTOLIC BLOOD PRESSURE: 106 MMHG | TEMPERATURE: 99 F | DIASTOLIC BLOOD PRESSURE: 64 MMHG | BODY MASS INDEX: 26.08 KG/M2 | WEIGHT: 166.13 LBS

## 2025-05-30 DIAGNOSIS — L05.91 INFECTED PILONIDAL CYST: Primary | ICD-10-CM

## 2025-05-30 PROCEDURE — 87070 CULTURE OTHR SPECIMN AEROBIC: CPT | Performed by: OBSTETRICS & GYNECOLOGY

## 2025-05-30 PROCEDURE — 99282 EMERGENCY DEPT VISIT SF MDM: CPT

## 2025-05-30 PROCEDURE — 87075 CULTR BACTERIA EXCEPT BLOOD: CPT | Performed by: OBSTETRICS & GYNECOLOGY

## 2025-05-31 NOTE — PROGRESS NOTES
Postop Note    Subjective:       Stephanie Cisse is a 24 y.o. female who presents to the clinic 1 week s/p  delivery. Eating a regular diet without difficulty. Voiding without difficulty. Bowel movements are normal. The patient is not having any pain.  Infant is doing well, breast feeding.  Denies depression or anxiety.     The following portions of the patient's history were reviewed and updated as appropriate: allergies, current medications, past family history, past medical history, past social history, past surgical history, and problem list.    Review of Systems  Pertinent items are noted in HPI.      Objective:      BP (!) 104/57   Pulse 61   Wt 76.7 kg (169 lb)   LMP 2024   Breastfeeding Yes   BMI 26.47 kg/m²   General:  alert, appears stated age, and cooperative   Abdomen: soft, non-tender   Incision:   healing well, no drainage, no erythema, no hernia, no seroma, no swelling, no dehiscence, incision well approximated           Assessment:     Postop  Delivery   Doing well postoperatively.  Contraception Discussion     Plan:      1. Continue any current medications.  2. Wound care discussed.  3. Activity restrictions reviewed.  4. Anticipated return to work: 6 weeks.  5. Follow up: 2 weeks.

## 2025-06-01 LAB — MICROORGANISM SPEC CULT: ABNORMAL

## 2025-06-02 ENCOUNTER — OFFICE VISIT (OUTPATIENT)
Dept: SURGERY | Facility: CLINIC | Age: 25
End: 2025-06-02
Attending: SURGERY
Payer: COMMERCIAL

## 2025-06-02 DIAGNOSIS — L05.91 PILONIDAL CYST: Primary | ICD-10-CM

## 2025-06-02 PROCEDURE — 99212 OFFICE O/P EST SF 10 MIN: CPT | Mod: PBBFAC | Performed by: SURGERY

## 2025-06-02 PROCEDURE — 99203 OFFICE O/P NEW LOW 30 MIN: CPT | Mod: S$PBB,,, | Performed by: SURGERY

## 2025-06-02 PROCEDURE — 1111F DSCHRG MED/CURRENT MED MERGE: CPT | Mod: CPTII,,, | Performed by: SURGERY

## 2025-06-02 PROCEDURE — 1159F MED LIST DOCD IN RCRD: CPT | Mod: CPTII,,, | Performed by: SURGERY

## 2025-06-02 PROCEDURE — 99999 PR PBB SHADOW E&M-EST. PATIENT-LVL II: CPT | Mod: PBBFAC,,, | Performed by: SURGERY

## 2025-06-03 LAB
BACTERIA SPEC ANAEROBE CULT: ABNORMAL
BACTERIA SPEC ANAEROBE CULT: ABNORMAL

## 2025-06-04 ENCOUNTER — PATIENT MESSAGE (OUTPATIENT)
Dept: OBSTETRICS AND GYNECOLOGY | Facility: CLINIC | Age: 25
End: 2025-06-04
Payer: COMMERCIAL

## 2025-06-05 PROBLEM — L05.91 PILONIDAL CYST: Status: ACTIVE | Noted: 2025-06-05

## 2025-06-19 ENCOUNTER — POSTPARTUM VISIT (OUTPATIENT)
Dept: OBSTETRICS AND GYNECOLOGY | Facility: CLINIC | Age: 25
End: 2025-06-19
Payer: COMMERCIAL

## 2025-06-19 VITALS
SYSTOLIC BLOOD PRESSURE: 105 MMHG | DIASTOLIC BLOOD PRESSURE: 59 MMHG | HEART RATE: 74 BPM | BODY MASS INDEX: 25.69 KG/M2 | WEIGHT: 164 LBS

## 2025-06-19 PROCEDURE — 99213 OFFICE O/P EST LOW 20 MIN: CPT | Mod: PBBFAC | Performed by: STUDENT IN AN ORGANIZED HEALTH CARE EDUCATION/TRAINING PROGRAM

## 2025-06-19 PROCEDURE — 99999 PR PBB SHADOW E&M-EST. PATIENT-LVL III: CPT | Mod: PBBFAC,,, | Performed by: STUDENT IN AN ORGANIZED HEALTH CARE EDUCATION/TRAINING PROGRAM

## 2025-06-19 PROCEDURE — 0503F POSTPARTUM CARE VISIT: CPT | Mod: CPTII,,, | Performed by: STUDENT IN AN ORGANIZED HEALTH CARE EDUCATION/TRAINING PROGRAM

## 2025-06-19 NOTE — PROGRESS NOTES
Postop Note    Subjective:       Stephanie Cisse is a 24 y.o. female who presents to the clinic 6 weeks s/p  delivery. Eating a regular diet without difficulty. Voiding without difficulty. Bowel movements are normal. The patient is not having any pain.  Infant is doing well, breast feeding.  Denies depression or anxiety.     The following portions of the patient's history were reviewed and updated as appropriate: allergies, current medications, past family history, past medical history, past social history, past surgical history, and problem list.    Review of Systems  Pertinent items are noted in HPI.      Objective:      BP (!) 105/59   Pulse 74   Wt 74.4 kg (164 lb)   LMP 2024   Breastfeeding Yes   BMI 25.69 kg/m²   General:  alert, appears stated age, and cooperative   Abdomen: soft, non-tender   Incision:   healing well, no drainage, no erythema, no hernia, no seroma, no swelling, no dehiscence, incision well approximated           Assessment:     Postop  Delivery   Doing well postoperatively.  Contraception Discussion     Plan:      1. Continue any current medications.  2. Wound care discussed.  3. Activity restrictions: none  4. Anticipated return to work: now.  5. Contraception: plans to use condoms  6. Follow up: 6 months.

## (undated) DEVICE — SUT 0 36IN COATED VICRYL UN

## (undated) DEVICE — CLAMP CORD UMBILICAL STL

## (undated) DEVICE — SOL IRRIGATION SALINE 0.9% 1000ML BOTTLE

## (undated) DEVICE — SUT 2/0 27IN PLAIN GUT CT

## (undated) DEVICE — CANISTER SUCTION 3000CC

## (undated) DEVICE — GLOVE 7.0 PROTEXIS PI BLUE

## (undated) DEVICE — SUT 1 36IN CHROMIC GUT CTX

## (undated) DEVICE — APPLICATOR CHLORAPREP ORN 26ML

## (undated) DEVICE — PACK C SECTION RUSH

## (undated) DEVICE — SUT 2/0 36IN COATED VICRYL

## (undated) DEVICE — Device

## (undated) DEVICE — SUT CHROMIC 0 CT-1

## (undated) DEVICE — SUT MONOCRYL 3-0 SH U/D

## (undated) DEVICE — GLOVE 6.5 PROTEXIS PI MICRO

## (undated) DEVICE — SUT COAT VICRYL 0 CTX 27IN

## (undated) DEVICE — SUT 0 VICRYL / CT-1

## (undated) DEVICE — PAD GROUND ELECTROD DISP VALLEY

## (undated) DEVICE — SUT MONOCRYL 3-0 SH 27 UND"

## (undated) DEVICE — STAPLER SKIN SUBCUTICULAR

## (undated) DEVICE — CANISTER MEDI-VAC SUC 1200CC

## (undated) DEVICE — SYR BULB 60CC IRRIGATION

## (undated) DEVICE — CLAMP CORD STERILE

## (undated) DEVICE — SUT 3-0 VICRYL / SH (J416)

## (undated) DEVICE — PAD GROUNDING DISPOABLE

## (undated) DEVICE — SUT 0 60IN PDS II VIO MONO

## (undated) DEVICE — CANISTER SUCTION MEDIVAC RIGID 1200CC W/FIL

## (undated) DEVICE — SOL NACL IRR 1000ML BTL

## (undated) DEVICE — SPONGE LAP STRL 18X18 5/PK

## (undated) DEVICE — SUT J218H VICRYL 4-0

## (undated) DEVICE — DRAPE L&D NON STERILE (ORDER 63957)

## (undated) DEVICE — SUTURE MONOCRYL 1 CT-1

## (undated) DEVICE — HEMOSTAT SURGICEL 4X8IN

## (undated) DEVICE — SUTURE MONOCRYL 3-0 STRAIGHT 27 ABSORB Y523H"

## (undated) DEVICE — PACK C-SECTION CUSTOM